# Patient Record
Sex: MALE | Race: WHITE | Employment: UNEMPLOYED | URBAN - METROPOLITAN AREA
[De-identification: names, ages, dates, MRNs, and addresses within clinical notes are randomized per-mention and may not be internally consistent; named-entity substitution may affect disease eponyms.]

---

## 2017-01-18 DIAGNOSIS — F41.1 GENERALIZED ANXIETY DISORDER: ICD-10-CM

## 2017-01-18 DIAGNOSIS — F98.8 ADD (ATTENTION DEFICIT DISORDER): ICD-10-CM

## 2017-01-20 RX ORDER — DEXTROAMPHETAMINE SACCHARATE, AMPHETAMINE ASPARTATE, DEXTROAMPHETAMINE SULFATE AND AMPHETAMINE SULFATE 5; 5; 5; 5 MG/1; MG/1; MG/1; MG/1
20 TABLET ORAL 2 TIMES DAILY
Qty: 60 TABLET | Refills: 0 | Status: SHIPPED | OUTPATIENT
Start: 2017-01-20 | End: 2017-08-09 | Stop reason: ALTCHOICE

## 2017-01-20 RX ORDER — ALPRAZOLAM 1 MG/1
1 TABLET ORAL 2 TIMES DAILY PRN
Qty: 60 TABLET | Refills: 0 | Status: SHIPPED | OUTPATIENT
Start: 2017-01-20 | End: 2017-08-09 | Stop reason: ALTCHOICE

## 2017-01-20 RX ORDER — ALBUTEROL SULFATE 90 UG/1
2 AEROSOL, METERED RESPIRATORY (INHALATION) EVERY 6 HOURS PRN
Qty: 3 INHALER | Refills: 1 | Status: SHIPPED | OUTPATIENT
Start: 2017-01-20 | End: 2017-10-09 | Stop reason: SDUPTHER

## 2017-01-23 ENCOUNTER — OFFICE VISIT (OUTPATIENT)
Dept: PSYCHIATRY | Age: 22
End: 2017-01-23

## 2017-01-23 VITALS
DIASTOLIC BLOOD PRESSURE: 60 MMHG | OXYGEN SATURATION: 95 % | WEIGHT: 129 LBS | SYSTOLIC BLOOD PRESSURE: 100 MMHG | HEART RATE: 117 BPM | BODY MASS INDEX: 19.11 KG/M2 | HEIGHT: 69 IN

## 2017-01-23 DIAGNOSIS — F41.9 ANXIETY: Primary | ICD-10-CM

## 2017-01-23 PROCEDURE — 99204 OFFICE O/P NEW MOD 45 MIN: CPT | Performed by: PSYCHIATRY & NEUROLOGY

## 2017-01-23 ASSESSMENT — ENCOUNTER SYMPTOMS
RESPIRATORY NEGATIVE: 1
GASTROINTESTINAL NEGATIVE: 1
EYES NEGATIVE: 1

## 2017-02-20 DIAGNOSIS — F98.8 ADD (ATTENTION DEFICIT DISORDER): ICD-10-CM

## 2017-02-20 DIAGNOSIS — F41.1 GENERALIZED ANXIETY DISORDER: ICD-10-CM

## 2017-02-20 RX ORDER — DEXTROAMPHETAMINE SACCHARATE, AMPHETAMINE ASPARTATE, DEXTROAMPHETAMINE SULFATE AND AMPHETAMINE SULFATE 5; 5; 5; 5 MG/1; MG/1; MG/1; MG/1
20 TABLET ORAL 2 TIMES DAILY
Qty: 60 TABLET | Refills: 0 | OUTPATIENT
Start: 2017-02-20

## 2017-02-20 RX ORDER — ALPRAZOLAM 1 MG/1
1 TABLET ORAL 2 TIMES DAILY PRN
Qty: 60 TABLET | Refills: 0 | OUTPATIENT
Start: 2017-02-20

## 2017-02-21 ENCOUNTER — OFFICE VISIT (OUTPATIENT)
Dept: FAMILY MEDICINE CLINIC | Age: 22
End: 2017-02-21

## 2017-02-21 VITALS
DIASTOLIC BLOOD PRESSURE: 72 MMHG | OXYGEN SATURATION: 98 % | BODY MASS INDEX: 20.71 KG/M2 | TEMPERATURE: 97.4 F | SYSTOLIC BLOOD PRESSURE: 118 MMHG | WEIGHT: 139.8 LBS | HEART RATE: 119 BPM | HEIGHT: 69 IN

## 2017-02-21 DIAGNOSIS — F41.1 GENERALIZED ANXIETY DISORDER: Primary | ICD-10-CM

## 2017-02-21 DIAGNOSIS — F98.8 ADD (ATTENTION DEFICIT DISORDER): ICD-10-CM

## 2017-02-21 DIAGNOSIS — F13.20 BENZODIAZEPINE DEPENDENCE (HCC): ICD-10-CM

## 2017-02-21 PROCEDURE — 99214 OFFICE O/P EST MOD 30 MIN: CPT | Performed by: FAMILY MEDICINE

## 2017-02-21 RX ORDER — HYDROXYZINE HYDROCHLORIDE 25 MG/1
25 TABLET, FILM COATED ORAL EVERY 4 HOURS PRN
Qty: 60 TABLET | Refills: 1 | Status: SHIPPED | OUTPATIENT
Start: 2017-02-21 | End: 2017-08-09 | Stop reason: ALTCHOICE

## 2017-02-22 ASSESSMENT — ENCOUNTER SYMPTOMS
RESPIRATORY NEGATIVE: 1
EYES NEGATIVE: 1
GASTROINTESTINAL NEGATIVE: 1

## 2017-02-26 LAB
6-ACETYLMORPHINE: NOT DETECTED
7-AMINOCLONAZEPAM: NOT DETECTED
ALPHA-OH-ALPRAZOLAM: NOT DETECTED
ALPRAZOLAM: NOT DETECTED
AMPHETAMINE: NOT DETECTED
BARBITURATES: NOT DETECTED
BENZOYLECGONINE: NOT DETECTED
BUPRENORPHINE: NOT DETECTED
CARISOPRODOL: NOT DETECTED
CLONAZEPAM: NOT DETECTED
CODEINE: NOT DETECTED
CREATININE URINE: 31.6 MG/DL (ref 20–400)
DIAZEPAM: NOT DETECTED
DRUGS EXPECTED: NORMAL
EER PAIN MGT DRUG PANEL, HIGH RES/EMIT U: NORMAL
ETHYL GLUCURONIDE: NOT DETECTED
FENTANYL: NOT DETECTED
HYDROCODONE: NOT DETECTED
HYDROMORPHONE: NOT DETECTED
LORAZEPAM: NOT DETECTED
MARIJUANA METABOLITE: PRESENT
MDA: NOT DETECTED
MDEA: NOT DETECTED
MDMA URINE: NOT DETECTED
MEPERIDINE: NOT DETECTED
METHADONE: NOT DETECTED
METHAMPHETAMINE: NOT DETECTED
METHYLPHENIDATE: NOT DETECTED
MIDAZOLAM: NOT DETECTED
MORPHINE: NOT DETECTED
NORBUPRENORPHINE, FREE: NOT DETECTED
NORDIAZEPAM: NOT DETECTED
NORFENTANYL: NOT DETECTED
NORHYDROCODONE, URINE: NOT DETECTED
NOROXYCODONE: NOT DETECTED
NOROXYMORPHONE, URINE: NOT DETECTED
OXAZEPAM: NOT DETECTED
OXYCODONE: NOT DETECTED
OXYMORPHONE: NOT DETECTED
PAIN MANAGEMENT DRUG PANEL: NORMAL
PAIN MANAGEMENT DRUG PANEL: NORMAL
PCP: NOT DETECTED
PHENTERMINE: NOT DETECTED
PROPOXYPHENE: NOT DETECTED
TAPENTADOL, URINE: NOT DETECTED
TAPENTADOL-O-SULFATE, URINE: NOT DETECTED
TEMAZEPAM: NOT DETECTED
TRAMADOL: NOT DETECTED
ZOLPIDEM: NOT DETECTED

## 2017-03-20 ENCOUNTER — OFFICE VISIT (OUTPATIENT)
Dept: FAMILY MEDICINE CLINIC | Age: 22
End: 2017-03-20

## 2017-03-20 VITALS
WEIGHT: 144 LBS | TEMPERATURE: 98.2 F | BODY MASS INDEX: 21.27 KG/M2 | SYSTOLIC BLOOD PRESSURE: 120 MMHG | DIASTOLIC BLOOD PRESSURE: 84 MMHG

## 2017-03-20 DIAGNOSIS — F13.20 BENZODIAZEPINE DEPENDENCE (HCC): ICD-10-CM

## 2017-03-20 DIAGNOSIS — F41.1 GENERALIZED ANXIETY DISORDER: ICD-10-CM

## 2017-03-20 DIAGNOSIS — F98.8 ADD (ATTENTION DEFICIT DISORDER): Primary | ICD-10-CM

## 2017-03-20 PROCEDURE — 99213 OFFICE O/P EST LOW 20 MIN: CPT | Performed by: FAMILY MEDICINE

## 2017-03-20 RX ORDER — CLONIDINE HYDROCHLORIDE 0.1 MG/1
0.1 TABLET ORAL NIGHTLY
Qty: 30 TABLET | Refills: 0 | Status: SHIPPED | OUTPATIENT
Start: 2017-03-20 | End: 2017-04-15 | Stop reason: SDUPTHER

## 2017-03-26 ASSESSMENT — ENCOUNTER SYMPTOMS
RESPIRATORY NEGATIVE: 1
GASTROINTESTINAL NEGATIVE: 1
EYES NEGATIVE: 1

## 2017-04-15 DIAGNOSIS — F98.8 ADD (ATTENTION DEFICIT DISORDER): ICD-10-CM

## 2017-04-15 DIAGNOSIS — F41.1 GENERALIZED ANXIETY DISORDER: ICD-10-CM

## 2017-04-15 RX ORDER — CLONIDINE HYDROCHLORIDE 0.1 MG/1
0.1 TABLET ORAL NIGHTLY
Qty: 30 TABLET | Refills: 0 | Status: SHIPPED | OUTPATIENT
Start: 2017-04-15 | End: 2017-08-09 | Stop reason: ALTCHOICE

## 2017-06-04 ENCOUNTER — TELEPHONE (OUTPATIENT)
Dept: FAMILY MEDICINE CLINIC | Age: 22
End: 2017-06-04

## 2017-08-09 ENCOUNTER — OFFICE VISIT (OUTPATIENT)
Dept: FAMILY MEDICINE CLINIC | Age: 22
End: 2017-08-09

## 2017-08-09 VITALS
WEIGHT: 140.8 LBS | SYSTOLIC BLOOD PRESSURE: 102 MMHG | HEIGHT: 69 IN | BODY MASS INDEX: 20.86 KG/M2 | OXYGEN SATURATION: 100 % | TEMPERATURE: 98.6 F | HEART RATE: 123 BPM | DIASTOLIC BLOOD PRESSURE: 78 MMHG

## 2017-08-09 DIAGNOSIS — R30.0 DYSURIA: Primary | ICD-10-CM

## 2017-08-09 DIAGNOSIS — R30.0 DYSURIA: ICD-10-CM

## 2017-08-09 LAB
ALBUMIN SERPL-MCNC: 4.9 G/DL (ref 3.4–5)
ANION GAP SERPL CALCULATED.3IONS-SCNC: 16 MMOL/L (ref 3–16)
BASOPHILS ABSOLUTE: 0.1 K/UL (ref 0–0.2)
BASOPHILS RELATIVE PERCENT: 1 %
BILIRUBIN, POC: NORMAL
BLOOD URINE, POC: NORMAL
BUN BLDV-MCNC: 11 MG/DL (ref 7–20)
CALCIUM SERPL-MCNC: 10 MG/DL (ref 8.3–10.6)
CHLORIDE BLD-SCNC: 99 MMOL/L (ref 99–110)
CLARITY, POC: CLEAR
CO2: 26 MMOL/L (ref 21–32)
COLOR, POC: YELLOW
CREAT SERPL-MCNC: 0.8 MG/DL (ref 0.9–1.3)
EOSINOPHILS ABSOLUTE: 0.6 K/UL (ref 0–0.6)
EOSINOPHILS RELATIVE PERCENT: 12 %
GFR AFRICAN AMERICAN: >60
GFR NON-AFRICAN AMERICAN: >60
GLUCOSE BLD-MCNC: 86 MG/DL (ref 70–99)
GLUCOSE URINE, POC: NORMAL
HCT VFR BLD CALC: 45 % (ref 40.5–52.5)
HEMOGLOBIN: 15.7 G/DL (ref 13.5–17.5)
KETONES, POC: NORMAL
LEUKOCYTE EST, POC: NORMAL
LYMPHOCYTES ABSOLUTE: 2.1 K/UL (ref 1–5.1)
LYMPHOCYTES RELATIVE PERCENT: 38.8 %
MCH RBC QN AUTO: 30.6 PG (ref 26–34)
MCHC RBC AUTO-ENTMCNC: 34.9 G/DL (ref 31–36)
MCV RBC AUTO: 87.5 FL (ref 80–100)
MONOCYTES ABSOLUTE: 0.5 K/UL (ref 0–1.3)
MONOCYTES RELATIVE PERCENT: 9.6 %
NEUTROPHILS ABSOLUTE: 2.1 K/UL (ref 1.7–7.7)
NEUTROPHILS RELATIVE PERCENT: 38.6 %
NITRITE, POC: NORMAL
PDW BLD-RTO: 12.8 % (ref 12.4–15.4)
PH, POC: 8.5
PHOSPHORUS: 3.5 MG/DL (ref 2.5–4.9)
PLATELET # BLD: 221 K/UL (ref 135–450)
PMV BLD AUTO: 9 FL (ref 5–10.5)
POTASSIUM SERPL-SCNC: 4.3 MMOL/L (ref 3.5–5.1)
PROTEIN, POC: NORMAL
RBC # BLD: 5.14 M/UL (ref 4.2–5.9)
SODIUM BLD-SCNC: 141 MMOL/L (ref 136–145)
SPECIFIC GRAVITY, POC: 1
UROBILINOGEN, POC: 0.2
WBC # BLD: 5.3 K/UL (ref 4–11)

## 2017-08-09 PROCEDURE — 99213 OFFICE O/P EST LOW 20 MIN: CPT | Performed by: NURSE PRACTITIONER

## 2017-08-09 PROCEDURE — 81002 URINALYSIS NONAUTO W/O SCOPE: CPT | Performed by: NURSE PRACTITIONER

## 2017-08-09 RX ORDER — PHENAZOPYRIDINE HYDROCHLORIDE 100 MG/1
100 TABLET, FILM COATED ORAL 3 TIMES DAILY PRN
Qty: 21 TABLET | Refills: 0 | Status: SHIPPED | OUTPATIENT
Start: 2017-08-09 | End: 2017-08-23 | Stop reason: ALTCHOICE

## 2017-08-09 ASSESSMENT — ENCOUNTER SYMPTOMS
GASTROINTESTINAL NEGATIVE: 1
RESPIRATORY NEGATIVE: 1
ALLERGIC/IMMUNOLOGIC NEGATIVE: 1
NAUSEA: 0
VOMITING: 0
EYES NEGATIVE: 1

## 2017-08-11 ENCOUNTER — OFFICE VISIT (OUTPATIENT)
Dept: FAMILY MEDICINE CLINIC | Age: 22
End: 2017-08-11

## 2017-08-11 VITALS
TEMPERATURE: 98.6 F | HEART RATE: 110 BPM | WEIGHT: 140.6 LBS | SYSTOLIC BLOOD PRESSURE: 100 MMHG | OXYGEN SATURATION: 98 % | DIASTOLIC BLOOD PRESSURE: 62 MMHG | BODY MASS INDEX: 20.83 KG/M2 | HEIGHT: 69 IN

## 2017-08-11 DIAGNOSIS — R30.0 DYSURIA: ICD-10-CM

## 2017-08-11 DIAGNOSIS — J45.20 MILD INTERMITTENT ASTHMA WITHOUT COMPLICATION: Primary | ICD-10-CM

## 2017-08-11 LAB — URINE CULTURE, ROUTINE: NORMAL

## 2017-08-11 PROCEDURE — 99213 OFFICE O/P EST LOW 20 MIN: CPT | Performed by: NURSE PRACTITIONER

## 2017-08-11 ASSESSMENT — ENCOUNTER SYMPTOMS
DIFFICULTY BREATHING: 0
EYES NEGATIVE: 1
WHEEZING: 0
FREQUENT THROAT CLEARING: 0
TROUBLE SWALLOWING: 0
HOARSE VOICE: 0
HEARTBURN: 0
RHINORRHEA: 0
COUGH: 1
ALLERGIC/IMMUNOLOGIC NEGATIVE: 1
HEMOPTYSIS: 0
SPUTUM PRODUCTION: 0
NAUSEA: 0
SORE THROAT: 0
VOMITING: 0
GASTROINTESTINAL NEGATIVE: 1
SHORTNESS OF BREATH: 0
CHEST TIGHTNESS: 0

## 2017-08-23 ENCOUNTER — OFFICE VISIT (OUTPATIENT)
Dept: FAMILY MEDICINE CLINIC | Age: 22
End: 2017-08-23

## 2017-08-23 VITALS
WEIGHT: 138.4 LBS | SYSTOLIC BLOOD PRESSURE: 104 MMHG | TEMPERATURE: 98.6 F | DIASTOLIC BLOOD PRESSURE: 68 MMHG | BODY MASS INDEX: 20.44 KG/M2

## 2017-08-23 DIAGNOSIS — N34.2 URETHRITIS: Primary | ICD-10-CM

## 2017-08-23 LAB
BILIRUBIN, POC: NORMAL
BLOOD URINE, POC: NORMAL
CLARITY, POC: CLEAR
COLOR, POC: YELLOW
GLUCOSE URINE, POC: NORMAL
KETONES, POC: NORMAL
LEUKOCYTE EST, POC: NORMAL
NITRITE, POC: NORMAL
PH, POC: 7
PROTEIN, POC: NORMAL
SPECIFIC GRAVITY, POC: 1.01
UROBILINOGEN, POC: 0.2

## 2017-08-23 PROCEDURE — 99213 OFFICE O/P EST LOW 20 MIN: CPT | Performed by: FAMILY MEDICINE

## 2017-08-23 PROCEDURE — 81002 URINALYSIS NONAUTO W/O SCOPE: CPT | Performed by: FAMILY MEDICINE

## 2017-08-23 ASSESSMENT — ENCOUNTER SYMPTOMS
GASTROINTESTINAL NEGATIVE: 1
RESPIRATORY NEGATIVE: 1
EYES NEGATIVE: 1

## 2017-08-24 LAB
C. TRACHOMATIS DNA ,URINE: NEGATIVE
N. GONORRHOEAE DNA, URINE: NEGATIVE

## 2017-10-06 ENCOUNTER — TELEPHONE (OUTPATIENT)
Dept: FAMILY MEDICINE CLINIC | Age: 22
End: 2017-10-06

## 2017-10-06 NOTE — TELEPHONE ENCOUNTER
Hi   I'm messaging you for my son Satya Rose. He was in the ER 9/15 after being on an antibiotic for 3 weeks given to him by a urologist. His   Stomach was in severe distress. He takes Nexium and tums everyday. He was given sucralfate 1 gram to take 4 times a day. It helps a little but he is not well. Can you refill this? Also I called the [facility]. He has an   Appointment in October 17. Do you know anyone we. Oils get into see sooner? He's losing weight and his stomach burns all the time. Our appointment on the 17th is with Magnus Garcia. Do you know him? Although his bloodwork comes out ok there is something distressing his stomach. He knows I'm emailing. You can email him back or me. Whatever is fine. Thank you!!  RADHA

## 2017-10-09 ENCOUNTER — OFFICE VISIT (OUTPATIENT)
Dept: FAMILY MEDICINE CLINIC | Age: 22
End: 2017-10-09

## 2017-10-09 VITALS
TEMPERATURE: 97.5 F | DIASTOLIC BLOOD PRESSURE: 82 MMHG | SYSTOLIC BLOOD PRESSURE: 116 MMHG | WEIGHT: 127.6 LBS | HEIGHT: 68 IN | BODY MASS INDEX: 19.34 KG/M2

## 2017-10-09 DIAGNOSIS — G89.29 CHRONIC GENERALIZED ABDOMINAL PAIN: Primary | ICD-10-CM

## 2017-10-09 DIAGNOSIS — K21.9 GASTROESOPHAGEAL REFLUX DISEASE, ESOPHAGITIS PRESENCE NOT SPECIFIED: ICD-10-CM

## 2017-10-09 DIAGNOSIS — J45.30 REACTIVE AIRWAY DISEASE, MILD PERSISTENT, UNCOMPLICATED: ICD-10-CM

## 2017-10-09 DIAGNOSIS — R10.84 CHRONIC GENERALIZED ABDOMINAL PAIN: Primary | ICD-10-CM

## 2017-10-09 LAB
BILIRUBIN, POC: ABNORMAL
BLOOD URINE, POC: ABNORMAL
CLARITY, POC: ABNORMAL
COLOR, POC: YELLOW
GLUCOSE URINE, POC: ABNORMAL
KETONES, POC: 80
LEUKOCYTE EST, POC: ABNORMAL
NITRITE, POC: ABNORMAL
PH, POC: 6.5
PROTEIN, POC: 100
SPECIFIC GRAVITY, POC: 1.01
UROBILINOGEN, POC: 0.2

## 2017-10-09 PROCEDURE — 99213 OFFICE O/P EST LOW 20 MIN: CPT | Performed by: FAMILY MEDICINE

## 2017-10-09 PROCEDURE — 81002 URINALYSIS NONAUTO W/O SCOPE: CPT | Performed by: FAMILY MEDICINE

## 2017-10-09 RX ORDER — ALBUTEROL SULFATE 90 UG/1
2 AEROSOL, METERED RESPIRATORY (INHALATION) EVERY 6 HOURS PRN
Qty: 3 INHALER | Refills: 1 | Status: SHIPPED | OUTPATIENT
Start: 2017-10-09 | End: 2018-03-16 | Stop reason: SDUPTHER

## 2017-10-09 RX ORDER — DEXLANSOPRAZOLE 60 MG/1
60 CAPSULE, DELAYED RELEASE ORAL DAILY
Qty: 30 CAPSULE | Refills: 3 | Status: SHIPPED | OUTPATIENT
Start: 2017-10-09 | End: 2021-11-30 | Stop reason: ALTCHOICE

## 2017-10-11 ENCOUNTER — PATIENT MESSAGE (OUTPATIENT)
Dept: FAMILY MEDICINE CLINIC | Age: 22
End: 2017-10-11

## 2017-10-11 LAB — TISSUE TRANSGLUTAMINASE IGA: 1 U/ML (ref 0–3)

## 2017-10-11 ASSESSMENT — ENCOUNTER SYMPTOMS
EYES NEGATIVE: 1
RESPIRATORY NEGATIVE: 1
GASTROINTESTINAL NEGATIVE: 1

## 2017-10-11 NOTE — PROGRESS NOTES
10/11/17    Philip Early  1995      Chief Complaint   Patient presents with    Abdominal Pain     burning, abd pain, constant, nausea, started a month ago   Abdominal Pain: Patient complains of abdominal pain. The pain is described as aching, burning and cramping, and is several/10 in intensity. Pain is located in the epigastric, periumbilical bilateral without radiation. Onset was several weeks ago. Symptoms have been unchanged since. Aggravating factors: eating. Alleviating factors: acetaminophen, activity, bowel movements and proton pump inhibitors. Associated symptoms: anorexia, nausea and sweats. The patient denies constipation and diarrhea. Needs a refill on the albuterol inhlaer.  He is using it nightly    Vitals:    10/09/17 1520   BP: 116/82   Temp: 97.5 °F (36.4 °C)         Immunization History   Administered Date(s) Administered    Meningococcal MCV4P (Menactra) 05/03/2010    Tdap (Boostrix, Adacel) 08/03/2010    Varicella (Varivax) 03/11/2003, 05/03/2010       Allergies   Allergen Reactions    Amoxicillin Hives    Ceclor [Cefaclor]      Joints swelled     Gluten Meal Nausea And Vomiting     fatigue     Outpatient Prescriptions Marked as Taking for the 10/9/17 encounter (Office Visit) with Marek Cosby MD   Medication Sig Dispense Refill    dexlansoprazole (DEXILANT) 60 MG CPDR delayed release capsule Take 1 capsule by mouth daily 30 capsule 3    albuterol sulfate HFA (PROAIR HFA) 108 (90 Base) MCG/ACT inhaler Inhale 2 puffs into the lungs every 6 hours as needed for Wheezing or Shortness of Breath 3 Inhaler 1    sucralfate (CARAFATE) 1 GM tablet Take 1 tablet by mouth 4 times daily 120 tablet 3    Chlorphen-Phenyleph-APAP (TYLENOL ALLERGY MULTI-SYMPTOM) 2-5-325 MG TABS Take 1 tablet by mouth 4 times daily as needed (congestion) 80 tablet 1    esomeprazole Magnesium (NEXIUM) 20 MG PACK Take 20 mg by mouth daily         Past Medical History:   Diagnosis Date    ADD

## 2017-11-13 NOTE — TELEPHONE ENCOUNTER
Pt is currently being treated on dexilant and needs a PA request.   Sent over PA by [facility] for pt.   Bao Koroma ([facility]) [address]

## 2017-11-15 ENCOUNTER — HOSPITAL ENCOUNTER (OUTPATIENT)
Dept: NUCLEAR MEDICINE | Age: 22
Discharge: HOME | End: 2017-11-15
Attending: INTERNAL MEDICINE | Admitting: INTERNAL MEDICINE

## 2017-11-15 VITALS — WEIGHT: 130 LBS | BODY MASS INDEX: 19.7 KG/M2 | HEIGHT: 68 IN

## 2017-11-15 DIAGNOSIS — R10.9 ABDOMINAL PAIN, UNSPECIFIED ABDOMINAL LOCATION: ICD-10-CM

## 2017-11-15 DIAGNOSIS — R10.9 ABDOMINAL PAIN: ICD-10-CM

## 2017-11-15 RX ADMIN — Medication 6 MILLICURIE: at 08:11

## 2017-11-28 ENCOUNTER — TELEPHONE (OUTPATIENT)
Dept: FAMILY MEDICINE CLINIC | Age: 22
End: 2017-11-28

## 2017-11-28 NOTE — TELEPHONE ENCOUNTER
Please call him and ask him how often he is using the rescue inhaler. If it is more than four times a week he needs to be seen ot address this as that is too much.

## 2017-12-01 ENCOUNTER — OFFICE VISIT (OUTPATIENT)
Dept: SURGERY | Age: 22
End: 2017-12-01

## 2017-12-01 VITALS
SYSTOLIC BLOOD PRESSURE: 112 MMHG | OXYGEN SATURATION: 96 % | TEMPERATURE: 97.1 F | HEART RATE: 79 BPM | DIASTOLIC BLOOD PRESSURE: 71 MMHG | BODY MASS INDEX: 20.46 KG/M2 | WEIGHT: 135 LBS | HEIGHT: 68 IN

## 2017-12-01 DIAGNOSIS — R10.10 INTERMITTENT UPPER ABDOMINAL PAIN: ICD-10-CM

## 2017-12-01 DIAGNOSIS — K82.8 BILIARY DYSKINESIA: Primary | ICD-10-CM

## 2017-12-01 PROCEDURE — G8484 FLU IMMUNIZE NO ADMIN: HCPCS | Performed by: SURGERY

## 2017-12-01 PROCEDURE — G8420 CALC BMI NORM PARAMETERS: HCPCS | Performed by: SURGERY

## 2017-12-01 PROCEDURE — G8427 DOCREV CUR MEDS BY ELIG CLIN: HCPCS | Performed by: SURGERY

## 2017-12-01 PROCEDURE — 99244 OFF/OP CNSLTJ NEW/EST MOD 40: CPT | Performed by: SURGERY

## 2017-12-01 NOTE — LETTER
[facility]) Surgical Oncology and General Surgery   [address]    p [phone] f [phone] MD Yolanda    SURGERY ORDER --   2017    3:30pm     Facility:   [address] [place] [address]. # _________________                                  Scheduled by: ____________    Surgery Date & Time:    2017    11:00am                                                             Pt arrival:   9:00am     Patient Name:  Linda Hidalgo           :  1995          PCP:  Diego Quigley MD       Home Ph:    [phone] (home) [phone] (work)                                                    PROCEDURE: Laparoscopic cholecystectomy with intraoperative cholangiogram    DIAGNOSIS:      ICD-10-CM ICD-9-CM    1. Biliary dyskinesia K82.8 575.8    2.  Intermittent upper abdominal pain R10.10 789.09      Anesthesia: _GA_ Time Needed: _90 mins_Pt Position: _supine_         Outpatient _Y_ Admit __  Assist._____  Pre-Op H & P to be done by: _PCP__      Labs Needed: CBC__PT/PTT__INR__CMP__EKG__CXR__Urine Hcg ___    Additional / Special Orders:   Ciprofloxacin 400 mg IV constantin Cervantes MD  12/3/2017 9:14 PM

## 2017-12-04 NOTE — PROGRESS NOTES
[facility]) Surgical Oncology / General Surgery    [facility] 1995    HPI: Dear Dr. Aurea Guevara, Thank you for referring . Lindsay Forde for management of gallbladder problems. Patient is having intermittent upper abdominal pain for a few years. Patient tried gluten free diet initially. He had endoscopy done which showed some erythema of stomach. Upon further investigation HIDA scan showed decreased ejection fraction of gallbladder. Patient is unable to say consistent aggravating or relieving factors. He does states that episodes of pain is getting more frequent. Pain does comes sometimes after eating heavy meals. New medication started by Dr. Kemar Hills does seems to help some. No history of jaundice, pancreatitis or any other liver issues. Past Medical History:   Diagnosis Date    ADD (attention deficit disorder) 11/17/2014    Allergic rhinitis 9/4/2013    Allergy     Asthma 9/4/2013    Gluten-induced enteropathy 12/17/2015       History reviewed. No pertinent surgical history.     Allergies: Amoxicillin; Ceclor [cefaclor]; and Gluten meal     Social History:   Social History   Substance Use Topics    Smoking status: Current Every Day Smoker     Packs/day: 0.25     Years: 6.00     Types: E-Cigarettes    Smokeless tobacco: Current User      Comment: patient smokes e-cigarettes started 3 weeks ago 7/28/2017    Alcohol use 1.2 oz/week     2 Shots of liquor per week        Current Outpatient Prescriptions   Medication Sig Dispense Refill    dexlansoprazole (DEXILANT) 60 MG CPDR delayed release capsule Take 1 capsule by mouth daily 30 capsule 3    albuterol sulfate HFA (PROAIR HFA) 108 (90 Base) MCG/ACT inhaler Inhale 2 puffs into the lungs every 6 hours as needed for Wheezing or Shortness of Breath 3 Inhaler 1    sucralfate (CARAFATE) 1 GM tablet Take 1 tablet by mouth 4 times daily 120 tablet 3    Chlorphen-Phenyleph-APAP (TYLENOL ALLERGY MULTI-SYMPTOM) 2-5-325 MG TABS Take 1 tablet by mouth 4 times daily as needed (congestion) 80 tablet 1    esomeprazole Magnesium (NEXIUM) 20 MG PACK Take 20 mg by mouth daily       No current facility-administered medications for this visit. Review of Systems:  Review of all other systems was negative except as noted in HPI. /71   Pulse 79   Temp 97.1 °F (36.2 °C) (Oral)   Ht 5' 8\" (1.727 m)   Wt 135 lb (61.2 kg)   SpO2 96%   BMI 20.53 kg/m²         Physical Exam:   Constitutional: He is oriented to person, place, and time. He appears well-developed and well-nourished. No distress. HENT: Moist mucus membranes  Head: Normocephalic and atraumatic. Eyes: EOM are normal. Pupils are equal, round, and no icterus. Neck: Normal range of motion. Neck supple. No thyromegaly present. Cardiovascular: Normal rate and regular rhythm by peripheral pulses. Pulmonary/Chest: No respiratory distress. Abdominal: Soft. He exhibits no distension and no mass. There is no hepatosplenomegaly. No tenderness. Has no CVA tenderness. Extremities: He exhibits no edema. Lymphadenopathy: He has no right or left cervical adenopathy. Neurological: Grossly intact. Skin: Skin is warm and dry. Psychiatric:Normal mood and affect. Her behavior is normal. Judgment and thought content normal.        Assessment:     1. Biliary dyskinesia     2. Intermittent upper abdominal pain       Plan:      Discussed extensively with patient and family about differential diagnosis for patient's symptoms. Patient do not have a very typical gallbladder symptoms but also does not appears to have any other clear etiology after extensive workup. HIDA scan showing decreased ejection fraction of the gallbladder indicating possible gallbladder pathology as etiology of his symptoms. Explained about possibility that some of patient's symptoms may not resolve completely after surgery. I discussed the nature of gallbladder disease with the patient.  I discussed the operation of

## 2017-12-05 RX ORDER — TRAMADOL HYDROCHLORIDE 50 MG/1
TABLET ORAL
Refills: 0 | COMMUNITY
Start: 2017-11-21 | End: 2021-11-30 | Stop reason: ALTCHOICE

## 2017-12-19 ENCOUNTER — OFFICE VISIT (OUTPATIENT)
Dept: FAMILY MEDICINE CLINIC | Age: 22
End: 2017-12-19

## 2017-12-19 VITALS
BODY MASS INDEX: 20.19 KG/M2 | TEMPERATURE: 97.5 F | HEIGHT: 68 IN | OXYGEN SATURATION: 96 % | HEART RATE: 106 BPM | WEIGHT: 133.2 LBS | DIASTOLIC BLOOD PRESSURE: 82 MMHG | SYSTOLIC BLOOD PRESSURE: 108 MMHG

## 2017-12-19 DIAGNOSIS — Z01.818 PREOP GENERAL PHYSICAL EXAM: Primary | ICD-10-CM

## 2017-12-19 DIAGNOSIS — J45.20 MILD INTERMITTENT ASTHMA WITHOUT COMPLICATION: ICD-10-CM

## 2017-12-19 DIAGNOSIS — K82.8 BILIARY DYSKINESIA: ICD-10-CM

## 2017-12-19 DIAGNOSIS — J30.89 CHRONIC NONSEASONAL ALLERGIC RHINITIS DUE TO POLLEN: ICD-10-CM

## 2017-12-19 DIAGNOSIS — N31.9 BLADDER DYSFUNCTION: ICD-10-CM

## 2017-12-19 PROCEDURE — 99215 OFFICE O/P EST HI 40 MIN: CPT | Performed by: FAMILY MEDICINE

## 2017-12-19 PROCEDURE — G8420 CALC BMI NORM PARAMETERS: HCPCS | Performed by: FAMILY MEDICINE

## 2017-12-19 PROCEDURE — G8427 DOCREV CUR MEDS BY ELIG CLIN: HCPCS | Performed by: FAMILY MEDICINE

## 2017-12-19 PROCEDURE — G8484 FLU IMMUNIZE NO ADMIN: HCPCS | Performed by: FAMILY MEDICINE

## 2017-12-19 RX ORDER — DICYCLOMINE HCL 20 MG
TABLET ORAL
Refills: 0 | COMMUNITY
Start: 2017-12-08 | End: 2021-11-30 | Stop reason: ALTCHOICE

## 2017-12-19 RX ORDER — METHENAMINE, SODIUM PHOSPHATE, MONOBASIC, MONOHYDRATE, PHENYL SALICYLATE, METHYLENE BLUE, AND HYOSCYAMINE SULFATE 120; 40.8; 36; 10; .12 MG/1; MG/1; MG/1; MG/1; MG/1
CAPSULE ORAL
Refills: 2 | COMMUNITY
Start: 2017-12-13 | End: 2017-12-21 | Stop reason: SINTOL

## 2017-12-19 RX ORDER — RANITIDINE 150 MG/1
CAPSULE ORAL
Refills: 5 | COMMUNITY
Start: 2017-12-02 | End: 2021-11-30 | Stop reason: ALTCHOICE

## 2017-12-19 NOTE — PROGRESS NOTES
status: Single     Spouse name: N/A    Number of children: N/A    Years of education: N/A     Social History Main Topics    Smoking status: Current Every Day Smoker     Packs/day: 0.25     Years: 6.00     Types: E-Cigarettes    Smokeless tobacco: Current User      Comment: patient smokes e-cigarettes started 3 weeks ago 7/28/2017    Alcohol use 1.2 oz/week     2 Shots of liquor per week    Drug use: No    Sexual activity: Not Asked     Other Topics Concern    None     Social History Narrative    None     Current Outpatient Prescriptions   Medication Sig Dispense Refill    dicyclomine (BENTYL) 20 MG tablet 1 (ONE) TABLET EVERY SIX HOURS AS NEEDED  0    Meth-Hyo-M Bl-Na Phos-Ph Sal (URIBEL) 118 MG CAPS 1 CAPSULE EVERY 6 HOURS AS NEEDED  2    ranitidine (ZANTAC) 150 MG capsule TAKE ONE CAPSULE BY MOUTH AT BEDTIME  5    traMADol (ULTRAM) 50 MG tablet TAKE 1 TABLET BY MOUTH EVERY 6 HOURS AS NEEDED FOR PAIN  0    dexlansoprazole (DEXILANT) 60 MG CPDR delayed release capsule Take 1 capsule by mouth daily 30 capsule 3    albuterol sulfate HFA (PROAIR HFA) 108 (90 Base) MCG/ACT inhaler Inhale 2 puffs into the lungs every 6 hours as needed for Wheezing or Shortness of Breath 3 Inhaler 1    sucralfate (CARAFATE) 1 GM tablet Take 1 tablet by mouth 4 times daily 120 tablet 3    Chlorphen-Phenyleph-APAP (TYLENOL ALLERGY MULTI-SYMPTOM) 2-5-325 MG TABS Take 1 tablet by mouth 4 times daily as needed (congestion) 80 tablet 1    esomeprazole Magnesium (NEXIUM) 20 MG PACK Take 20 mg by mouth daily       No current facility-administered medications for this visit. Allergies   Allergen Reactions    Amoxicillin Hives    Ceclor [Cefaclor]      Joints swelled     Gluten Meal Nausea And Vomiting     fatigue     Review of Systems  A comprehensive review of systems was negative.        Objective:      Physical Exam  /82   Pulse 106   Temp 97.5 °F (36.4 °C) (Temporal)   Ht 5' 8\" (1.727 m)   Wt 133 lb 3.2 oz anesthesiologist      1. Preop general physical exam  Okay for surgery     2. Biliary dyskinesia   The condition has deteriorated. It has failed conservative management and needs definitive surgical repair. I recommended that the patient wash entire body with Hibiclens soap daily starting three days before surgery. 3. Chronic nonseasonal allergic rhinitis due to pollen Condition stable continue the medications, treatments, will check labs as appropriate     4. Mild intermittent asthma without complication Condition stable continue the medications, treatments, will check labs as appropriate     5.  Bladder dysfunction Condition stable continue the medications, treatments, will check labs as appropriate  Will be okay to have cystoscopy

## 2017-12-20 ENCOUNTER — TELEPHONE (OUTPATIENT)
Dept: SURGERY | Age: 22
End: 2017-12-20

## 2017-12-20 NOTE — TELEPHONE ENCOUNTER
Patient is scheduled to have a scope done tomorrow 10 am w/ Dr Claudio Garg. Patient is asking if this will have any bearing on the procedure he has scheduled with Dr Jaime Gutierrez on Friday 12/22.     Padmini Shipman [phone]

## 2017-12-21 RX ORDER — SODIUM CHLORIDE, SODIUM LACTATE, POTASSIUM CHLORIDE, CALCIUM CHLORIDE 600; 310; 30; 20 MG/100ML; MG/100ML; MG/100ML; MG/100ML
INJECTION, SOLUTION INTRAVENOUS CONTINUOUS
Status: DISCONTINUED | OUTPATIENT
Start: 2017-12-21 | End: 2017-12-23 | Stop reason: HOSPADM

## 2017-12-21 RX ORDER — CHLORHEXIDINE GLUCONATE 0.12 MG/ML
15 RINSE ORAL 2 TIMES DAILY
Status: CANCELLED | OUTPATIENT
Start: 2017-12-21 | End: 2017-12-22

## 2017-12-21 RX ORDER — PHENAZOPYRIDINE HYDROCHLORIDE 200 MG/1
200 TABLET, FILM COATED ORAL 3 TIMES DAILY PRN
COMMUNITY
End: 2017-12-29 | Stop reason: ALTCHOICE

## 2017-12-21 RX ORDER — CHLORHEXIDINE GLUCONATE 0.12 MG/ML
15 RINSE ORAL 2 TIMES DAILY
Status: CANCELLED | OUTPATIENT
Start: 2017-12-21

## 2017-12-21 ASSESSMENT — PAIN - FUNCTIONAL ASSESSMENT: PAIN_FUNCTIONAL_ASSESSMENT: 0-10

## 2017-12-21 ASSESSMENT — PAIN DESCRIPTION - FREQUENCY: FREQUENCY: CONTINUOUS

## 2017-12-21 ASSESSMENT — PAIN DESCRIPTION - PAIN TYPE: TYPE: OTHER (COMMENT)

## 2017-12-21 ASSESSMENT — PAIN SCALES - GENERAL: PAINLEVEL_OUTOF10: 3

## 2017-12-21 ASSESSMENT — PAIN DESCRIPTION - LOCATION: LOCATION: ABDOMEN

## 2017-12-21 ASSESSMENT — PAIN DESCRIPTION - DESCRIPTORS: DESCRIPTORS: ACHING;CONSTANT

## 2017-12-21 NOTE — PRE-PROCEDURE INSTRUCTIONS
and after caring for your wound. Do not let your family touch your surgery site without cleaning their hands. 4. Mild nausea, headache, muscle aches, sore throat, or fatigue may occur after anesthesia. Should any of these symptoms become severe, or should you notice any signs of infection, you should call your surgeon. 5. Narcotic pain medications can cause significant constipation. You may want to add a stool softener to your postoperative medication schedule or speak to your surgeon on how best to manage this SIDE EFFECT. SPECIAL INSTRUCTIONS     Thank you for allowing us to care for you. We strive to exceed your expectations in the delivery of care and service provided to you and your family. If you need to contact us for any reason, please call us at [phone]    Instructions reviewed with patient during preadmission testing phone interview. Berry Jeffries. 12/21/2017 .1:50 PM      ADDITIONAL EDUCATIONAL INFORMATION REVIEWED PER PHONE WITH YOU AND/OR YOUR FAMILY:  No Bring a urine sample on day of surgery  Yes Pain Goal-Taking Control of Your Pain  Yes FAQs about Surgical Site Infections  Yes Hibiclens® Bathing Instructions / or Other Antibacterial Soap  Yes Incentive Spirometer Education  No Other

## 2017-12-22 ENCOUNTER — HOSPITAL ENCOUNTER (OUTPATIENT)
Dept: PREADMISSION TESTING | Age: 22
Discharge: HOME | End: 2017-12-22
Admitting: SURGERY

## 2017-12-22 VITALS
BODY MASS INDEX: 19.7 KG/M2 | WEIGHT: 130 LBS | SYSTOLIC BLOOD PRESSURE: 110 MMHG | TEMPERATURE: 96.9 F | OXYGEN SATURATION: 97 % | HEART RATE: 94 BPM | RESPIRATION RATE: 10 BRPM | HEIGHT: 68 IN | DIASTOLIC BLOOD PRESSURE: 63 MMHG

## 2017-12-22 DIAGNOSIS — R52 ACUTE PAIN: ICD-10-CM

## 2017-12-22 PROCEDURE — 47563 LAPARO CHOLECYSTECTOMY/GRAPH: CPT | Performed by: SURGERY

## 2017-12-22 RX ORDER — PROMETHAZINE HYDROCHLORIDE 25 MG/ML
6.25 INJECTION, SOLUTION INTRAMUSCULAR; INTRAVENOUS
Status: DISCONTINUED | OUTPATIENT
Start: 2017-12-22 | End: 2017-12-23 | Stop reason: HOSPADM

## 2017-12-22 RX ORDER — OXYCODONE HYDROCHLORIDE AND ACETAMINOPHEN 5; 325 MG/1; MG/1
1 TABLET ORAL EVERY 6 HOURS PRN
Qty: 15 TABLET | Refills: 0 | Status: SHIPPED | OUTPATIENT
Start: 2017-12-22 | End: 2017-12-29 | Stop reason: ALTCHOICE

## 2017-12-22 RX ORDER — OXYCODONE HYDROCHLORIDE AND ACETAMINOPHEN 5; 325 MG/1; MG/1
1 TABLET ORAL EVERY 4 HOURS PRN
Status: DISCONTINUED | OUTPATIENT
Start: 2017-12-22 | End: 2017-12-23 | Stop reason: HOSPADM

## 2017-12-22 RX ORDER — ONDANSETRON 2 MG/ML
4 INJECTION INTRAMUSCULAR; INTRAVENOUS EVERY 6 HOURS PRN
Status: DISCONTINUED | OUTPATIENT
Start: 2017-12-22 | End: 2017-12-23 | Stop reason: HOSPADM

## 2017-12-22 RX ORDER — GLYCOPYRROLATE 0.2 MG/ML
0.1 INJECTION INTRAMUSCULAR; INTRAVENOUS ONCE
Status: COMPLETED | OUTPATIENT
Start: 2017-12-22 | End: 2017-12-22

## 2017-12-22 RX ORDER — ONDANSETRON 2 MG/ML
INJECTION INTRAMUSCULAR; INTRAVENOUS
Status: DISCONTINUED
Start: 2017-12-22 | End: 2017-12-23 | Stop reason: HOSPADM

## 2017-12-22 RX ORDER — SCOLOPAMINE TRANSDERMAL SYSTEM 1 MG/1
1 PATCH, EXTENDED RELEASE TRANSDERMAL
Status: DISCONTINUED | OUTPATIENT
Start: 2017-12-22 | End: 2017-12-23 | Stop reason: HOSPADM

## 2017-12-22 RX ORDER — PROMETHAZINE HYDROCHLORIDE 25 MG/ML
INJECTION, SOLUTION INTRAMUSCULAR; INTRAVENOUS
Status: DISCONTINUED
Start: 2017-12-22 | End: 2017-12-23 | Stop reason: HOSPADM

## 2017-12-22 RX ORDER — CIPROFLOXACIN 2 MG/ML
400 INJECTION, SOLUTION INTRAVENOUS ONCE
Status: DISCONTINUED | OUTPATIENT
Start: 2017-12-22 | End: 2017-12-23 | Stop reason: HOSPADM

## 2017-12-22 RX ORDER — OXYCODONE HYDROCHLORIDE AND ACETAMINOPHEN 5; 325 MG/1; MG/1
TABLET ORAL
Status: DISCONTINUED
Start: 2017-12-22 | End: 2017-12-23 | Stop reason: HOSPADM

## 2017-12-22 RX ORDER — MEPERIDINE HYDROCHLORIDE 25 MG/ML
12.5 INJECTION INTRAMUSCULAR; INTRAVENOUS; SUBCUTANEOUS
Status: DISCONTINUED | OUTPATIENT
Start: 2017-12-22 | End: 2017-12-23 | Stop reason: HOSPADM

## 2017-12-22 RX ORDER — MEPERIDINE HYDROCHLORIDE 25 MG/ML
INJECTION INTRAMUSCULAR; INTRAVENOUS; SUBCUTANEOUS
Status: DISCONTINUED
Start: 2017-12-22 | End: 2017-12-23 | Stop reason: HOSPADM

## 2017-12-22 RX ADMIN — PROMETHAZINE HYDROCHLORIDE 6.25 MG: 25 INJECTION, SOLUTION INTRAMUSCULAR; INTRAVENOUS at 18:41

## 2017-12-22 RX ADMIN — OXYCODONE HYDROCHLORIDE AND ACETAMINOPHEN 1 TABLET: 5; 325 TABLET ORAL at 17:56

## 2017-12-22 RX ADMIN — MEPERIDINE HYDROCHLORIDE 12.5 MG: 25 INJECTION INTRAMUSCULAR; INTRAVENOUS; SUBCUTANEOUS at 17:28

## 2017-12-22 RX ADMIN — ONDANSETRON 4 MG: 2 INJECTION INTRAMUSCULAR; INTRAVENOUS at 17:56

## 2017-12-22 RX ADMIN — GLYCOPYRROLATE 0.1 MG: 0.2 INJECTION INTRAMUSCULAR; INTRAVENOUS at 12:32

## 2017-12-22 ASSESSMENT — PAIN DESCRIPTION - PAIN TYPE: TYPE: SURGICAL PAIN

## 2017-12-22 ASSESSMENT — PAIN SCALES - GENERAL
PAINLEVEL_OUTOF10: 6
PAINLEVEL_OUTOF10: 0
PAINLEVEL_OUTOF10: 4
PAINLEVEL_OUTOF10: 3

## 2017-12-22 ASSESSMENT — PAIN - FUNCTIONAL ASSESSMENT: PAIN_FUNCTIONAL_ASSESSMENT: 0-10

## 2017-12-22 ASSESSMENT — PAIN DESCRIPTION - LOCATION: LOCATION: ABDOMEN

## 2017-12-22 NOTE — ANESTHESIA PRE-OP
NEURO/PSYCH: no CVA   no Seizure Disorder +h/o drug abuse   MUSCULOSKELETAL: no DJD   HEME/ONC: no DVT  no PE   no Anemia      OTHER:   EKG:   Echocardiogram:   COMMENTS:        PSH:  has a past surgical history that includes [place] tooth extraction and Upper gastrointestinal endoscopy. Patient Active Problem List   Diagnosis    Annual physical exam    Allergic rhinitis    Asthma    ADD (attention deficit disorder)    Drug abuse, opioid type    Oppositional defiant disorder    Amphetamine abuse    Nondependent opioid abuse    Cannabis abuse    Benzodiazepine dependence ([facility] [address]    Anxiety disorder    Insomnia    PND (paroxysmal nocturnal dyspnea)    Gluten-induced enteropathy    Vitamin D deficiency    Chronic nonseasonal allergic rhinitis due to pollen    Mild intermittent asthma without complication     PMH:  Past Medical History:   Diagnosis Date    ADD (attention deficit disorder) 11/17/2014    Allergic rhinitis 9/4/2013    Allergy     Asthma 9/4/2013    Biliary dyskinesia     Gluten-induced enteropathy 12/17/2015      PERSONAL/FAMILY ANESTHESIA PROBLEMS: no     AIRWAY ASSESSMENT:  MALLAMPATI: II DENTITION: no chipped or loose teeth ROM: full     ANESTHETIC PLAN: GA with standard ASA monitors    If zahida-operative block planned, describe:    CONSENT: Risks/benefits/options/questions discussed.  Patient agrees:  yes

## 2017-12-22 NOTE — H&P
ranitidine (ZANTAC) 150 MG capsule TAKE ONE CAPSULE BY MOUTH IN MORNING 12/2/17  Yes Historical Provider, MD   traMADol (ULTRAM) 50 MG tablet TAKE 1 TABLET BY MOUTH EVERY 6 HOURS AS NEEDED FOR PAIN 11/21/17  Yes Historical Provider, MD   dexlansoprazole (DEXILANT) 60 MG CPDR delayed release capsule Take 1 capsule by mouth daily  Patient taking differently: Take 60 mg by mouth nightly  10/9/17  Yes Marek Cosby MD   albuterol sulfate HFA (PROAIR HFA) 108 (90 Base) MCG/ACT inhaler Inhale 2 puffs into the lungs every 6 hours as needed for Wheezing or Shortness of Breath 10/9/17  Yes Marek Cosby MD   sucralfate (CARAFATE) 1 GM tablet Take 1 tablet by mouth 4 times daily  Patient taking differently: Take 1 g by mouth 4 times daily as needed  9/15/17  Yes Juan Diego Schofield MD         Allergies:  Amoxicillin; Ceclor [cefaclor]; and Gluten meal    PHYSICAL EXAM:      /87   Pulse 79   Temp 97.6 °F (36.4 °C) (Oral)   Resp 16   Ht 5' 8\" (1.727 m)   Wt 130 lb (59 kg)   SpO2 95%   BMI 19.77 kg/m²      Heart:  regular rate and rhythm,no murmur     Lungs:  No increased work of breathing, good air exchange, clear to auscultation bilaterally, no crackles or wheezing    Abdomen:  soft, non-distended, tender mid abdomen with palpation, no rebound tenderness or guarding, normal active bowel sounds and no masses palpated    ASSESSMENT AND PLAN:    1. Patient seen and focused exam done today- no new changes since last physical exam on 12/19/17    2. Access to ancillary services are available per request of the provider.     Carlos Blackwell CNP     12/22/2017

## 2017-12-23 NOTE — OP NOTE
Operative Dictation    ATTENDING SURGEON: Charli Shin MD   RESIDENT SURGEON: Carlos Martinez DO PGY 4    ANESTHESIA: General and Local  ESTIMATED BLOOD LOSS: 5 ml  PRE-OPERATIVE DIAGNOSIS: Biliary dyskinesia  POST-OPERATIVE DIAGNOSIS: Same    PROCEDURE: Laparoscopic cholecystectomy with intra-operative cholangiogram, intraop interpretation of fluoroscopy  COMPLICATIONS: None  SPECIMEN: gallbladder  FINDINGS: normal cholangiogram    INDICATIONS: This is a 26 yo male who presented with several years of intermittent upper abdominal pain. Diet modifications failed to improve the symptoms and EGD only showed gastric erythema. Since the episodes have increased in frequency and associated with eating heavy meals the patient a HIDA scan was performed which showed a reduced EF consistent with biliary dyskinesia. The decision was made to remove the gallbladder laparoscopically in the operating room. Risks (persistence of symptoms, injury to surrounding structures such as CBD, infection, need for additional procedures), benefits and alternatives were explained to the patient and the patient consented freely. DETAILS OF PROCEDURE:  Prior to the induction of general anesthesia, antibiotic prophylaxis was administered. Time out was called. General endotracheal anesthesia was then administered and tolerated well. After induction, the abdomen was prepped in the usual sterile fashion. Local anesthetic agent was injected into the infraumbilical skin and an incision made horizontally. Blunt dissection with S retractors and hemostat was performed to reach the fascia. The fascia was grasped with towel clips, pulled into the field and incised with an #11 scalpel. The peritoneum was then entered in similar fashion. Next a 10 mm port was introduced with a Noe cannula under direct vision.  The abdomen was insufflated with carbon dioxide to 12-15 mm Hg and tolerated well without any adverse changes in the patient's vital signs. Three additional 5 mm trocars were introduced in the RUQ under direct vision after local anesthetic was injected and small incisions made with the scalpel. The gallbladder was identified, the fundus grasped and retracted cephalad. Filmy adhesions were lysed bluntly and with the electrocautery, taking care not to injure any adjacent organs or viscus. The infundibulum was grasped and retracted laterally, exposing the peritoneum overlying the triangle of Calot. This was divided and exposed in a blunt fashion. The cystic duct was clearly identified and bluntly dissected circumferentially. The junctions of the gallbladder, cystic duct and common bile duct were identified prior to the division of any structure. The cystic duct was ligated with a single surgical clip near the neck of gallbladder. A nick was made with scissors in the cystic duct just below the clip. Next the cholangiogram catheter was threaded into the duct and secured using a Blanco clamp. The study showed good visualization of the distal and proximal biliary tree. The common bile duct was without any obvious filling defects and contrast was free flowing into he duodenum. The catheter was then removed. The cystic duct and cystic artery were then doubly clipped and divided. Additional clips were placed on small arterial branches. The gallbladder was dissected from the liver bed with the electrocautery and removed with an endoscopic retrieval bag. Bile was spilled during dissection. The liver bed was irrigated and inspected. Hemostasis was achieved with the electrocautery. Copious irrigation was utilized and was repeatedly suctioned until all particulate matter and bile was cleared. The cystic duct and cystic artery did not show evidence of leakage. Pneumoperitoneum was completely reduced after removal of the trocars under direct vision. No bleeding was noted at the trochar sites.  The fascia of the 10 mm port was then closed with a figure of eight 0 vicryl suture; the skin of all port sites was then closed with subcuticular Monocryl sutures and dermal adhesive. The patient tolerated the procedure well, awakened from anesthesia and taken to PACU in stable condition. Instrument, sponge, and needle counts were correct at closure and at the conclusion of the case. Dr. Sundeep Gonzalez was scrubbed and present for the entire case. DO LUCIO Barksdale was present for the entire procedure. Agree with the above note and changes made accordingly.      Jacinta Herrera MD  Surgery Attending

## 2017-12-23 NOTE — PROGRESS NOTES
Ambulatory Surgery/Procedure Discharge Note    Vitals:    12/22/17 2000   BP: 110/63   Pulse: 94   Resp: 10   Temp: 96.9 °F (36.1 °C)   SpO2: 97%       In: 2450 [P.O.:250; I.V.:2200]  Out: -     Pain assessment:   Pain Level: 3    Patient discharged to home/self care.  Patient discharged via wheel chair by transporter to waiting family/S.O.       12/22/2017 8:34 PM
Pt feeling better with pain level a 4, denies nausea at this time
Pt unable to void, bladder scanned for 230 ml, pt states that he has issues with urinary frequency, assisted back to restroom
[facility] / [facility] ([facility]) [address] [facility], [address]    Acknowledgment of Informed Consent for Surgical or Medical Procedure and Sedation  I agree to allow doctor(s) JUNIOR EM and his/her associates or assistants, including residents and/or other qualified medical practitioner to perform the following medical treatment or procedure and to administer or direct the administration of sedation as necessary:  Procedure(s): LAPAROSCOPIC CHOLECYSTECTOMY WITH INTRAOPERATIVE CHOLANGIOGRAM  My doctor has explained the following regarding the proposed procedure:   the explanation of the procedure   the benefits of the procedure   the potential problems that might occur during recuperation   the risks and side effects of the procedure which could include but are not limited to severe blood loss, infection, stroke or death   the benefits, risks and side effect of alternative procedures including the consequences of declining this procedure or any alternative procedures   the likelihood of achieving satisfactory results. I acknowledge no guarantee or assurance has been made to me regarding the results. I understand that during the course of this treatment/procedure, unforeseen conditions can occur which require an additional or different procedure. I agree to allow my physician or assistants to perform such extension of the original procedure as they may find necessary. I understand that sedation will often result in temporary impairment of memory and fine motor skills and that sedation can occasionally progress to a state of deep sedation or general anesthesia. I understand the risks of anesthesia for surgery include, but are not limited to, sore throat, hoarseness, injury to face, mouth, or teeth; nausea; headache; injury to blood vessels or nerves; death, brain damage, or paralysis.     I understand that if I have a Limitation of Treatment order in effect during my
assistance prior to getting out of bed during hospitalization      Infection Precautions                                                                                            [x] Patient understands implementation of Surgical Site Infection precautions (see [facility] Presurgical Instructions)     Patient Safety  [x] Patient identification verified  [x] Site verified    Instructions - Discharge Planning for Outpatients  [x] Patient / significant other voices understanding of home care and follow up procedures  [x] Encourage patient / significant other to review discharge instructions the day after procedure due to sedation on day of surgery    Anticipated Special Needs upon discharge:        [] Cooling device        [] Crutches       [] Donzella Sophia        [] Wound Support device        [] Drain        [] Other       Instructions - Discharge Planning for Admitted patients  [] Patient / significant other understands plan for admission after surgery  [] Patient / significant other understands plan for anticipated discharge dispostion        12/21/2017 1:54 PM Sharmin Juan

## 2017-12-23 NOTE — ANESTHESIA POST-OP
ANESTHESIA POST-OPERATIVE NOTE    Patient Name: Rachael Santillan YOB: 1995   Sex: Male Age: 25 yrs     Medical Record Number: [id number] Acct Number: [de-identified]   Room Number: Room/bed info not found Hospital Day: Hospital Day: 1       Date of Procedure: 12/22/2017     Pre-operative Diagnosis: Bilary dyskinesia     Post-operative Diagnosis: Same     Procedure: Laparoscopic cholecystectomy with ioc, intraop fluoroscopic interpretation       Surgeon: Deion Robertson MD     VITAL SIGNS   Vitals:    12/22/17 1745 12/22/17 1800 12/22/17 1830 12/22/17 1833   BP: 129/80 126/76 110/69    Pulse: 88 83 89 86   Resp: 17 16 9 (!) 5   Temp:       TempSrc:       SpO2: 100%  100% 100%   Weight:       Height:          Height Height: 5' 8\" (172.7 cm)   Weight Weight: 130 lb (59 kg)   BMI Body mass index is 19.77 kg/m². Post-op vital signs: stable. Please refer to nursing notes for full set of vitals while in PACU. ANESTHESIA   Anesthesia Type general Patient Location PACU     ASSESSMENT   Level of Consciousness awake, alert and oriented   The patient had no apparent anesthetic complications, tolerated the procedure well, & had no evidence of recall. Cardiovascular System stable   Ventilator or ETT ? no Airway patent? yes   The patient is on the ventilator and/or sedated therefore unable participate in the post-operative evaluation: no     Post-Operative Pain adequate analgesia   Nausea and vomiting? no   Hydration Status euvolemic   Nerve Block no     Neda Phase I & II Please refer to nursing notes for this information.      Complications none   Comments none       Electronically signed by Eitan Poe MD on 12/22/17      KYRIE

## 2017-12-27 ENCOUNTER — TELEPHONE (OUTPATIENT)
Dept: SURGERY | Age: 22
End: 2017-12-27

## 2017-12-29 ENCOUNTER — OFFICE VISIT (OUTPATIENT)
Dept: FAMILY MEDICINE CLINIC | Age: 22
End: 2017-12-29

## 2017-12-29 VITALS
OXYGEN SATURATION: 100 % | HEART RATE: 85 BPM | WEIGHT: 134 LBS | TEMPERATURE: 98.5 F | SYSTOLIC BLOOD PRESSURE: 116 MMHG | DIASTOLIC BLOOD PRESSURE: 78 MMHG | BODY MASS INDEX: 20.37 KG/M2

## 2017-12-29 DIAGNOSIS — H66.90 ACUTE OTITIS MEDIA, UNSPECIFIED OTITIS MEDIA TYPE: Primary | ICD-10-CM

## 2018-01-10 ASSESSMENT — ENCOUNTER SYMPTOMS
ALLERGIC/IMMUNOLOGIC NEGATIVE: 1
RHINORRHEA: 0
DIARRHEA: 0
RESPIRATORY NEGATIVE: 1
ABDOMINAL PAIN: 0
VOMITING: 0
SORE THROAT: 0
EYES NEGATIVE: 1
COUGH: 0
GASTROINTESTINAL NEGATIVE: 1

## 2018-01-10 NOTE — PROGRESS NOTES
1/10/18    Alisson Ivory  1995      Chief Complaint   Patient presents with    Otalgia     R ear, painful, can't hear, ringing in ear       Vitals:    12/29/17 1641   BP: 116/78   Pulse: 85   Temp: 98.5 °F (36.9 °C)   SpO2: 100%         Immunization History   Administered Date(s) Administered    Meningococcal MCV4P (Menactra) 05/03/2010    Tdap (Boostrix, Adacel) 08/03/2010    Varicella (Varivax) 03/11/2003, 05/03/2010       Allergies   Allergen Reactions    Amoxicillin Hives    Ceclor [Cefaclor]      Joints swelled     Gluten Meal Nausea And Vomiting     fatigue     Outpatient Prescriptions Marked as Taking for the 12/29/17 encounter (Office Visit) with Bailey Fierro NP   Medication Sig Dispense Refill    dicyclomine (BENTYL) 20 MG tablet 1 (ONE) TABLET EVERY SIX HOURS AS NEEDED  0    ranitidine (ZANTAC) 150 MG capsule TAKE ONE CAPSULE BY MOUTH IN MORNING  5    traMADol (ULTRAM) 50 MG tablet TAKE 1 TABLET BY MOUTH EVERY 6 HOURS AS NEEDED FOR PAIN  0    dexlansoprazole (DEXILANT) 60 MG CPDR delayed release capsule Take 1 capsule by mouth daily (Patient taking differently: Take 60 mg by mouth nightly ) 30 capsule 3    albuterol sulfate HFA (PROAIR HFA) 108 (90 Base) MCG/ACT inhaler Inhale 2 puffs into the lungs every 6 hours as needed for Wheezing or Shortness of Breath 3 Inhaler 1    sucralfate (CARAFATE) 1 GM tablet Take 1 tablet by mouth 4 times daily (Patient taking differently: Take 1 g by mouth 4 times daily as needed ) 120 tablet 3       Past Medical History:   Diagnosis Date    ADD (attention deficit disorder) 11/17/2014    Allergic rhinitis 9/4/2013    Allergy     Asthma 9/4/2013    Biliary dyskinesia     Gluten-induced enteropathy 12/17/2015     Past Surgical History:   Procedure Laterality Date    CHOLECYSTECTOMY, LAPAROSCOPIC  12/22/2017    lap-cristine    UPPER GASTROINTESTINAL ENDOSCOPY      WISDOM TOOTH EXTRACTION       Family History   Problem Relation Age of Onset    Negative. Physical Exam   Constitutional: He is oriented to person, place, and time. He appears well-developed and well-nourished. HENT:   Head: Normocephalic. Right Ear: External ear normal. Tympanic membrane is erythematous. Left Ear: External ear normal.   Mouth/Throat: Oropharynx is clear and moist. No oropharyngeal exudate. Eyes: Conjunctivae and EOM are normal. Pupils are equal, round, and reactive to light. Neck: Normal range of motion. Neck supple. Cardiovascular: Normal rate, regular rhythm, normal heart sounds and intact distal pulses. Pulmonary/Chest: Effort normal and breath sounds normal. No respiratory distress. He has no wheezes. He has no rales. Lymphadenopathy:     He has no cervical adenopathy. Neurological: He is alert and oriented to person, place, and time. Skin: Skin is warm and dry. Psychiatric: He has a normal mood and affect.  His behavior is normal. Judgment and thought content normal.         1. Acute otitis media, unspecified otitis media type  States that he does not want to take abx, will monitor              Kisha Smith NP

## 2018-03-16 DIAGNOSIS — J45.30 REACTIVE AIRWAY DISEASE, MILD PERSISTENT, UNCOMPLICATED: ICD-10-CM

## 2018-03-16 RX ORDER — ALBUTEROL SULFATE 90 UG/1
2 AEROSOL, METERED RESPIRATORY (INHALATION) EVERY 6 HOURS PRN
Qty: 3 INHALER | Refills: 1 | Status: SHIPPED | OUTPATIENT
Start: 2018-03-16 | End: 2021-12-17 | Stop reason: SDUPTHER

## 2020-12-29 ENCOUNTER — HOSPITAL ENCOUNTER (EMERGENCY)
Age: 25
Discharge: HOME | End: 2020-12-29
Attending: EMERGENCY MEDICINE
Payer: COMMERCIAL

## 2020-12-29 VITALS
DIASTOLIC BLOOD PRESSURE: 80 MMHG | OXYGEN SATURATION: 98 % | SYSTOLIC BLOOD PRESSURE: 131 MMHG | RESPIRATION RATE: 23 BRPM | TEMPERATURE: 98.1 F | HEART RATE: 76 BPM

## 2020-12-29 LAB
ALBUMIN SERPL-MCNC: 5 G/DL (ref 3.4–5)
ALP BLD-CCNC: 62 U/L (ref 40–129)
ALT SERPL-CCNC: 17 U/L (ref 10–40)
AMPHETAMINE SCREEN, URINE: ABNORMAL
ANION GAP SERPL CALCULATED.3IONS-SCNC: 19 MMOL/L (ref 3–16)
AST SERPL-CCNC: 27 U/L (ref 15–37)
BARBITURATE SCREEN URINE: ABNORMAL
BASOPHILS ABSOLUTE: 0 K/UL (ref 0–0.2)
BASOPHILS RELATIVE PERCENT: 0.3 %
BENZODIAZEPINE SCREEN, URINE: POSITIVE
BILIRUB SERPL-MCNC: 0.5 MG/DL (ref 0–1)
BILIRUBIN DIRECT: <0.2 MG/DL (ref 0–0.3)
BILIRUBIN URINE: NEGATIVE
BILIRUBIN, INDIRECT: NORMAL MG/DL (ref 0–1)
BLOOD, URINE: NEGATIVE
BUN BLDV-MCNC: 4 MG/DL (ref 7–20)
CALCIUM SERPL-MCNC: 10.9 MG/DL (ref 8.3–10.6)
CANNABINOID SCREEN URINE: POSITIVE
CHLORIDE BLD-SCNC: 99 MMOL/L (ref 99–110)
CLARITY: CLEAR
CO2: 22 MMOL/L (ref 21–32)
COCAINE METABOLITE SCREEN URINE: POSITIVE
COLOR: YELLOW
CREAT SERPL-MCNC: 0.9 MG/DL (ref 0.9–1.3)
EKG ATRIAL RATE: 96 BPM
EKG DIAGNOSIS: NORMAL
EKG P AXIS: 81 DEGREES
EKG P-R INTERVAL: 148 MS
EKG Q-T INTERVAL: 366 MS
EKG QRS DURATION: 104 MS
EKG QTC CALCULATION (BAZETT): 462 MS
EKG R AXIS: 87 DEGREES
EKG T AXIS: 72 DEGREES
EKG VENTRICULAR RATE: 96 BPM
EOSINOPHILS ABSOLUTE: 0.1 K/UL (ref 0–0.6)
EOSINOPHILS RELATIVE PERCENT: 0.8 %
GFR AFRICAN AMERICAN: >60
GFR NON-AFRICAN AMERICAN: >60
GLUCOSE BLD-MCNC: 114 MG/DL (ref 70–99)
GLUCOSE URINE: NEGATIVE MG/DL
HCT VFR BLD CALC: 50.4 % (ref 40.5–52.5)
HEMOGLOBIN: 17.4 G/DL (ref 13.5–17.5)
KETONES, URINE: 40 MG/DL
LEUKOCYTE ESTERASE, URINE: NEGATIVE
LIPASE: 40 U/L (ref 13–60)
LYMPHOCYTES ABSOLUTE: 1 K/UL (ref 1–5.1)
LYMPHOCYTES RELATIVE PERCENT: 10.7 %
Lab: ABNORMAL
MCH RBC QN AUTO: 30.8 PG (ref 26–34)
MCHC RBC AUTO-ENTMCNC: 34.6 G/DL (ref 31–36)
MCV RBC AUTO: 89.1 FL (ref 80–100)
METHADONE SCREEN, URINE: ABNORMAL
MICROSCOPIC EXAMINATION: ABNORMAL
MONOCYTES ABSOLUTE: 0.7 K/UL (ref 0–1.3)
MONOCYTES RELATIVE PERCENT: 7.4 %
NEUTROPHILS ABSOLUTE: 7.8 K/UL (ref 1.7–7.7)
NEUTROPHILS RELATIVE PERCENT: 80.8 %
NITRITE, URINE: NEGATIVE
OPIATE SCREEN URINE: ABNORMAL
OXYCODONE URINE: ABNORMAL
PDW BLD-RTO: 12.7 % (ref 12.4–15.4)
PH UA: 8.5
PH UA: 8.5 (ref 5–8)
PHENCYCLIDINE SCREEN URINE: ABNORMAL
PLATELET # BLD: 392 K/UL (ref 135–450)
PMV BLD AUTO: 7.6 FL (ref 5–10.5)
POTASSIUM REFLEX MAGNESIUM: 4.1 MMOL/L (ref 3.5–5.1)
PROPOXYPHENE SCREEN: ABNORMAL
PROTEIN UA: NEGATIVE MG/DL
RBC # BLD: 5.65 M/UL (ref 4.2–5.9)
SODIUM BLD-SCNC: 140 MMOL/L (ref 136–145)
SPECIFIC GRAVITY UA: 1.01 (ref 1–1.03)
TOTAL PROTEIN: 7.7 G/DL (ref 6.4–8.2)
URINE TYPE: ABNORMAL
UROBILINOGEN, URINE: 0.2 E.U./DL
WBC # BLD: 9.7 K/UL (ref 4–11)

## 2020-12-29 PROCEDURE — 96374 THER/PROPH/DIAG INJ IV PUSH: CPT

## 2020-12-29 PROCEDURE — 93005 ELECTROCARDIOGRAM TRACING: CPT | Performed by: EMERGENCY MEDICINE

## 2020-12-29 PROCEDURE — 2580000003 HC RX 258: Performed by: EMERGENCY MEDICINE

## 2020-12-29 PROCEDURE — 80076 HEPATIC FUNCTION PANEL: CPT

## 2020-12-29 PROCEDURE — 83690 ASSAY OF LIPASE: CPT

## 2020-12-29 PROCEDURE — 96372 THER/PROPH/DIAG INJ SC/IM: CPT

## 2020-12-29 PROCEDURE — 99283 EMERGENCY DEPT VISIT LOW MDM: CPT

## 2020-12-29 PROCEDURE — C9113 INJ PANTOPRAZOLE SODIUM, VIA: HCPCS | Performed by: EMERGENCY MEDICINE

## 2020-12-29 PROCEDURE — 80048 BASIC METABOLIC PNL TOTAL CA: CPT

## 2020-12-29 PROCEDURE — 96375 TX/PRO/DX INJ NEW DRUG ADDON: CPT

## 2020-12-29 PROCEDURE — 80307 DRUG TEST PRSMV CHEM ANLYZR: CPT

## 2020-12-29 PROCEDURE — 6360000002 HC RX W HCPCS: Performed by: EMERGENCY MEDICINE

## 2020-12-29 PROCEDURE — 85025 COMPLETE CBC W/AUTO DIFF WBC: CPT

## 2020-12-29 PROCEDURE — 81003 URINALYSIS AUTO W/O SCOPE: CPT

## 2020-12-29 RX ORDER — SODIUM CHLORIDE, SODIUM LACTATE, POTASSIUM CHLORIDE, AND CALCIUM CHLORIDE .6; .31; .03; .02 G/100ML; G/100ML; G/100ML; G/100ML
1000 INJECTION, SOLUTION INTRAVENOUS ONCE
Status: COMPLETED | OUTPATIENT
Start: 2020-12-29 | End: 2020-12-29

## 2020-12-29 RX ORDER — DICYCLOMINE HYDROCHLORIDE 10 MG/ML
20 INJECTION INTRAMUSCULAR ONCE
Status: COMPLETED | OUTPATIENT
Start: 2020-12-29 | End: 2020-12-29

## 2020-12-29 RX ORDER — DROPERIDOL 2.5 MG/ML
1.25 INJECTION, SOLUTION INTRAMUSCULAR; INTRAVENOUS ONCE
Status: COMPLETED | OUTPATIENT
Start: 2020-12-29 | End: 2020-12-29

## 2020-12-29 RX ORDER — ONDANSETRON 2 MG/ML
4 INJECTION INTRAMUSCULAR; INTRAVENOUS ONCE
Status: COMPLETED | OUTPATIENT
Start: 2020-12-29 | End: 2020-12-29

## 2020-12-29 RX ORDER — DEXTROSE AND SODIUM CHLORIDE 5; .9 G/100ML; G/100ML
INJECTION, SOLUTION INTRAVENOUS ONCE
Status: COMPLETED | OUTPATIENT
Start: 2020-12-29 | End: 2020-12-29

## 2020-12-29 RX ORDER — PANTOPRAZOLE SODIUM 40 MG/10ML
40 INJECTION, POWDER, LYOPHILIZED, FOR SOLUTION INTRAVENOUS ONCE
Status: COMPLETED | OUTPATIENT
Start: 2020-12-29 | End: 2020-12-29

## 2020-12-29 RX ADMIN — DROPERIDOL 1.25 MG: 2.5 INJECTION, SOLUTION INTRAMUSCULAR; INTRAVENOUS at 17:07

## 2020-12-29 RX ADMIN — DEXTROSE AND SODIUM CHLORIDE: 5; 900 INJECTION, SOLUTION INTRAVENOUS at 21:04

## 2020-12-29 RX ADMIN — DICYCLOMINE HYDROCHLORIDE 20 MG: 20 INJECTION, SOLUTION INTRAMUSCULAR at 21:04

## 2020-12-29 RX ADMIN — PANTOPRAZOLE SODIUM 40 MG: 40 INJECTION, POWDER, FOR SOLUTION INTRAVENOUS at 21:05

## 2020-12-29 RX ADMIN — SODIUM CHLORIDE, POTASSIUM CHLORIDE, SODIUM LACTATE AND CALCIUM CHLORIDE 1000 ML: 600; 310; 30; 20 INJECTION, SOLUTION INTRAVENOUS at 17:07

## 2020-12-29 RX ADMIN — ONDANSETRON 4 MG: 2 INJECTION INTRAMUSCULAR; INTRAVENOUS at 21:05

## 2020-12-29 NOTE — ED PROVIDER NOTES
[address] [facility]          ATTENDING PHYSICIAN NOTE       Date of evaluation: 12/29/2020    Chief Complaint     Abdominal Pain      History of Present Illness     Pam Meadows is a 22 y.o. male with a PMH as described below who presents to the ED today with a chief complaint of: Abdominal pain and vomiting. The patient presents with 1 day of abdominal pain. He states that his \"tummy hurts \"and notes some associated nausea and vomiting. He states that his pain is in his upper abdomen. He does have a history of cholecystectomy in the past.  Patient does have an extensive medical history including polysubstance abuse, chronic abdominal pain, chronic vomiting. History is somewhat limited as the patient does seem to have some cognitive delay. He denies any fever chills but is tremulous at bedside. He denies any testicular pain, diarrhea or change in urination. The patientstates that there were no other associated signs and symptoms or modifying factors. Patient rates pain as 10/10. Review of Systems     As described above in the HPI otherwise all the systems reviewed and negative as reported by the patient. Past Medical, Surgical, Family, and Social History     He has a past medical history of ADD (attention deficit disorder), Allergic rhinitis, Allergy, Asthma, Biliary dyskinesia, and Gluten-induced enteropathy. He has a past surgical history that includes [place] tooth extraction; Upper gastrointestinal endoscopy; and Cholecystectomy, laparoscopic (12/22/2017). His family history includes Cancer in his maternal grandfather; Diabetes in his paternal grandmother; Heart Disease in his maternal grandfather; High Blood Pressure in his maternal grandfather; High Cholesterol in his maternal grandfather. He reports that he has been smoking e-cigarettes. He has a 1.50 pack-year smoking history.  He uses smokeless tobacco. He reports that he does not drink alcohol or use drugs.    Medications     Discharge Medication List as of 12/29/2020 10:39 PM      CONTINUE these medications which have NOT CHANGED    Details   albuterol sulfate HFA (PROAIR HFA) 108 (90 Base) MCG/ACT inhaler Inhale 2 puffs into the lungs every 6 hours as needed for Wheezing or Shortness of Breath, Disp-3 Inhaler, R-1Normal      dicyclomine (BENTYL) 20 MG tablet 1 (ONE) TABLET EVERY SIX HOURS AS NEEDED, R-0Historical Med      ranitidine (ZANTAC) 150 MG capsule TAKE ONE CAPSULE BY MOUTH IN MORNING, R-5Historical Med      traMADol (ULTRAM) 50 MG tablet TAKE 1 TABLET BY MOUTH EVERY 6 HOURS AS NEEDED FOR PAIN, R-0Historical Med      dexlansoprazole (DEXILANT) 60 MG CPDR delayed release capsule Take 1 capsule by mouth daily, Disp-30 capsule, R-3Print      sucralfate (CARAFATE) 1 GM tablet Take 1 tablet by mouth 4 times daily, Disp-120 tablet, R-3Print             Allergies     He is allergic to amoxicillin; ceclor [cefaclor]; and gluten meal.    Physical Exam     INITIAL VITALS: BP: (!) 151/90, Temp: 98.1 °F (36.7 °C), Pulse: 72, Resp: 22, SpO2: 100 %   Physical Exam  Vitals signs and nursing note reviewed. Constitutional:       Appearance: He is normal weight. HENT:      Head: Normocephalic and atraumatic. Cardiovascular:      Rate and Rhythm: Normal rate and regular rhythm. Pulmonary:      Effort: Pulmonary effort is normal.      Breath sounds: Normal breath sounds. Abdominal:      Comments: Upper epigastric and right upper quadrant pain with light touch. No rebound tenderness or involuntary guarding. With distraction patient does not seem to complain of pain. Genitourinary:     Comments: No testicular pain. No scrotal swelling. Skin:     General: Skin is warm. Capillary Refill: Capillary refill takes less than 2 seconds. Neurological:      General: No focal deficit present. Mental Status: He is alert. Comments:  At baseline         DiagnosticResults     EKG   Indication: Abdominal pain: Ventricular rate 96, RI interval 148, , QTc 462, axis 87 degrees. Normal sinus rhythm. Incomplete right bundle branch block. No acute ST-T wave elevations or depressions concerning for acute ischemia infarct. No prior EKG for comparison.     RADIOLOGY:  No orders to display       LABS:   Results for orders placed or performed during the hospital encounter of 12/29/20   CBC auto differential   Result Value Ref Range    WBC 9.7 4.0 - 11.0 K/uL    RBC 5.65 4.20 - 5.90 M/uL    Hemoglobin 17.4 13.5 - 17.5 g/dL    Hematocrit 50.4 40.5 - 52.5 %    MCV 89.1 80.0 - 100.0 fL    MCH 30.8 26.0 - 34.0 pg    MCHC 34.6 31.0 - 36.0 g/dL    RDW 12.7 12.4 - 15.4 %    Platelets 791 333 - 139 K/uL    MPV 7.6 5.0 - 10.5 fL    Neutrophils % 80.8 %    Lymphocytes % 10.7 %    Monocytes % 7.4 %    Eosinophils % 0.8 %    Basophils % 0.3 %    Neutrophils Absolute 7.8 (H) 1.7 - 7.7 K/uL    Lymphocytes Absolute 1.0 1.0 - 5.1 K/uL    Monocytes Absolute 0.7 0.0 - 1.3 K/uL    Eosinophils Absolute 0.1 0.0 - 0.6 K/uL    Basophils Absolute 0.0 0.0 - 0.2 K/uL   Basic Metabolic Panel w/ Reflex to MG   Result Value Ref Range    Sodium 140 136 - 145 mmol/L    Potassium reflex Magnesium 4.1 3.5 - 5.1 mmol/L    Chloride 99 99 - 110 mmol/L    CO2 22 21 - 32 mmol/L    Anion Gap 19 (H) 3 - 16    Glucose 114 (H) 70 - 99 mg/dL    BUN 4 (L) 7 - 20 mg/dL    CREATININE 0.9 0.9 - 1.3 mg/dL    GFR Non-African American >60 >60    GFR African American >60 >60    Calcium 10.9 (H) 8.3 - 10.6 mg/dL   Hepatic function panel (LFTs)   Result Value Ref Range    Total Protein 7.7 6.4 - 8.2 g/dL    Alb 5.0 3.4 - 5.0 g/dL    Alkaline Phosphatase 62 40 - 129 U/L    ALT 17 10 - 40 U/L    AST 27 15 - 37 U/L    Total Bilirubin 0.5 0.0 - 1.0 mg/dL    Bilirubin, Direct <0.2 0.0 - 0.3 mg/dL    Bilirubin, Indirect see below 0.0 - 1.0 mg/dL   Lipase   Result Value Ref Range    Lipase 40.0 13.0 - 60.0 U/L   Urinalysis, reflex to microscopic (Lab)   Result Value Ref Range Color, UA Yellow Straw/Yellow    Clarity, UA Clear Clear    Glucose, Ur Negative Negative mg/dL    Bilirubin Urine Negative Negative    Ketones, Urine 40 (A) Negative mg/dL    Specific Gravity, UA 1.015 1.005 - 1.030    Blood, Urine Negative Negative    pH, UA 8.5 (A) 5.0 - 8.0    Protein, UA Negative Negative mg/dL    Urobilinogen, Urine 0.2 <2.0 E.U./dL    Nitrite, Urine Negative Negative    Leukocyte Esterase, Urine Negative Negative    Microscopic Examination Not Indicated     Urine Type Voided    Urine Drug Screen   Result Value Ref Range    Amphetamine Screen, Urine Neg Negative <1000ng/mL    Barbiturate Screen, Ur Neg Negative <200 ng/mL    Benzodiazepine Screen, Urine POSITIVE (A) Negative <200 ng/mL    Cannabinoid Scrn, Ur POSITIVE (A) Negative <50 ng/mL    Cocaine Metabolite Screen, Urine POSITIVE (A) Negative <300 ng/mL    Opiate Scrn, Ur Neg Negative <300 ng/mL    PCP Screen, Urine Neg Negative <25 ng/mL    Methadone Screen, Urine Neg Negative <300 ng/mL    Propoxyphene Scrn, Ur Neg Negative <300 ng/mL    Oxycodone Urine Neg Negative <100 ng/ml    pH, UA 8.5     Drug Screen Comment: see below    EKG 12 Lead   Result Value Ref Range    Ventricular Rate 96 BPM    Atrial Rate 96 BPM    P-R Interval 148 ms    QRS Duration 104 ms    Q-T Interval 366 ms    QTc Calculation (Bazett) 462 ms    P Axis 81 degrees    R Axis 87 degrees    T Axis 72 degrees    Diagnosis       EKG performed in ER and to be interpreted by ER physician. Confirmed by MD, ER ([id number]),  Sai Adorno ([phone]) on 12/29/2020 5:45:07 PM       ED BEDSIDE ULTRASOUND:      RECENT VITALS:  BP: 131/80, Temp: 98.1 °F (36.7 °C),Pulse: 76, Resp: 23, SpO2: 98 %     Procedures         ED Course     Nursing Notes, Past Medical Hx, Past Surgical Hx, Social Hx, Allergies, and Family Hx werereviewed.     The patient was given thefollowing medications:  Orders Placed This Encounter   Medications    lactated ringers bolus    droperidol (INAPSINE) Audrey Horton MD  12/31/20 2969

## 2021-11-30 ENCOUNTER — OFFICE VISIT (OUTPATIENT)
Dept: INTERNAL MEDICINE CLINIC | Age: 26
End: 2021-11-30
Payer: MEDICARE

## 2021-11-30 VITALS
BODY MASS INDEX: 23.98 KG/M2 | WEIGHT: 158.2 LBS | HEIGHT: 68 IN | OXYGEN SATURATION: 98 % | DIASTOLIC BLOOD PRESSURE: 70 MMHG | HEART RATE: 88 BPM | TEMPERATURE: 97.7 F | SYSTOLIC BLOOD PRESSURE: 130 MMHG

## 2021-11-30 DIAGNOSIS — F32.1 CURRENT MODERATE EPISODE OF MAJOR DEPRESSIVE DISORDER WITHOUT PRIOR EPISODE (HCC): ICD-10-CM

## 2021-11-30 DIAGNOSIS — M79.604 PAIN IN BOTH LOWER EXTREMITIES: ICD-10-CM

## 2021-11-30 DIAGNOSIS — M79.605 PAIN IN BOTH LOWER EXTREMITIES: ICD-10-CM

## 2021-11-30 DIAGNOSIS — E55.9 VITAMIN D DEFICIENCY: ICD-10-CM

## 2021-11-30 DIAGNOSIS — F12.10 CANNABIS ABUSE: ICD-10-CM

## 2021-11-30 DIAGNOSIS — K90.41: ICD-10-CM

## 2021-11-30 DIAGNOSIS — R33.9 INCOMPLETE BLADDER EMPTYING: ICD-10-CM

## 2021-11-30 DIAGNOSIS — G62.9 NEUROPATHY: ICD-10-CM

## 2021-11-30 DIAGNOSIS — F41.1 GENERALIZED ANXIETY DISORDER: ICD-10-CM

## 2021-11-30 DIAGNOSIS — F11.10: ICD-10-CM

## 2021-11-30 DIAGNOSIS — R10.84 GENERALIZED ABDOMINAL PAIN: Primary | ICD-10-CM

## 2021-11-30 PROBLEM — F32.9 CURRENT EPISODE OF MAJOR DEPRESSIVE DISORDER WITHOUT PRIOR EPISODE: Status: ACTIVE | Noted: 2021-11-30

## 2021-11-30 LAB
A/G RATIO: 2.6 (ref 1.1–2.2)
ALBUMIN SERPL-MCNC: 4.7 G/DL (ref 3.4–5)
ALP BLD-CCNC: 43 U/L (ref 40–129)
ALT SERPL-CCNC: 12 U/L (ref 10–40)
ANION GAP SERPL CALCULATED.3IONS-SCNC: 13 MMOL/L (ref 3–16)
AST SERPL-CCNC: 11 U/L (ref 15–37)
BASOPHILS ABSOLUTE: 0.1 K/UL (ref 0–0.2)
BASOPHILS RELATIVE PERCENT: 0.9 %
BILIRUB SERPL-MCNC: <0.2 MG/DL (ref 0–1)
BILIRUBIN URINE: NEGATIVE
BLOOD, URINE: NEGATIVE
BUN BLDV-MCNC: 18 MG/DL (ref 7–20)
CALCIUM SERPL-MCNC: 9.6 MG/DL (ref 8.3–10.6)
CHLORIDE BLD-SCNC: 106 MMOL/L (ref 99–110)
CLARITY: CLEAR
CO2: 23 MMOL/L (ref 21–32)
COLOR: ABNORMAL
CREAT SERPL-MCNC: 0.8 MG/DL (ref 0.9–1.3)
EOSINOPHILS ABSOLUTE: 0.5 K/UL (ref 0–0.6)
EOSINOPHILS RELATIVE PERCENT: 8.1 %
FOLATE: 5.35 NG/ML (ref 4.78–24.2)
GFR AFRICAN AMERICAN: >60
GFR NON-AFRICAN AMERICAN: >60
GLUCOSE BLD-MCNC: 84 MG/DL (ref 70–99)
GLUCOSE URINE: NEGATIVE MG/DL
HCT VFR BLD CALC: 46.1 % (ref 40.5–52.5)
HEMOGLOBIN: 15.6 G/DL (ref 13.5–17.5)
KETONES, URINE: NEGATIVE MG/DL
LEUKOCYTE ESTERASE, URINE: NEGATIVE
LYMPHOCYTES ABSOLUTE: 1.6 K/UL (ref 1–5.1)
LYMPHOCYTES RELATIVE PERCENT: 23.9 %
MAGNESIUM: 2 MG/DL (ref 1.8–2.4)
MCH RBC QN AUTO: 30.7 PG (ref 26–34)
MCHC RBC AUTO-ENTMCNC: 33.7 G/DL (ref 31–36)
MCV RBC AUTO: 91.2 FL (ref 80–100)
MICROSCOPIC EXAMINATION: ABNORMAL
MONOCYTES ABSOLUTE: 0.7 K/UL (ref 0–1.3)
MONOCYTES RELATIVE PERCENT: 10.1 %
NEUTROPHILS ABSOLUTE: 3.9 K/UL (ref 1.7–7.7)
NEUTROPHILS RELATIVE PERCENT: 57 %
NITRITE, URINE: NEGATIVE
PDW BLD-RTO: 12.8 % (ref 12.4–15.4)
PH UA: 6.5 (ref 5–8)
PLATELET # BLD: 271 K/UL (ref 135–450)
PMV BLD AUTO: 7.9 FL (ref 5–10.5)
POTASSIUM SERPL-SCNC: 5.1 MMOL/L (ref 3.5–5.1)
PROTEIN UA: NEGATIVE MG/DL
RBC # BLD: 5.06 M/UL (ref 4.2–5.9)
REASON FOR REJECTION: NORMAL
REJECTED TEST: NORMAL
SEDIMENTATION RATE, ERYTHROCYTE: 2 MM/HR (ref 0–15)
SODIUM BLD-SCNC: 142 MMOL/L (ref 136–145)
SPECIFIC GRAVITY UA: 1.03 (ref 1–1.03)
T4 FREE: 1 NG/DL (ref 0.9–1.8)
TOTAL PROTEIN: 6.5 G/DL (ref 6.4–8.2)
TSH REFLEX: 0.74 UIU/ML (ref 0.27–4.2)
URINE REFLEX TO CULTURE: ABNORMAL
URINE TYPE: ABNORMAL
UROBILINOGEN, URINE: 0.2 E.U./DL
VITAMIN D 25-HYDROXY: 28.4 NG/ML
WBC # BLD: 6.8 K/UL (ref 4–11)

## 2021-11-30 PROCEDURE — G8420 CALC BMI NORM PARAMETERS: HCPCS | Performed by: INTERNAL MEDICINE

## 2021-11-30 PROCEDURE — 4004F PT TOBACCO SCREEN RCVD TLK: CPT | Performed by: INTERNAL MEDICINE

## 2021-11-30 PROCEDURE — G8427 DOCREV CUR MEDS BY ELIG CLIN: HCPCS | Performed by: INTERNAL MEDICINE

## 2021-11-30 PROCEDURE — G8484 FLU IMMUNIZE NO ADMIN: HCPCS | Performed by: INTERNAL MEDICINE

## 2021-11-30 PROCEDURE — 99204 OFFICE O/P NEW MOD 45 MIN: CPT | Performed by: INTERNAL MEDICINE

## 2021-11-30 RX ORDER — TAMSULOSIN HYDROCHLORIDE 0.4 MG/1
0.4 CAPSULE ORAL DAILY
COMMUNITY
End: 2022-07-20 | Stop reason: ALTCHOICE

## 2021-11-30 RX ORDER — CALCIUM CARBONATE 200(500)MG
1 TABLET,CHEWABLE ORAL DAILY PRN
Status: ON HOLD | COMMUNITY
End: 2022-09-16

## 2021-11-30 RX ORDER — IBUPROFEN 800 MG/1
TABLET ORAL 3 TIMES DAILY
COMMUNITY
Start: 2021-11-19 | End: 2022-05-24

## 2021-11-30 SDOH — ECONOMIC STABILITY: FOOD INSECURITY: WITHIN THE PAST 12 MONTHS, THE FOOD YOU BOUGHT JUST DIDN'T LAST AND YOU DIDN'T HAVE MONEY TO GET MORE.: NEVER TRUE

## 2021-11-30 SDOH — ECONOMIC STABILITY: FOOD INSECURITY: WITHIN THE PAST 12 MONTHS, YOU WORRIED THAT YOUR FOOD WOULD RUN OUT BEFORE YOU GOT MONEY TO BUY MORE.: NEVER TRUE

## 2021-11-30 ASSESSMENT — PATIENT HEALTH QUESTIONNAIRE - PHQ9
2. FEELING DOWN, DEPRESSED OR HOPELESS: 3
10. IF YOU CHECKED OFF ANY PROBLEMS, HOW DIFFICULT HAVE THESE PROBLEMS MADE IT FOR YOU TO DO YOUR WORK, TAKE CARE OF THINGS AT HOME, OR GET ALONG WITH OTHER PEOPLE: 3
8. MOVING OR SPEAKING SO SLOWLY THAT OTHER PEOPLE COULD HAVE NOTICED. OR THE OPPOSITE, BEING SO FIGETY OR RESTLESS THAT YOU HAVE BEEN MOVING AROUND A LOT MORE THAN USUAL: 3
1. LITTLE INTEREST OR PLEASURE IN DOING THINGS: 1
9. THOUGHTS THAT YOU WOULD BE BETTER OFF DEAD, OR OF HURTING YOURSELF: 0
5. POOR APPETITE OR OVEREATING: 3
6. FEELING BAD ABOUT YOURSELF - OR THAT YOU ARE A FAILURE OR HAVE LET YOURSELF OR YOUR FAMILY DOWN: 2
SUM OF ALL RESPONSES TO PHQ QUESTIONS 1-9: 21
4. FEELING TIRED OR HAVING LITTLE ENERGY: 3
SUM OF ALL RESPONSES TO PHQ QUESTIONS 1-9: 21
7. TROUBLE CONCENTRATING ON THINGS, SUCH AS READING THE NEWSPAPER OR WATCHING TELEVISION: 3
SUM OF ALL RESPONSES TO PHQ QUESTIONS 1-9: 21
SUM OF ALL RESPONSES TO PHQ9 QUESTIONS 1 & 2: 4
3. TROUBLE FALLING OR STAYING ASLEEP: 3

## 2021-11-30 ASSESSMENT — ENCOUNTER SYMPTOMS
WHEEZING: 0
TROUBLE SWALLOWING: 0
SORE THROAT: 0
SINUS PRESSURE: 0
SHORTNESS OF BREATH: 0
SINUS PAIN: 0
ABDOMINAL PAIN: 1
CHEST TIGHTNESS: 0
VOMITING: 0
RHINORRHEA: 0
COUGH: 0
EYE PAIN: 0
NAUSEA: 0
BLOOD IN STOOL: 0
EYE DISCHARGE: 0

## 2021-11-30 ASSESSMENT — SOCIAL DETERMINANTS OF HEALTH (SDOH): HOW HARD IS IT FOR YOU TO PAY FOR THE VERY BASICS LIKE FOOD, HOUSING, MEDICAL CARE, AND HEATING?: NOT HARD AT ALL

## 2021-11-30 ASSESSMENT — COLUMBIA-SUICIDE SEVERITY RATING SCALE - C-SSRS
1. WITHIN THE PAST MONTH, HAVE YOU WISHED YOU WERE DEAD OR WISHED YOU COULD GO TO SLEEP AND NOT WAKE UP?: NO
6. HAVE YOU EVER DONE ANYTHING, STARTED TO DO ANYTHING, OR PREPARED TO DO ANYTHING TO END YOUR LIFE?: NO
2. HAVE YOU ACTUALLY HAD ANY THOUGHTS OF KILLING YOURSELF?: NO

## 2021-11-30 NOTE — PROGRESS NOTES
2021    Ruth Ann Faye (: 1995) is a 32 y.o. male, here for evaluation of the following medical concerns:    Chief Complaint   Patient presents with   [address]     c/o of pains in legs, tingling in legs x few months, Unable to complete program due to muscle pain/drug program       Drug rehab-Narcanon--drug rehab in [facility]-addiction and he has been off and not having-pain legs ---neuropathy-not ruled out and has not seen neurologist to-here returned 2 wk ago to [place] and explained to him that day he needs to stay one place with the same global doctor to take care of it otherwise the care will be broken up and it will not be good. His dad with him right now to help him set up the care. Social anxiety--saw Dr Rubia Pappas -School--8 y ago--taking xanax and zoloft from Dr Jeanne Pruitt and Zoloft made him drowsy even 50 mg and right now n [facility]  and his father did not want to start any medication. As he has appointment with [facility] [place] [facility] and he is going to see psychiatrist and I did give referral for other psychiatrist too. Whosoever he can be seen to treat his depression anxiety part as well as he is not getting any drug rehab here. Explained to him does not do him good because as per him he did smoke marijuana and with the drug rehab he is not supposed to smoke any drugs or use any drugs and explained to him need to stay quit with that. He does need to start depression anxiety medication as soon as possible.     [facility]--appointment--time    Yesterday BF-  Lunch-snack chips / coffee    Dinner-mac cheese/water    Penis pain--urimart-tamsulosine--saw urgent care 2 wk ago-and no urinary infection and they were going to send for culture but they didn't find anything else.-urologist center 4 years ago--could not find anything --he has not seen urologist here he had seen them few years back and explained for him to see the urologist again and see what they recommend. Abdominal pain with the constipation--EGD--neg-in 2017 and he did not see any gastroenterologist in [place] over last 4 years and he has not done--colonoscopy. On questioning his diet is very poor and he used to have gluten enteropathy and he was supposed to be having gluten-free diet and he does not follow-up with that either and he does drink a lot of coffee and explained to him that does not go either with upper abdominal pain and the GERD and that he needs to change his eating habits and drinking habits and he does need to drink a lot of water or propel or Gatorade and no tea coffee pop or chocolate explained to him at length and avoid any face drinks or any acidic foods or orange etc.    He has leg pains and he has not been diagnosed with neuropathy and he has not seen a neurologist in [place] either and he has been in [place] for last 4 years. He wants to get treated for the leg pains and he does get some back pain at times but more than the leg pain and explained to him that need to find out what is causing the leg pain and then he does need to see the neurologist for the nerve testing in addition to the blood test to find out what the cause of this leg pain and the neuropathy and he has been taking ibuprofen three times a day are not helping and explained to him that we'll try to find out what the cause of the neuropathy and sometimes we do not find a cause of neuropathy and we just treat symptomatically and we can only help neuropathy to certain extent if we do not find any cause otherwise it may be drug abuse related. On the legs he does not have any skin changes. Smoking    The [address] for Disease Control and Prevention states:    Tobacco use harms every organ of the body which leads to disease and disability. Tobacco use causes cancer, heart disease, stroke, and lung diseases (including emphysema, bronchitis, and chronic airway obstruction).      strongly encouraged  to quit using tobacco.   You can decrease your cigarette use by half every 1-2 weeks to quit smoking in 4-8 weeks or so. You can use off and on nicotine gum or lozenges to help quit smoking. Review of Systems   Constitutional: Negative for appetite change, chills, fever and unexpected weight change. HENT: Negative for congestion, ear discharge, ear pain, nosebleeds, rhinorrhea, sinus pressure, sinus pain, sore throat and trouble swallowing. Eyes: Negative for pain and discharge. Respiratory: Negative for cough, chest tightness, shortness of breath and wheezing. Cardiovascular: Negative for chest pain, palpitations and leg swelling. Gastrointestinal: Positive for abdominal pain. Negative for blood in stool, nausea and vomiting. Endocrine: Negative for polydipsia and polyphagia. Genitourinary: Positive for dysuria. Negative for difficulty urinating, enuresis, flank pain and hematuria. Penis pain/ dysuria/ incomplete bladder emptying   Musculoskeletal: Negative for myalgias. Leg pain bilaterally   Skin: Negative for rash. Neurological: Negative for facial asymmetry, weakness, light-headedness, numbness and headaches. Psychiatric/Behavioral: Positive for dysphoric mood. Negative for confusion. The patient is nervous/anxious. Current Outpatient Medications on File Prior to Visit   Medication Sig Dispense Refill    ibuprofen (ADVIL;MOTRIN) 800 MG tablet 3 times daily      Meth-Hyo-M Bl-Na Phos-Ph Sal (URIMAR T PO) Take by mouth 2 times daily      tamsulosin (FLOMAX) 0.4 MG capsule Take 0.4 mg by mouth daily      calcium carbonate (TUMS) 500 MG chewable tablet Take 1 tablet by mouth daily as needed for Heartburn      albuterol sulfate HFA (PROAIR HFA) 108 (90 Base) MCG/ACT inhaler Inhale 2 puffs into the lungs every 6 hours as needed for Wheezing or Shortness of Breath 3 Inhaler 1     No current facility-administered medications on file prior to visit.       Allergies Allergen Reactions    Amoxicillin Hives    Ceclor [Cefaclor]      Joints swelled     Gluten Meal Nausea And Vomiting     fatigue     Past Medical History:   Diagnosis Date    ADD (attention deficit disorder) 11/17/2014    Allergic rhinitis 9/4/2013    Allergy     Asthma 9/4/2013    Biliary dyskinesia     Gluten-induced enteropathy 12/17/2015     Past Surgical History:   Procedure Laterality Date    CHOLECYSTECTOMY, LAPAROSCOPIC  12/22/2017    lap-cristine    UPPER GASTROINTESTINAL ENDOSCOPY      WISDOM TOOTH EXTRACTION        Social History     Tobacco Use    Smoking status: Current Every Day Smoker     Packs/day: 1.00     Years: 6.00     Pack years: 6.00     Types: Cigarettes    Smokeless tobacco: Former User    Tobacco comment: smokes 1-2 packs per day   Substance Use Topics    Alcohol use: No      Family History   Problem Relation Age of Onset    Diabetes Paternal Grandmother     Cancer Maternal Grandfather     High Blood Pressure Maternal Grandfather     High Cholesterol Maternal Grandfather     Heart Disease Maternal Grandfather         Vitals:    11/30/21 0938   BP: 130/70   Site: Left Upper Arm   Position: Sitting   Cuff Size: Medium Adult   Pulse: 88   Temp: 97.7 °F (36.5 °C)   TempSrc: Temporal   SpO2: 98%   Weight: 158 lb 3.2 oz (71.8 kg)   Height: 5' 8\" (1.727 m)     Estimated body mass index is 24.05 kg/m² as calculated from the following:    Height as of this encounter: 5' 8\" (1.727 m). Weight as of this encounter: 158 lb 3.2 oz (71.8 kg). Physical Exam  Vitals and nursing note reviewed. Constitutional:       General: He is not in acute distress. HENT:      Head: Normocephalic and atraumatic.       Right Ear: Tympanic membrane, ear canal and external ear normal.      Left Ear: Tympanic membrane, ear canal and external ear normal.      Nose: Nose normal.      Mouth/Throat:      Mouth: Mucous membranes are moist.   Eyes:      General: Lids are normal.      Conjunctiva/sclera: Conjunctivae normal.      Pupils: Pupils are equal, round, and reactive to light. Neck:      Thyroid: No thyromegaly. Vascular: No JVD. Trachea: No tracheal deviation. Cardiovascular:      Rate and Rhythm: Normal rate and regular rhythm. Heart sounds: Normal heart sounds. No gallop. Pulmonary:      Effort: Pulmonary effort is normal. No respiratory distress. Breath sounds: Normal breath sounds. No wheezing or rales. Abdominal:      General: Bowel sounds are normal.      Palpations: Abdomen is soft. There is no mass. Tenderness: There is no abdominal tenderness. There is no guarding or rebound. Comments:   Vague abdominal discomfort generalized   Musculoskeletal:         General: No tenderness. Cervical back: Neck supple. Comments: No leg edema or calf tenderness   Lymphadenopathy:      Cervical: No cervical adenopathy. Skin:     General: Skin is warm and dry. Findings: No rash. Neurological:      Mental Status: He is alert and oriented to person, place, and time. Cranial Nerves: No cranial nerve deficit. Sensory: No sensory deficit. Comments: Appears to be neuropathy bilateral leg   Psychiatric:         Attention and Perception: Attention and perception normal.         Mood and Affect: Mood is anxious and depressed. Speech: Speech normal.         Behavior: Behavior normal.         Thought Content: Thought content normal.         Cognition and Memory: Cognition and memory normal.         ASSESSMENT/PLAN:  1. Generalized abdominal pain    - AFL - Riki Henson MD, Gastroenterology, [facility]    2. Neuropathy    - CBC Auto Differential; Future  - Comprehensive Metabolic Panel; Future  - T4, Free; Future  - TSH with Reflex; Future  - Sedimentation Rate; Future  - AFL - Bentley Davis MD, Neurology, [facility]  - Folate; Future  - Methylmalonic Acid, Serum; Future    3.  Vitamin D deficiency    - Vitamin D 25 Hydroxy; Future    4. Gluten-induced enteropathy  Gluten-free diet    5. Cannabis abuse  Try to stay away from cannabis and try to wean off nicotine also. 6. Pain in both lower extremities    - Magnesium; Future    7. Incomplete bladder emptying    - AFL - Estrada Tobar MD, Urology, [place]  - Urinalysis Reflex to Culture; Future    8. Current moderate episode of major depressive disorder without prior episode [facility])    - External Referral to Psychiatry    9. Generalized anxiety disorder    - External Referral to Psychiatry    10. Nondependent opioid abuse ([address]  Avoid drugs and join drug rehab center here in the meantime-explained to patient and his father that needs to do the [facility] or [facility] drug rehab center. If he is going to be needing pain medication or chronic pain management then he cannot do the drug rehab center so he needs to decide what he wants to do. Return in about 1 week (around 12/7/2021) for lab f/u. Patient Instructions   See Drug rehab center here. [facility]    Blood test right now. Increase fruit and vegetable and 30 g of fiber a day at least with oatmeal or fiber 1 cereal    Drinking 64 ounce water from 8 AM to 6 PM or whenever he is awake in the daytime. See the gastroenterologist.    Gluten free diet. See the psychiatrist as soon as possible    See the urologist    Limit coffee to 1 to 2 cups a day only after meal.    He can drink propel or Gatorade or vitamin water           Electronically signed by Marily Espinoza MD on 11/30/2021 at 1:05 PM     This dictation was generated by voice recognition computer software. Although all attempts are made to edit the dictation for accuracy, there may be errors in the transcription that are not intended.

## 2021-12-01 ENCOUNTER — TELEPHONE (OUTPATIENT)
Dept: INTERNAL MEDICINE CLINIC | Age: 26
End: 2021-12-01

## 2021-12-01 DIAGNOSIS — R73.9 HYPERGLYCEMIA: Primary | ICD-10-CM

## 2021-12-01 NOTE — TELEPHONE ENCOUNTER
Lona from [facility] lab called in to inform Dr. Josué Dasilva that the Methylmalonic Acid, Serum was rejected due to specimen handling. Pls advise.

## 2021-12-01 NOTE — TELEPHONE ENCOUNTER
Please call patient to redraw for this methylmalonic acid and also do hemoglobin A1c to rule out diabetes for neuropathy

## 2021-12-02 ENCOUNTER — PATIENT MESSAGE (OUTPATIENT)
Dept: INTERNAL MEDICINE CLINIC | Age: 26
End: 2021-12-02

## 2021-12-02 ENCOUNTER — TELEPHONE (OUTPATIENT)
Dept: INTERNAL MEDICINE CLINIC | Age: 26
End: 2021-12-02

## 2021-12-02 NOTE — TELEPHONE ENCOUNTER
Elgin Phillips MD 35 minutes ago (9:25 AM)           Hello, I went to the Neurologists, Dr. Demetrius Soulier,  your team suggested.    He suggested taking Cymbalta, a low dose.     What are your thoughts about this drug?     Could it be an issue with my addiction issues?     The pain in my legs and penis is still present.     Tino

## 2021-12-02 NOTE — TELEPHONE ENCOUNTER
From: Arabella López  To: Dr. Petra Still: 12/2/2021 9:25 AM EST  Subject: THoughts on Cymbalta for me? Hello, I went to the Neurologists, Dr. Ghazal Clark, your team suggested. He suggested taking Cymbalta, a low dose. What are your thoughts about this drug? Could it be an issue with my addiction issues? The pain in my legs and penis is still present.     Annabella Hinds

## 2021-12-09 ENCOUNTER — PATIENT MESSAGE (OUTPATIENT)
Dept: INTERNAL MEDICINE CLINIC | Age: 26
End: 2021-12-09

## 2021-12-09 NOTE — TELEPHONE ENCOUNTER
From: Thaddeus Cohn  To: Dr. Kameron Coppola: 12/9/2021 8:14 AM EST  Subject: 1:20 appointment    Hello, I cannot make the appointment today. .. I will need to reschedule.     Jose Garcia

## 2021-12-17 ENCOUNTER — OFFICE VISIT (OUTPATIENT)
Dept: INTERNAL MEDICINE CLINIC | Age: 26
End: 2021-12-17
Payer: MEDICARE

## 2021-12-17 VITALS
TEMPERATURE: 98 F | OXYGEN SATURATION: 97 % | HEART RATE: 95 BPM | DIASTOLIC BLOOD PRESSURE: 70 MMHG | HEIGHT: 68 IN | SYSTOLIC BLOOD PRESSURE: 110 MMHG | BODY MASS INDEX: 23.76 KG/M2 | WEIGHT: 156.8 LBS

## 2021-12-17 DIAGNOSIS — E55.9 VITAMIN D DEFICIENCY: ICD-10-CM

## 2021-12-17 DIAGNOSIS — J45.30 REACTIVE AIRWAY DISEASE, MILD PERSISTENT, UNCOMPLICATED: ICD-10-CM

## 2021-12-17 DIAGNOSIS — G62.9 NEUROPATHY: Primary | ICD-10-CM

## 2021-12-17 DIAGNOSIS — J45.20 MILD INTERMITTENT ASTHMA WITHOUT COMPLICATION: ICD-10-CM

## 2021-12-17 DIAGNOSIS — F17.219 CIGARETTE NICOTINE DEPENDENCE WITH NICOTINE-INDUCED DISORDER: ICD-10-CM

## 2021-12-17 PROCEDURE — G8420 CALC BMI NORM PARAMETERS: HCPCS | Performed by: INTERNAL MEDICINE

## 2021-12-17 PROCEDURE — 99214 OFFICE O/P EST MOD 30 MIN: CPT | Performed by: INTERNAL MEDICINE

## 2021-12-17 PROCEDURE — 4004F PT TOBACCO SCREEN RCVD TLK: CPT | Performed by: INTERNAL MEDICINE

## 2021-12-17 PROCEDURE — G8484 FLU IMMUNIZE NO ADMIN: HCPCS | Performed by: INTERNAL MEDICINE

## 2021-12-17 PROCEDURE — G8427 DOCREV CUR MEDS BY ELIG CLIN: HCPCS | Performed by: INTERNAL MEDICINE

## 2021-12-17 RX ORDER — CHOLECALCIFEROL (VITAMIN D3) 125 MCG
1 CAPSULE ORAL DAILY
Qty: 30 TABLET | Refills: 5 | Status: SHIPPED | OUTPATIENT
Start: 2021-12-17 | End: 2022-05-24

## 2021-12-17 RX ORDER — ALBUTEROL SULFATE 90 UG/1
2 AEROSOL, METERED RESPIRATORY (INHALATION) EVERY 6 HOURS PRN
Qty: 1 EACH | Refills: 2 | Status: ON HOLD | OUTPATIENT
Start: 2021-12-17 | End: 2022-09-12

## 2021-12-17 RX ORDER — DULOXETIN HYDROCHLORIDE 20 MG/1
CAPSULE, DELAYED RELEASE ORAL EVERY MORNING
COMMUNITY
Start: 2021-12-02 | End: 2022-05-24

## 2021-12-17 RX ORDER — METHENAMINE, SODIUM PHOSPHATE, MONOBASIC, MONOHYDRATE, PHENYL SALICYLATE, METHYLENE BLUE, AND HYOSCYAMINE SULFATE 120; 40.8; 36.2; 10.8; .12 MG/1; MG/1; MG/1; MG/1; MG/1
TABLET ORAL 2 TIMES DAILY
COMMUNITY
Start: 2021-12-07 | End: 2022-03-21

## 2021-12-17 ASSESSMENT — ENCOUNTER SYMPTOMS
SORE THROAT: 0
BLOOD IN STOOL: 0
NAUSEA: 0
RHINORRHEA: 0
CHEST TIGHTNESS: 0
TROUBLE SWALLOWING: 0
SHORTNESS OF BREATH: 0
COUGH: 0
WHEEZING: 1
SINUS PAIN: 0
EYE DISCHARGE: 0
VOMITING: 0
EYE PAIN: 0
ABDOMINAL PAIN: 0
SINUS PRESSURE: 0

## 2021-12-17 NOTE — PROGRESS NOTES
2021     Tamara Vargas (: 1995) is a 32 y.o. male, here for evaluation of the following medical concerns:    Chief Complaint   Patient presents with   [facility]-not one appt--called others--no appt--    He has not started an exercise program and explained to him unless he starts exercise program mental status not going to improve. He does not do any meditation and explained to him that use the [employer] stef to do meditation and see how that helps. Explained to him) the diet that he needs to change his friends network because friends are the one bring the drugs and get him hooked on again and does not do good for him. Saw a neurologist--cymbalta and MRI and EMG to be scheduled --- approved    Orders Only on 2021--all labs explained at length in presence her dad and he was supposed to do hemoglobin A1c and vitamin B12 but he has not done that test and he does need to do those. Different urine test with ketones and sometimes anion gap in the blood test and explained to father concerned that that happens with the depending on starvation or different kind of fluids you consume and that is what causes it different anion gap and he has been drinking less coffee but not much water explained to him does not going to help him. He does need to consume 64 ounce water from 10 AM to 8 PM.    Folate                                        Date: 2021  Value: 5.35        Ref range: 4.78 - 24.20 ng/*  Status: Final                Comment: Effective 11-15-16 10:00am EST  Please note reference ranges have  changed for Folate.     Color, UA                                     Date: 2021  Value: GREEN*      Ref range: Straw/Yellow       Status: Final  Clarity, UA                                   Date: 2021  Value: Clear       Ref range: Clear              Status: Final  Glucose, Ur                                   Date: 2021  Value: Negative    Ref range: Negative mg/dL     Status: Final  Bilirubin Urine                               Date: 11/30/2021  Value: Negative    Ref range: Negative           Status: Final  Ketones, Urine                                Date: 11/30/2021  Value: Negative    Ref range: Negative mg/dL     Status: Final  Specific [place], UA                          Date: 11/30/2021  Value: 1.027       Ref range: 1.005 - 1.030      Status: Final  Blood, Urine                                  Date: 11/30/2021  Value: Negative    Ref range: Negative           Status: Final  pH, UA                                        Date: 11/30/2021  Value: 6.5         Ref range: 5.0 - 8.0          Status: Final  Protein, UA                                   Date: 11/30/2021  Value: Negative    Ref range: Negative mg/dL     Status: Final  Urobilinogen, Urine                           Date: 11/30/2021  Value: 0.2         Ref range: <2.0 E.U./dL       Status: Final  Nitrite, Urine                                Date: 11/30/2021  Value: Negative    Ref range: Negative           Status: Final  Leukocyte Esterase, Urine                     Date: 11/30/2021  Value: Negative    Ref range: Negative           Status: Final  Microscopic Examination                       Date: 11/30/2021  Value: Not Indicated                       Status: Final  Urine Type                                    Date: 11/30/2021  Value: Cleancatch    Status: Final                Comment: Urine received in a tube containing preservative. This preservative will not effect the physical  characteristics of the urine.     Urine Reflex to Culture                       Date: 11/30/2021  Value: Not Indicated                       Status: Final  Magnesium                                     Date: 11/30/2021  Value: 2.00        Ref range: 1.80 - 2.40 mg/dL  Status: Final  Vit D, 25-Hydroxy                             Date: 11/30/2021  Value: 28.4*       Ref range: >=30 ng/mL         Status: Final Comment: <=20 ng/mL. ........... Ellen Fanning Deficient  21-29 ng/mL. ......... Ellen Fanning Insufficient  >=30 ng/mL. ........ Ellen Fanning Sufficient    Sed Rate                                      Date: 11/30/2021  Value: 2           Ref range: 0 - 15 mm/Hr       Status: Final  TSH                                           Date: 11/30/2021  Value: 0.74        Ref range: 0.27 - 4.20 uIU/*  Status: Final  T4 Free                                       Date: 11/30/2021  Value: 1.0         Ref range: 0.9 - 1.8 ng/dL    Status: Final  Sodium                                        Date: 11/30/2021  Value: 142         Ref range: 136 - 145 mmol/L   Status: Final  Potassium                                     Date: 11/30/2021  Value: 5.1         Ref range: 3.5 - 5.1 mmol/L   Status: Final  Chloride                                      Date: 11/30/2021  Value: 106         Ref range: 99 - 110 mmol/L    Status: Final  CO2                                           Date: 11/30/2021  Value: 23          Ref range: 21 - 32 mmol/L     Status: Final  Anion Gap                                     Date: 11/30/2021  Value: 13          Ref range: 3 - 16             Status: Final  Glucose                                       Date: 11/30/2021  Value: 84          Ref range: 70 - 99 mg/dL      Status: Final  BUN                                           Date: 11/30/2021  Value: 18          Ref range: 7 - 20 mg/dL       Status: Final  CREATININE                                    Date: 11/30/2021  Value: 0.8*        Ref range: 0.9 - 1.3 mg/dL    Status: Final  GFR Non-                      Date: 11/30/2021  Value: >60         Ref range: >60                Status: Final                Comment: >60 mL/min/1.73m2 EGFR, calc. for ages 25 and older using the  MDRD formula (not corrected for weight), is valid for stable  renal function.     GFR                           Date: 11/30/2021  Value: >60         Ref range: >60                Status: Final                Comment: Chronic Kidney Disease: less than 60 ml/min/1.73 sq.m. Kidney Failure: less than 15 ml/min/1.73 sq.m. Results valid for patients 18 years and older.     Calcium                                       Date: 11/30/2021  Value: 9.6         Ref range: 8.3 - 10.6 mg/dL   Status: Final  Total Protein                                 Date: 11/30/2021  Value: 6.5         Ref range: 6.4 - 8.2 g/dL     Status: Final  Albumin                                       Date: 11/30/2021  Value: 4.7         Ref range: 3.4 - 5.0 g/dL     Status: Final  Albumin/Globulin Ratio                        Date: 11/30/2021  Value: 2.6*        Ref range: 1.1 - 2.2          Status: Final  Total Bilirubin                               Date: 11/30/2021  Value: <0.2        Ref range: 0.0 - 1.0 mg/dL    Status: Final  Alkaline Phosphatase                          Date: 11/30/2021  Value: 43          Ref range: 40 - 129 U/L       Status: Final  ALT                                           Date: 11/30/2021  Value: 12          Ref range: 10 - 40 U/L        Status: Final  AST                                           Date: 11/30/2021  Value: 11*         Ref range: 15 - 37 U/L        Status: Final  WBC                                           Date: 11/30/2021  Value: 6.8         Ref range: 4.0 - 11.0 K/uL    Status: Final  RBC                                           Date: 11/30/2021  Value: 5.06        Ref range: 4.20 - 5.90 M/uL   Status: Final  Hemoglobin                                    Date: 11/30/2021  Value: 15.6        Ref range: 13.5 - 17.5 g/dL   Status: Final  Hematocrit                                    Date: 11/30/2021  Value: 46.1        Ref range: 40.5 - 52.5 %      Status: Final  MCV                                           Date: 11/30/2021  Value: 91.2        Ref range: 80.0 - 100.0 fL    Status: Final  MCH                                           Date: 11/30/2021  Value: 30.7        Ref range: 26.0 - 34.0 pg     Status: Final  MCHC                                          Date: 11/30/2021  Value: 33.7        Ref range: 31.0 - 36.0 g/dL   Status: Final  RDW                                           Date: 11/30/2021  Value: 12.8        Ref range: 12.4 - 15.4 %      Status: Final  Platelets                                     Date: 11/30/2021  Value: 271         Ref range: 135 - 450 K/uL     Status: Final  MPV                                           Date: 11/30/2021  Value: 7.9         Ref range: 5.0 - 10.5 fL      Status: Final  Neutrophils %                                 Date: 11/30/2021  Value: 57.0        Ref range: %                  Status: Final  Lymphocytes %                                 Date: 11/30/2021  Value: 23.9        Ref range: %                  Status: Final  Monocytes %                                   Date: 11/30/2021  Value: 10.1        Ref range: %                  Status: Final  Eosinophils %                                 Date: 11/30/2021  Value: 8.1         Ref range: %                  Status: Final  Basophils %                                   Date: 11/30/2021  Value: 0.9         Ref range: %                  Status: Final  Neutrophils Absolute                          Date: 11/30/2021  Value: 3.9         Ref range: 1.7 - 7.7 K/uL     Status: Final  Lymphocytes Absolute                          Date: 11/30/2021  Value: 1.6         Ref range: 1.0 - 5.1 K/uL     Status: Final  Monocytes Absolute                            Date: 11/30/2021  Value: 0.7         Ref range: 0.0 - 1.3 K/uL     Status: Final  Eosinophils Absolute                          Date: 11/30/2021  Value: 0.5         Ref range: 0.0 - 0.6 K/uL     Status: Final  Basophils Absolute                            Date: 11/30/2021  Value: 0.1         Ref range: 0.0 - 0.2 K/uL     Status: Final  Rejected Test                                 Date: 11/30/2021  Value: 99,431        Status: Final  Reason for Rejection Date: 11/30/2021  Value: see below     Status: Final                Comment: Unable to perform testing; specimen special handling  requirements not followed. To perform testing the  specimen will need to be recollected. Sp handle    ------------  Smoking    The [address] for Disease Control and Prevention states: He is getting some wheezing with asthma and he needs more albuterol as he is still smoking pack a day and explained to him that he does need to quit to avoid permanent damage and we cannot reverse any damaged. Tobacco use harms every organ of the body which leads to disease and disability. Tobacco use causes cancer, heart disease, stroke, and lung diseases (including emphysema, bronchitis, and chronic airway obstruction). strongly encouraged  to quit using tobacco.   You can decrease your cigarette use by half every 1-2 weeks to quit smoking in 4-8 weeks or so. You can use off and on nicotine gum or lozenges to help quit smoking. Urology cysto-Normal-June 2021 also and I reviewed cystoscopy from January 2018 with family and that was completely normal to with prostate normal size are small and explained to her him and his dad may be his 3rd nerves causing him to tighten up the urethra and not able to pee so relaxation might help and maybe the psychiatrist medication can help. Review of Systems   Constitutional: Negative for appetite change, chills, fever and unexpected weight change. HENT: Negative for congestion, ear discharge, ear pain, nosebleeds, rhinorrhea, sinus pressure, sinus pain, sore throat and trouble swallowing. Eyes: Negative for pain and discharge. Respiratory: Positive for wheezing (  Occasional). Negative for cough, chest tightness and shortness of breath. Cardiovascular: Negative for chest pain, palpitations and leg swelling. Gastrointestinal: Negative for abdominal pain, blood in stool, nausea and vomiting.    Endocrine: Negative for polydipsia and polyphagia. Genitourinary: Negative for difficulty urinating, enuresis, flank pain and hematuria. Musculoskeletal: Negative for myalgias. Skin: Negative for rash. Neurological: Positive for numbness (  Neuropathic pain legs). Negative for facial asymmetry, weakness, light-headedness and headaches. Psychiatric/Behavioral: Negative for confusion. The patient is nervous/anxious. Current Outpatient Medications on File Prior to Visit   Medication Sig Dispense Refill    DULoxetine (CYMBALTA) 20 MG extended release capsule every morning      Meth-Hyo-M Bl-Na Phos-Ph Sal (URIMAR-T) 120 MG TABS 2 times daily      Cyclobenzaprine HCl (FLEXERIL PO) Take by mouth daily      ibuprofen (ADVIL;MOTRIN) 800 MG tablet 3 times daily      Meth-Hyo-M Bl-Na Phos-Ph Sal (URIMAR T PO) Take by mouth 2 times daily      tamsulosin (FLOMAX) 0.4 MG capsule Take 0.4 mg by mouth daily      calcium carbonate (TUMS) 500 MG chewable tablet Take 1 tablet by mouth daily as needed for Heartburn       No current facility-administered medications on file prior to visit.       Past Medical History:   Diagnosis Date    ADD (attention deficit disorder) 11/17/2014    Allergic rhinitis 9/4/2013    Allergy     Asthma 9/4/2013    Biliary dyskinesia     Gluten-induced enteropathy 12/17/2015      Social History     Tobacco Use    Smoking status: Current Every Day Smoker     Packs/day: 1.00     Years: 6.00     Pack years: 6.00     Types: Cigarettes    Smokeless tobacco: Former User    Tobacco comment: smokes 1-2 packs per day   Substance Use Topics    Alcohol use: No      Family History   Problem Relation Age of Onset    Diabetes Paternal Grandmother     Cancer Maternal Grandfather     High Blood Pressure Maternal Grandfather     High Cholesterol Maternal Grandfather     Heart Disease Maternal Grandfather         Vitals:    12/17/21 1645   BP: 110/70   Pulse: 95   Temp: 98 °F (36.7 °C)   TempSrc: Temporal SpO2: 97%   Weight: 156 lb 12.8 oz (71.1 kg)   Height: 5' 8\" (1.727 m)     Estimated body mass index is 23.84 kg/m² as calculated from the following:    Height as of this encounter: 5' 8\" (1.727 m). Weight as of this encounter: 156 lb 12.8 oz (71.1 kg). Physical Exam  Vitals and nursing note reviewed. Constitutional:       General: He is not in acute distress. HENT:      Head: Normocephalic and atraumatic. Right Ear: Tympanic membrane, ear canal and external ear normal.      Left Ear: Tympanic membrane, ear canal and external ear normal.      Nose: Nose normal.   Eyes:      General: Lids are normal.      Conjunctiva/sclera: Conjunctivae normal.      Pupils: Pupils are equal, round, and reactive to light. Neck:      Thyroid: No thyromegaly. Vascular: No JVD. Trachea: No tracheal deviation. Cardiovascular:      Rate and Rhythm: Normal rate and regular rhythm. Heart sounds: Normal heart sounds. No gallop. Pulmonary:      Effort: Pulmonary effort is normal. No respiratory distress. Breath sounds: Normal breath sounds. No wheezing or rales. Abdominal:      General: Bowel sounds are normal.      Palpations: Abdomen is soft. There is no mass. Tenderness: There is no abdominal tenderness. Musculoskeletal:         General: No tenderness. Cervical back: Neck supple. Comments: No leg edema or calf tenderness   Lymphadenopathy:      Cervical: No cervical adenopathy. Skin:     General: Skin is warm and dry. Findings: No rash. Neurological:      Mental Status: He is alert and oriented to person, place, and time. Cranial Nerves: No cranial nerve deficit. Sensory: No sensory deficit. Comments: Neuropathic pain legs   Psychiatric:         Mood and Affect: Mood is anxious. Behavior: Behavior normal.         Thought Content: Thought content normal.         ASSESSMENT/PLAN:  1. Neuropathy  Keep neurologist follow-up    2.  Vitamin D deficiency    - Cholecalciferol (VITAMIN D3) 50 MCG (2000 UT) TABS; Take 1 tablet by mouth daily  Dispense: 30 tablet; Refill: 5    3. Mild intermittent asthma without complication  See the pulmonologist to see and assess pulmonary damage  - Farzana Albarran MD, Pulmonary, [place]    4. Cigarette nicotine dependence with nicotine-induced disorder  Wean off cigarettes    5. Reactive airway disease, mild persistent, uncomplicated    - albuterol sulfate HFA (PROAIR HFA) 108 (90 Base) MCG/ACT inhaler; Inhale 2 puffs into the lungs every 6 hours as needed for Wheezing or Shortness of Breath  Dispense: 1 each; Refill: 2      Return if symptoms worsen or fail to improve. Patient Instructions   Vit b12 and hemoglobin A1c  Tomorrow,    Need urologist records [place]. Keep neurologist f/u. Wean off caffeine. Regular exercise program--one hour a day.--4.00pm    Headspace stef Meditation    See psychiatrist.    2 good meals a day. 64 oz water 10 am to 8 pm.    [place] or [facility]. Strongly encouraged to quit using tobacco.   You can decrease your cigarette use by half every 1-2 weeks to quit smoking in 4-8 weeks or so. You can use off and on nicotine gum or lozenges to help quit smoking. The [address] for Disease Control and Prevention states:    Tobacco use harms every organ of the body which leads to disease and disability.  Tobacco use causes cancer, heart disease, stroke, and lung diseases (including emphysema, bronchitis, and chronic airway obstruction). We have many other ways to help you quit using tobacco.     We suggest this website:  www.smokefree. gov   You might also like this cell phone text message program.  Text message charges apply on your cell phone plan. Text the word QUIT to IQUIT to get started.  This program offers free telephone counseling.   Dial 1-800-QUIT-Now                   Electronically signed by Dayna Sharpe MD on 12/17/2021 at 6:30 PM     This dictation was generated by voice recognition computer software. Although all attempts are made to edit the dictation for accuracy, there may be errors in the transcription that are not intended.

## 2021-12-17 NOTE — PATIENT INSTRUCTIONS
Vit b12 and hemoglobin A1c  Tomorrow,    Need urologist records [place]. Keep neurologist f/u. Wean off caffeine. Regular exercise program--one hour a day.--4.00pm    Headspace stef Meditation    See psychiatrist.    2 good meals a day. 64 oz water 10 am to 8 pm.    [place] or [facility]. Strongly encouraged to quit using tobacco.   You can decrease your cigarette use by half every 1-2 weeks to quit smoking in 4-8 weeks or so. You can use off and on nicotine gum or lozenges to help quit smoking. The [address] for Disease Control and Prevention states:    Tobacco use harms every organ of the body which leads to disease and disability.  Tobacco use causes cancer, heart disease, stroke, and lung diseases (including emphysema, bronchitis, and chronic airway obstruction). We have many other ways to help you quit using tobacco.     We suggest this website:  www.smokefree. gov   You might also like this cell phone text message program.  Text message charges apply on your cell phone plan. Text the word QUIT to DOMENICOUIT to get started.  This program offers free telephone counseling.   Dial 0-265-QUIT-Now

## 2022-01-31 ENCOUNTER — HOSPITAL ENCOUNTER (EMERGENCY)
Age: 27
Discharge: LEFT BEFORE DISCHARGE | End: 2022-01-31
Attending: EMERGENCY MEDICINE
Payer: MEDICARE

## 2022-01-31 VITALS
SYSTOLIC BLOOD PRESSURE: 137 MMHG | WEIGHT: 150 LBS | BODY MASS INDEX: 22.73 KG/M2 | DIASTOLIC BLOOD PRESSURE: 88 MMHG | OXYGEN SATURATION: 97 % | RESPIRATION RATE: 16 BRPM | HEART RATE: 83 BPM | HEIGHT: 68 IN | TEMPERATURE: 97.5 F

## 2022-01-31 DIAGNOSIS — G89.29 OTHER CHRONIC PAIN: ICD-10-CM

## 2022-01-31 DIAGNOSIS — G62.9 NEUROPATHY: Primary | ICD-10-CM

## 2022-01-31 PROCEDURE — 99254 IP/OBS CNSLTJ NEW/EST MOD 60: CPT | Performed by: NURSE PRACTITIONER

## 2022-01-31 PROCEDURE — 99283 EMERGENCY DEPT VISIT LOW MDM: CPT

## 2022-01-31 ASSESSMENT — PAIN SCALES - GENERAL: PAINLEVEL_OUTOF10: 9

## 2022-01-31 ASSESSMENT — PAIN DESCRIPTION - FREQUENCY: FREQUENCY: CONTINUOUS

## 2022-01-31 ASSESSMENT — PAIN DESCRIPTION - DESCRIPTORS: DESCRIPTORS: SHOOTING;SHARP;TINGLING

## 2022-01-31 ASSESSMENT — PAIN DESCRIPTION - PAIN TYPE: TYPE: ACUTE PAIN

## 2022-01-31 NOTE — ED PROVIDER NOTES
[address] [facility]          ATTENDING PHYSICIAN NOTE       Date of evaluation: 1/31/2022    Chief Complaint     Arm Pain (\"im having a lot of pain in my arms legs and penis. \" states he has been ahaving this pain for 4 years, is seeing a neurologist for it but pain was too bad. ), Leg Pain, and Penis Pain      History of Present Illness     Ching Zaldivar is a 32 y.o. male with history of chronic pain in his extremities x4 as well as chronic penis pain presenting for exacerbation of these pains. Patient states that he has had approximately 4 years of these pains and has recently seen a neurologist who started him on Cymbalta. He has been taking this medication for about 1 month without significant improvement. Today the pains were severe upon awakening and thus he presented to the emergency department for further evaluation. He does have a history of polysubstance abuse, denies any ongoing drug use at this time. Has an outpatient MRI and EMG scheduled which has not yet occurred. He has been taking Tylenol in addition to the Cymbalta without relief. Denies fevers, weakness, and difficulty with gait, coordination, or balance. No vision changes. No headache. Has been evaluated by a urologist for his penile pain multiple times without any cause identified. Denies any new sexual partners or concerns for STIs. Review of Systems     Pertinent positive and negative findings as documented in the HPI. Otherwise all other systems were reviewed and were negative. Physical Exam     INITIAL VITALS: BP: 137/88, Temp: 97.5 °F (36.4 °C), Pulse: 83, Resp: 16, SpO2: 97 %     Nursing note and vitals reviewed. General:  Adult male, alert and appropriately interactive. In no distress. HENT: Normocephalic and atraumatic. External ears normal. Nose appears normal externally. Eyes: Conjunctivae normal. No scleral icterus. PERRL  Neck: Neck supple. No tracheal deviation present. CV: Normal rate. Regular rhythm. S1/S2 auscultated. No murmurs, gallops or rubs. Pulm: Effort normal. Breath sounds clear to auscultation bilaterally. No wheezes. No rales or rhonchi. GI: Soft. No distension. Mild tenderness. No rebound or guarding. No masses. No peritoneal signs. Musculoskeletal: No edema. No gross deformities. Neurological: Alert and appropriately interactive. Face symmetric, speech without dysarthria or obvious aphasia. Moving all extremities spontaneously. Sensation grossly intact in all extremities. Variable hyperesthesia throughout the distal extremities x4. No dermatomal radial, ulnar, median nerves distribution in the hand. No dermatomal distribution in the lower extremities. Skin: Warm, dry. No rash. No diaphoresis or erythema. Procedures   Procedures    MEDICAL DECISION MAKING     MDM: Lalit Harrington is a 32 y.o. male with history as above presenting to the emergency department today for acute on chronic pain in all of his extremities as well as his penis. On arrival, patient is in no distress and vital signs are reassuring. On my examination he has no focal neurologic findings but is hyperesthetic throughout multiple parts of his extremities. The exact etiology of his symptoms are unclear, but does not appear to represent an emergent threat to life or limb given the chronicity. He is currently followed by neurology and given his history of polysubstance abuse, do not feel that simply adding narcotics or even neuropathic pain meds are necessarily in his best interest due to their addictive qualities. I did discuss that he may need additional time for the Cymbalta to take full effect, and that we are unlikely to be able to provide any medications that would rapidly change the course of his pain. He is repeatedly asking for pain medications that can fix his pain today.   I did offer to speak with his neurologist for additional recommendations about Cymbalta dosing or alternative regimens. While awaiting a return call, the patient eloped from the emergency department. He did not have an IV in place. He did not receive after visit summary paperwork. Clinical Impression     1. Neuropathy    2. Other chronic pain        Disposition     DISPOSITION Eloped - Left Before Treatment Complete 01/31/2022 04:14:20 PM        Vani Venegas MD  4:14 PM                     Past Medical, Surgical, Family, and Social History     He has a past medical history of ADD (attention deficit disorder), Allergic rhinitis, Allergy, Asthma, Biliary dyskinesia, and Gluten-induced enteropathy. He has a past surgical history that includes [place] tooth extraction; Upper gastrointestinal endoscopy; and Cholecystectomy, laparoscopic (12/22/2017). His family history includes Cancer in his maternal grandfather; Diabetes in his paternal grandmother; Heart Disease in his maternal grandfather; High Blood Pressure in his maternal grandfather; High Cholesterol in his maternal grandfather. He reports that he has been smoking cigarettes. He has a 6.00 pack-year smoking history. He has quit using smokeless tobacco. He reports current drug use. Drug[facility] He reports that he does not drink alcohol.     Medications     Previous Medications    ALBUTEROL SULFATE HFA (PROAIR HFA) 108 (90 BASE) MCG/ACT INHALER    Inhale 2 puffs into the lungs every 6 hours as needed for Wheezing or Shortness of Breath    CALCIUM CARBONATE (TUMS) 500 MG CHEWABLE TABLET    Take 1 tablet by mouth daily as needed for Heartburn    CHOLECALCIFEROL (VITAMIN D3) 50 MCG (2000 UT) TABS    Take 1 tablet by mouth daily    CYCLOBENZAPRINE HCL (FLEXERIL PO)    Take by mouth daily    DULOXETINE (CYMBALTA) 20 MG EXTENDED RELEASE CAPSULE    every morning    IBUPROFEN (ADVIL;MOTRIN) 800 MG TABLET    3 times daily    METH-HYO-M BL-NA PHOS-PH SAL (URIMAR T PO)    Take by mouth 2 times daily    METH-HYO-M BL-NA PHOS-PH SAL (URIMAR-T) 120 MG TABS    2 times daily    TAMSULOSIN (FLOMAX) 0.4 MG CAPSULE    Take 0.4 mg by mouth daily       Allergies     He is allergic to amoxicillin, ceclor [cefaclor], and gluten meal.    ED Course     Nursing Notes, Past Medical Hx, Past Surgical Hx, Social Hx,Allergies, and Family Hx were reviewed. Patient was given the following medications:  No orders of the defined types were placed in this encounter. Diagnostic Results       RECENT VITALS:  BP: 137/88,Temp: 97.5 °F (36.4 °C), Pulse: 83, Resp: 16, SpO2: 97 %     RADIOLOGY:  No orders to display       LABS:   No results found for this visit on 01/31/22.     CONSULTS:  IP CONSULT TO NEUROLOGY    PATIENT REFERRED TO:  Concha Valerio, [address] [place] [address]  [phone]    Schedule an appointment as soon as possible for a visit in 1 week  For reexamination    Brenda Cameron, [address] [place] [id number] [phone]    Call today  To schedule an appointment for reexamination and to discuss changes to medications      DISCHARGE MEDICATIONS:  New Prescriptions    No medications on file          Aman Del Real MD  01/31/22 3439

## 2022-01-31 NOTE — ED NOTES
Left after seeing neurology, left prior to discharge order and prior to dispo from Dr Marlon Rome, RN  01/31/22 8853

## 2022-01-31 NOTE — CONSULTS
Neurology / Jes Cummings Note    Daniel Sawant MD is requesting this consult. Reason for Consult: neuropathic pain  Admission Chief Complaint: \"I'm having a lot of pain in my legs and arms\"    History of Present Illness     Ching Zaldivar is a 32 y.o. y/o male who presents with pain in his arms and legs. He tells me this started four years ago. He sees a neurologist for this. He tells me it is progressively worsening. He tells me he \"just couldn't take it anymore. \" He doesn't take any medicine for this. He isn't sure what he has had done in regard to a neurologic work up. He denies any change in the quality of his pain, just that the severity is worse. He denies any new associated symptoms - no weakness, no diplopia, no dysphagia or aphasia. He was reluctant to take any pain medication but it is finally to the point he really wants medication for it. He then tells me home medications include Cymbalta and flexeril for nerve pain. He doesn't know why he has this pain. He tells me the pain starts in the penis, goes down the legs, into his hands, and up to his head. He reports it is a stinging, sharp pain. He reports his pain is constant. He reports nothing makes it better and really nothing makes it worse. He denies illicit drug use. He is constantly belching throughout our interaction but reports this is baseline for him. REVIEW OF SYSTEMS:   Constitutional- No weight loss or fevers   Eyes- No diplopia. + photophobia. Ears/nose/throat- No dysphagia. No Dysarthria   Cardiovascular- No palpitations. No chest pain   Respiratory- No dyspnea. No Cough   Gastrointestinal- + Abdominal pain. + Vomiting. Genitourinary- + incontinence. + urinary retention   Musculoskeletal- + myalgia. + arthralgia   Skin- No rash. No easy bruising. Psychiatric- + depression. + anxiety   Endocrine- No diabetes. No thyroid issues. Hematologic- No bleeding difficulty.  No fatigue   Neurologic- no weakness; no headache    Past Medical, Surgical, Family, and Social History   PAST MEDICAL HISTORY:  Past Medical History:   Diagnosis Date    ADD (attention deficit disorder) 11/17/2014    Allergic rhinitis 9/4/2013    Allergy     Asthma 9/4/2013    Biliary dyskinesia     Gluten-induced enteropathy 12/17/2015     SURGICAL HISTORY:  Past Surgical History:   Procedure Laterality Date    CHOLECYSTECTOMY, LAPAROSCOPIC  12/22/2017    lap-cristine    UPPER GASTROINTESTINAL ENDOSCOPY      WISDOM TOOTH EXTRACTION       FAMILY HISTORY & SOCIAL HISTORY:  Family history non-contributory  Family History   Problem Relation Age of Onset    Diabetes Paternal Grandmother     Cancer Maternal Grandfather     High Blood Pressure Maternal Grandfather     High Cholesterol Maternal Grandfather     Heart Disease Maternal Grandfather      Social History     Tobacco History     Smoking Status  Current Every Day Smoker Smoking Frequency  1 pack/day for 6 years (6 pk yrs) Smoking Tobacco Type  Cigarettes    Smokeless Tobacco Use  Former User    Tobacco Comment  smokes 1-2 packs per day          Alcohol History     Alcohol Use Status  No          Drug Use     Drug Use Status  Yes Types  Marijuana (Alejandra Madhav) Comment  unable to keep going to rehab due to muscle pain, unable to complete program          Sexual Activity     Sexually Active  Not Asked              Allergies & Outpatient Medications   ALLERGIES:  Allergies   Allergen Reactions    Amoxicillin Hives    Ceclor [Cefaclor]      Joints swelled     Gluten Meal Nausea And Vomiting     fatigue     HOME MEDICATIONS:  Patient's Medications   New Prescriptions    No medications on file   Previous Medications    ALBUTEROL SULFATE HFA (PROAIR HFA) 108 (90 BASE) MCG/ACT INHALER    Inhale 2 puffs into the lungs every 6 hours as needed for Wheezing or Shortness of Breath    CALCIUM CARBONATE (TUMS) 500 MG CHEWABLE TABLET    Take 1 tablet by mouth daily as needed for Heartburn CHOLECALCIFEROL (VITAMIN D3) 50 MCG (2000 UT) TABS    Take 1 tablet by mouth daily    CYCLOBENZAPRINE HCL (FLEXERIL PO)    Take by mouth daily    DULOXETINE (CYMBALTA) 20 MG EXTENDED RELEASE CAPSULE    every morning    IBUPROFEN (ADVIL;MOTRIN) 800 MG TABLET    3 times daily    METH-HYO-M BL-NA PHOS-PH SAL (URIMAR T PO)    Take by mouth 2 times daily    METH-HYO-M BL-NA PHOS-PH SAL (URIMAR-T) 120 MG TABS    2 times daily    TAMSULOSIN (FLOMAX) 0.4 MG CAPSULE    Take 0.4 mg by mouth daily   Modified Medications    No medications on file   Discontinued Medications    No medications on file         Physical Exam   PHYSICAL EXAM:  Vitals:    01/31/22 1238   BP: 137/88   Pulse: 83   Resp: 16   Temp: 97.5 °F (36.4 °C)   TempSrc: Oral   SpO2: 97%   Weight: 150 lb (68 kg)   Height: 5' 8\" (1.727 m)         General: Alert, no distress, well-nourished  Neurologic  Mental status: eyes open spontaneously; nervously pacing   orientation to person, place, time, situation   Attention somewhat impaired but redirectable   Language fluent in conversation   Comprehension intact; follows simple commands; follows 2 step commands crossing midline    Cranial nerves:   CN2: Visual fields full w/o extinction on confrontational testing   Unable to visualize fundi  CN 3,4,6: Pupils equal and reactive to light, extraocular muscles intact  CN5: Facial sensation symmetric   CN7: Face symmetric  CN8: Hearing symmetric to spoken voice  CN9: Palate elevated symmetrically  CN11: Traps full strength on shoulder shrug  CN12: Tongue midline with protrusion    Motor Exam:   R  L    Deltoid 5  5   Biceps 5 5   Triceps 5 5   Wrist extension  5 5   Interossei 5 5      R  L    Hip flexion  5  5   Hip extension  5 5   Knee flexion  5 5   Knee extension  5 5   Ankle dorsiflexion  5 5   Ankle plantar flexion  5 5     Sensory: light touch intact and symmetric in all 4 extremities.   No sensory extinction on bilateral simultaneous stimulation  Cerebellar/coordination: finger nose finger normal without ataxia  Tone: normal in all 4 extremities  Gait: normal    OTHER SYSTEMS:  Cardiovascular: Warm, appears well perfused   Respiratory: Easy, non-labored respiratory pattern   Abdominal: Abdomen is without distention  Extremities: Upper and lower extremities are atraumatic in appearance without deformity. No swelling or erythema. Diagnostic Testing Results   IMAGES:  No images to review    LABS:  All results below personally reviewed. Pertinent positives & negatives are addressed in Impression & Recommendations below. LABS   Metabolic Panel No results for input(s): NA, K, CL, CO2, BUN, CREATININE, GLUCOSE, CALCIUM, LABALBU, PHOS, MG, ALKPHOS, ALT, AST, AMMONIA in the last 72 hours. CBC / Coags No results for input(s): WBC, RBC, HGB, HCT, PLT, INR in the last 72 hours. Other No results for input(s): LABA1C, LDLCALC, TRIG, TSH, VIPNMEKC12, FOLATE, LABSALI, COVID19 in the last 72 hours. No results for input(s): PHENYTOIN, KEPPRA, LACOSA, LAMO, VALPROATE, LACTSEPSIS, LACTA in the last 72 hours. IMPRESSION & RECOMMENDATIONS     IMPRESSION:  Mr. Scott Santillan is a 32year old gentleman with chronic pain in his arms and legs (on Cymbalta, Flexeril) of uncertain etiology who presents with acute on chronic worsening of his pain. RECOMMENDATIONS:  Discussed history and exam findings at length with attending neurologist.  Patient okay to d/c today and follow up with Dr. Anuradha Nowak as an outpatient    GISEL Valladares - CNP   Neurology & Neurocritical Care   Neurology Line: [phone]  [facility]: [facility] NPs  1/31/2022 3:51 PM    I spent 30 minutes in the care of this patient. Over 50% of that time was in face-to-face counseling regarding disease process, diagnostic testing, preventative measures, and answering patient and family questions.

## 2022-03-21 ENCOUNTER — HOSPITAL ENCOUNTER (EMERGENCY)
Age: 27
Discharge: HOME | End: 2022-03-21
Attending: EMERGENCY MEDICINE
Payer: MEDICARE

## 2022-03-21 VITALS
OXYGEN SATURATION: 96 % | HEART RATE: 76 BPM | DIASTOLIC BLOOD PRESSURE: 75 MMHG | HEIGHT: 68 IN | BODY MASS INDEX: 22.81 KG/M2 | TEMPERATURE: 97.6 F | RESPIRATION RATE: 15 BRPM | SYSTOLIC BLOOD PRESSURE: 117 MMHG

## 2022-03-21 DIAGNOSIS — F11.93 OPIOID WITHDRAWAL (HCC): Primary | ICD-10-CM

## 2022-03-21 LAB
A/G RATIO: 2 (ref 1.1–2.2)
ALBUMIN SERPL-MCNC: 4.7 G/DL (ref 3.4–5)
ALP BLD-CCNC: 69 U/L (ref 40–129)
ALT SERPL-CCNC: 14 U/L (ref 10–40)
AMPHETAMINE SCREEN, URINE: POSITIVE
ANION GAP SERPL CALCULATED.3IONS-SCNC: 15 MMOL/L (ref 3–16)
AST SERPL-CCNC: 20 U/L (ref 15–37)
BARBITURATE SCREEN URINE: ABNORMAL
BASOPHILS ABSOLUTE: 0.1 K/UL (ref 0–0.2)
BASOPHILS RELATIVE PERCENT: 1.4 %
BENZODIAZEPINE SCREEN, URINE: ABNORMAL
BILIRUB SERPL-MCNC: 0.4 MG/DL (ref 0–1)
BILIRUBIN URINE: NEGATIVE
BLOOD, URINE: NEGATIVE
BUN BLDV-MCNC: 16 MG/DL (ref 7–20)
CALCIUM SERPL-MCNC: 10.9 MG/DL (ref 8.3–10.6)
CANNABINOID SCREEN URINE: POSITIVE
CHLORIDE BLD-SCNC: 96 MMOL/L (ref 99–110)
CLARITY: CLEAR
CO2: 24 MMOL/L (ref 21–32)
COCAINE METABOLITE SCREEN URINE: ABNORMAL
COLOR: ABNORMAL
CREAT SERPL-MCNC: 1 MG/DL (ref 0.9–1.3)
EOSINOPHILS ABSOLUTE: 0.5 K/UL (ref 0–0.6)
EOSINOPHILS RELATIVE PERCENT: 6.3 %
ETHANOL: NORMAL MG/DL (ref 0–0.08)
GFR AFRICAN AMERICAN: >60
GFR NON-AFRICAN AMERICAN: >60
GLUCOSE BLD-MCNC: 98 MG/DL (ref 70–99)
GLUCOSE URINE: 100 MG/DL
HCT VFR BLD CALC: 46.4 % (ref 40.5–52.5)
HEMOGLOBIN: 16.5 G/DL (ref 13.5–17.5)
KETONES, URINE: ABNORMAL MG/DL
LEUKOCYTE ESTERASE, URINE: NEGATIVE
LYMPHOCYTES ABSOLUTE: 2.5 K/UL (ref 1–5.1)
LYMPHOCYTES RELATIVE PERCENT: 34.2 %
Lab: ABNORMAL
MCH RBC QN AUTO: 30 PG (ref 26–34)
MCHC RBC AUTO-ENTMCNC: 35.5 G/DL (ref 31–36)
MCV RBC AUTO: 84.7 FL (ref 80–100)
METHADONE SCREEN, URINE: ABNORMAL
MICROSCOPIC EXAMINATION: YES
MONOCYTES ABSOLUTE: 1.3 K/UL (ref 0–1.3)
MONOCYTES RELATIVE PERCENT: 17.5 %
NEUTROPHILS ABSOLUTE: 3 K/UL (ref 1.7–7.7)
NEUTROPHILS RELATIVE PERCENT: 40.6 %
NITRITE, URINE: POSITIVE
OPIATE SCREEN URINE: ABNORMAL
OXYCODONE URINE: ABNORMAL
PDW BLD-RTO: 12.6 % (ref 12.4–15.4)
PH UA: 6
PH UA: 6 (ref 5–8)
PHENCYCLIDINE SCREEN URINE: ABNORMAL
PLATELET # BLD: 314 K/UL (ref 135–450)
PMV BLD AUTO: 7.7 FL (ref 5–10.5)
POTASSIUM SERPL-SCNC: 3.6 MMOL/L (ref 3.5–5.1)
PROPOXYPHENE SCREEN: ABNORMAL
PROTEIN UA: 30 MG/DL
RBC # BLD: 5.48 M/UL (ref 4.2–5.9)
RBC UA: NORMAL /HPF (ref 0–4)
SODIUM BLD-SCNC: 135 MMOL/L (ref 136–145)
SPECIFIC GRAVITY UA: 1.02 (ref 1–1.03)
TOTAL PROTEIN: 7.1 G/DL (ref 6.4–8.2)
URINE TYPE: ABNORMAL
UROBILINOGEN, URINE: 2 E.U./DL
WBC # BLD: 7.4 K/UL (ref 4–11)
WBC UA: NORMAL /HPF (ref 0–5)

## 2022-03-21 PROCEDURE — 80307 DRUG TEST PRSMV CHEM ANLYZR: CPT

## 2022-03-21 PROCEDURE — 81001 URINALYSIS AUTO W/SCOPE: CPT

## 2022-03-21 PROCEDURE — 85025 COMPLETE CBC W/AUTO DIFF WBC: CPT

## 2022-03-21 PROCEDURE — 82077 ASSAY SPEC XCP UR&BREATH IA: CPT

## 2022-03-21 PROCEDURE — 6370000000 HC RX 637 (ALT 250 FOR IP): Performed by: EMERGENCY MEDICINE

## 2022-03-21 PROCEDURE — 99283 EMERGENCY DEPT VISIT LOW MDM: CPT

## 2022-03-21 PROCEDURE — 80053 COMPREHEN METABOLIC PANEL: CPT

## 2022-03-21 RX ORDER — NALOXONE HYDROCHLORIDE 4 MG/.1ML
1 SPRAY NASAL PRN
Status: DISCONTINUED | OUTPATIENT
Start: 2022-03-21 | End: 2022-03-21 | Stop reason: HOSPADM

## 2022-03-21 RX ORDER — ONDANSETRON 4 MG/1
4 TABLET, ORALLY DISINTEGRATING ORAL
Status: COMPLETED | OUTPATIENT
Start: 2022-03-21 | End: 2022-03-21

## 2022-03-21 RX ORDER — NALOXONE HYDROCHLORIDE 4 MG/.1ML
1 SPRAY NASAL PRN
Status: DISCONTINUED | OUTPATIENT
Start: 2022-03-21 | End: 2022-03-21

## 2022-03-21 RX ORDER — BUPRENORPHINE 2 MG/1
8 TABLET SUBLINGUAL DAILY
Status: DISCONTINUED | OUTPATIENT
Start: 2022-03-21 | End: 2022-03-21

## 2022-03-21 RX ORDER — BUPRENORPHINE 2 MG/1
8 TABLET SUBLINGUAL ONCE
Status: COMPLETED | OUTPATIENT
Start: 2022-03-21 | End: 2022-03-21

## 2022-03-21 RX ADMIN — ONDANSETRON 4 MG: 4 TABLET, ORALLY DISINTEGRATING ORAL at 05:56

## 2022-03-21 RX ADMIN — BUPRENORPHINE 8 MG: 2 TABLET SUBLINGUAL at 06:13

## 2022-03-21 RX ADMIN — NALOXONE HYDROCHLORIDE 1 SPRAY: 4 SPRAY NASAL at 10:05

## 2022-03-21 ASSESSMENT — PAIN DESCRIPTION - FREQUENCY: FREQUENCY: CONTINUOUS

## 2022-03-21 ASSESSMENT — PAIN DESCRIPTION - LOCATION: LOCATION: ARM;LEG

## 2022-03-21 ASSESSMENT — ENCOUNTER SYMPTOMS
DIARRHEA: 1
ABDOMINAL PAIN: 1
COUGH: 0
VOMITING: 1
NAUSEA: 1

## 2022-03-21 ASSESSMENT — PAIN SCALES - GENERAL
PAINLEVEL_OUTOF10: 10
PAINLEVEL_OUTOF10: 10

## 2022-03-21 ASSESSMENT — PAIN DESCRIPTION - PAIN TYPE: TYPE: ACUTE PAIN

## 2022-03-21 ASSESSMENT — PAIN DESCRIPTION - ONSET: ONSET: SUDDEN

## 2022-03-21 ASSESSMENT — PAIN - FUNCTIONAL ASSESSMENT: PAIN_FUNCTIONAL_ASSESSMENT: 0-10

## 2022-03-21 ASSESSMENT — PAIN DESCRIPTION - ORIENTATION: ORIENTATION: RIGHT;LEFT

## 2022-03-21 NOTE — ED NOTES
Pt sleeping wakes easily parent at bedside. Pt and parent are both agreeable to in patient placement.   is aware and will notify staff of any updates     [facility] RatliffCon[facility]  03/21/22 3143

## 2022-03-21 NOTE — ED PROVIDER NOTES
[address] [facility]          ATTENDING PHYSICIAN NOTE       Date of evaluation: 3/21/2022    Chief Complaint     Addiction Problem (states last used percocets yesterday at noon, came in for \"withdrawl symptoms of stinging and tighting of skin, muscle cramping of arms and legs, nausea\")      History of Present Illness     Ryanne Noguera is a 32 y.o. male with a history of ADD, asthma, and opioid use disorder who presents with complaints of pain all over his body, nausea, vomiting, diarrhea, and overall feeling poorly after discontinuing use of opioids approximately noon yesterday and it is now 5:30 in the morning. The patient had formerly been using street purchased Percocet and fentanyl. He has been through withdrawal before. He would like to come clean at this point and is considering [facility] as a place to do that. He also reporting stinging and tightening of the skin, muscle cramps and leg cramps. Review of Systems     Review of Systems   Constitutional: Negative for fever. Respiratory: Negative for cough. Cardiovascular: Positive for chest pain. Gastrointestinal: Positive for abdominal pain, diarrhea, nausea and vomiting. Musculoskeletal: Positive for arthralgias and myalgias. Psychiatric/Behavioral: The patient is nervous/anxious and is hyperactive. All other systems reviewed and are negative. Past Medical, Surgical, Family, and Social History     He has a past medical history of ADD (attention deficit disorder), Allergic rhinitis, Allergy, Asthma, Biliary dyskinesia, and Gluten-induced enteropathy. He has a past surgical history that includes [place] tooth extraction; Upper gastrointestinal endoscopy; and Cholecystectomy, laparoscopic (12/22/2017).   His family history includes Cancer in his maternal grandfather; Diabetes in his paternal grandmother; Heart Disease in his maternal grandfather; High Blood Pressure in his maternal grandfather; High Cholesterol in his maternal grandfather. He reports that he has been smoking cigarettes. He has a 6.00 pack-year smoking history. He has quit using smokeless tobacco. He reports current drug use. Drug: Marijuana Yamile Summers). He reports that he does not drink alcohol. Medications     Previous Medications    ALBUTEROL SULFATE HFA (PROAIR HFA) 108 (90 BASE) MCG/ACT INHALER    Inhale 2 puffs into the lungs every 6 hours as needed for Wheezing or Shortness of Breath    CALCIUM CARBONATE (TUMS) 500 MG CHEWABLE TABLET    Take 1 tablet by mouth daily as needed for Heartburn    CHOLECALCIFEROL (VITAMIN D3) 50 MCG (2000 UT) TABS    Take 1 tablet by mouth daily    CYCLOBENZAPRINE HCL (FLEXERIL PO)    Take by mouth daily    DULOXETINE (CYMBALTA) 20 MG EXTENDED RELEASE CAPSULE    every morning    IBUPROFEN (ADVIL;MOTRIN) 800 MG TABLET    3 times daily    PHENAZOPYRIDINE HCL (AZO TABS PO)    Take by mouth    TAMSULOSIN (FLOMAX) 0.4 MG CAPSULE    Take 0.4 mg by mouth daily       Allergies     He is allergic to amoxicillin, ceclor [cefaclor], and gluten meal.    Physical Exam     INITIAL VITALS: BP: (!) 140/126, Temp: 97.6 °F (36.4 °C), Pulse: 107, Resp: 20, SpO2: 96 %   Physical Exam  Vitals and nursing note reviewed. Constitutional:       General: He is in acute distress. Appearance: He is well-developed. He is not diaphoretic. HENT:      Head: Normocephalic. Eyes:      Extraocular Movements: Extraocular movements intact. Pupils: Pupils are equal, round, and reactive to light. Cardiovascular:      Rate and Rhythm: Regular rhythm. Tachycardia present. Pulmonary:      Effort: Pulmonary effort is normal.      Breath sounds: Normal breath sounds. Abdominal:      General: There is no distension. Palpations: Abdomen is soft. Tenderness: There is no abdominal tenderness. Musculoskeletal:         General: Normal range of motion. Skin:     General: Skin is warm and dry.    Neurological:      Mental Status: He is alert and oriented to person, place, and time. Psychiatric:         Behavior: Behavior is agitated and hyperactive.          Diagnostic Results     RADIOLOGY:  No orders to display     LABS:   Results for orders placed or performed during the hospital encounter of 03/21/22   Urine Drug Screen   Result Value Ref Range    pH, UA 6.0     Drug Screen Comment: see below    Ethanol   Result Value Ref Range    Ethanol Lvl None Detected mg/dL   Urinalysis   Result Value Ref Range    Color, UA ORANGE (A) Straw/Yellow    Clarity, UA Clear Clear    Glucose, Ur 100 (A) Negative mg/dL    Bilirubin Urine Negative Negative    Ketones, Urine TRACE (A) Negative mg/dL    Specific Gravity, UA 1.020 1.005 - 1.030    Blood, Urine Negative Negative    pH, UA 6.0 5.0 - 8.0    Protein, UA 30 (A) Negative mg/dL    Urobilinogen, Urine 2.0 (A) <2.0 E.U./dL    Nitrite, Urine POSITIVE (A) Negative    Leukocyte Esterase, Urine Negative Negative    Microscopic Examination YES     Urine Type Voided    CBC with Auto Differential   Result Value Ref Range    WBC 7.4 4.0 - 11.0 K/uL    RBC 5.48 4.20 - 5.90 M/uL    Hemoglobin 16.5 13.5 - 17.5 g/dL    Hematocrit 46.4 40.5 - 52.5 %    MCV 84.7 80.0 - 100.0 fL    MCH 30.0 26.0 - 34.0 pg    MCHC 35.5 31.0 - 36.0 g/dL    RDW 12.6 12.4 - 15.4 %    Platelets 363 059 - 967 K/uL    MPV 7.7 5.0 - 10.5 fL    Neutrophils % 40.6 %    Lymphocytes % 34.2 %    Monocytes % 17.5 %    Eosinophils % 6.3 %    Basophils % 1.4 %    Neutrophils Absolute 3.0 1.7 - 7.7 K/uL    Lymphocytes Absolute 2.5 1.0 - 5.1 K/uL    Monocytes Absolute 1.3 0.0 - 1.3 K/uL    Eosinophils Absolute 0.5 0.0 - 0.6 K/uL    Basophils Absolute 0.1 0.0 - 0.2 K/uL   Comprehensive Metabolic Panel   Result Value Ref Range    Sodium 135 (L) 136 - 145 mmol/L    Potassium 3.6 3.5 - 5.1 mmol/L    Chloride 96 (L) 99 - 110 mmol/L    CO2 24 21 - 32 mmol/L    Anion Gap 15 3 - 16    Glucose 98 70 - 99 mg/dL    BUN 16 7 - 20 mg/dL    CREATININE 1.0 0.9 - 1.3 mg/dL    GFR Non-African American >60 >60    GFR African American >60 >60    Calcium 10.9 (H) 8.3 - 10.6 mg/dL    Total Protein 7.1 6.4 - 8.2 g/dL    Albumin 4.7 3.4 - 5.0 g/dL    Albumin/Globulin Ratio 2.0 1.1 - 2.2    Total Bilirubin 0.4 0.0 - 1.0 mg/dL    Alkaline Phosphatase 69 40 - 129 U/L    ALT 14 10 - 40 U/L    AST 20 15 - 37 U/L   Microscopic Urinalysis   Result Value Ref Range    WBC, UA 0-2 0 - 5 /HPF    RBC, UA 0-2 0 - 4 /HPF       RECENT VITALS:  BP: (!) 140/126,Temp: 97.6 °F (36.4 °C), Pulse: 107, Resp: 20, SpO2: 96 %       ED Course     Nursing Notes, Past Medical Hx, Past Surgical Hx, Social Hx,Allergies, and Family Hx were reviewed. patient was given the following medications:  Orders Placed This Encounter   Medications    DISCONTD: buprenorphine (SUBUTEX) SL tablet 8 mg    buprenorphine (SUBUTEX) SL tablet 8 mg    ondansetron (ZOFRAN-ODT) disintegrating tablet 4 mg       CONSULTS:  None    MEDICAL DECISIONMAKING / ASSESSMENT / Adonica Gerald is a 32 y.o. male with a history of opioid use disorder who quit using it yesterday around noon and wishes to get some help to end his addiction. He appears to be in obvious opioid withdrawal at this time with a cow score initially of 19. He was started on buprenorphine here in the emergency department while awaiting laboratory evaluation and will need to be reassessed to see if this medication is helping him. He is interested in going to [facility] and they open at 9 AM and they will be contacted once open to see if they are able to accept this patient into their program.  Care will be turned over to the oncoming provider to contact [facility] for acceptance of this patient. He will also need reassessed after administration of buprenorphine as he may need additional dosing prior to disposition. Clinical Impression     1.  Opioid withdrawal ([facility] [address]        Disposition     PATIENT REFERRED TO:  No follow-up provider specified.     DISCHARGE MEDICATIONS:  New Prescriptions    No medications on file       DISPOSITION         Epi Olsen MD  03/21/22 5000

## 2022-03-21 NOTE — CARE COORDINATION
Social Work Consult     ADDENDUM:   SW met w/Pt's dad at bedside, Pt was in the bathroom. SW discussed treatment options in depth w/Pt's dad and answered all of his questions regarding IPU options. SW provided the details of [facility] and [address] [place] [address] that could accept Pt. SW provided a List of community resources and flyers w/contact info for facilities to Pt's dad. SW also added additional resources to DC instructions on AVS.  Electronically signed by IVAN Daigle LSW on 3/21/2022 at 10:06 AM       SW received a call from RN, Pt and Pt's family are wanting inpatient rehab. Pt has [employer] and his options for IPU will be limited. SW called [facility] [id number] in [place] and they do not accept [employer]. SW spoke to [facility], in [place], they do accept Pt's insurance but don't have a bed available. SW spoke to the [facility], their placement would take several days. SW spoke to Brooke [facility]athel[facility] in [facility], would be able to accept Pt. SW spoke to [facility], they would be able to accept Pt if Pt is able to find transport to them. They can accept him around 12:00pm/12:30pm, he may not admit him if he requires a higher level of care. Intake emailed HE[facility]DI several papers, SW will discuss Pt's options w/him and his dad.   Electronically signed by IVAN Daigle LSW on 3/21/2022 at 9:39 AM      IVAN Daigle LSW  Social Work/Case Management   ICU/PCU - [facility]   Ph: [phone]

## 2022-03-21 NOTE — ED PROVIDER NOTES
[address] [facility] [address] ENCOUNTER          ATTENDING PHYSICIAN NOTE       Date of evaluation: 3/21/2022    ADDENDUM:      Care of this patient was assumed from Dr Ren Khan. The patient was seen for Addiction Problem (states last used percocets yesterday at noon, came in for \"withdrawl symptoms of stinging and tighting of skin, muscle cramping of arms and legs, nausea\")  . The patient's initial evaluation and plan have been discussed with the prior provider who initially evaluated the patient. Nursing Notes, Past Medical Hx, Past Surgical Hx, Social Hx, Allergies, and Family Hx were all reviewed.     Diagnostic Results     EKG       RADIOLOGY:  No orders to display       LABS:   Results for orders placed or performed during the hospital encounter of 03/21/22   Urine Drug Screen   Result Value Ref Range    Amphetamine Screen, Urine POSITIVE (A) Negative <1000ng/mL    Barbiturate Screen, Ur Neg Negative <200 ng/mL    Benzodiazepine Screen, Urine Neg Negative <200 ng/mL    Cannabinoid Scrn, Ur POSITIVE (A) Negative <50 ng/mL    Cocaine Metabolite Screen, Urine Neg Negative <300 ng/mL    Opiate Scrn, Ur Neg Negative <300 ng/mL    PCP Screen, Urine Neg Negative <25 ng/mL    Methadone Screen, Urine Neg Negative <300 ng/mL    Propoxyphene Scrn, Ur Neg Negative <300 ng/mL    Oxycodone Urine Neg Negative <100 ng/ml    pH, UA 6.0     Drug Screen Comment: see below    Ethanol   Result Value Ref Range    Ethanol Lvl None Detected mg/dL   Urinalysis   Result Value Ref Range    Color, UA ORANGE (A) Straw/Yellow    Clarity, UA Clear Clear    Glucose, Ur 100 (A) Negative mg/dL    Bilirubin Urine Negative Negative    Ketones, Urine TRACE (A) Negative mg/dL    Specific Gravity, UA 1.020 1.005 - 1.030    Blood, Urine Negative Negative    pH, UA 6.0 5.0 - 8.0    Protein, UA 30 (A) Negative mg/dL    Urobilinogen, Urine 2.0 (A) <2.0 E.U./dL    Nitrite, Urine POSITIVE (A) Negative    Leukocyte Esterase, Urine Negative Negative    Microscopic Examination YES     Urine Type Voided    CBC with Auto Differential   Result Value Ref Range    WBC 7.4 4.0 - 11.0 K/uL    RBC 5.48 4.20 - 5.90 M/uL    Hemoglobin 16.5 13.5 - 17.5 g/dL    Hematocrit 46.4 40.5 - 52.5 %    MCV 84.7 80.0 - 100.0 fL    MCH 30.0 26.0 - 34.0 pg    MCHC 35.5 31.0 - 36.0 g/dL    RDW 12.6 12.4 - 15.4 %    Platelets 918 521 - 816 K/uL    MPV 7.7 5.0 - 10.5 fL    Neutrophils % 40.6 %    Lymphocytes % 34.2 %    Monocytes % 17.5 %    Eosinophils % 6.3 %    Basophils % 1.4 %    Neutrophils Absolute 3.0 1.7 - 7.7 K/uL    Lymphocytes Absolute 2.5 1.0 - 5.1 K/uL    Monocytes Absolute 1.3 0.0 - 1.3 K/uL    Eosinophils Absolute 0.5 0.0 - 0.6 K/uL    Basophils Absolute 0.1 0.0 - 0.2 K/uL   Comprehensive Metabolic Panel   Result Value Ref Range    Sodium 135 (L) 136 - 145 mmol/L    Potassium 3.6 3.5 - 5.1 mmol/L    Chloride 96 (L) 99 - 110 mmol/L    CO2 24 21 - 32 mmol/L    Anion Gap 15 3 - 16    Glucose 98 70 - 99 mg/dL    BUN 16 7 - 20 mg/dL    CREATININE 1.0 0.9 - 1.3 mg/dL    GFR Non-African American >60 >60    GFR African American >60 >60    Calcium 10.9 (H) 8.3 - 10.6 mg/dL    Total Protein 7.1 6.4 - 8.2 g/dL    Albumin 4.7 3.4 - 5.0 g/dL    Albumin/Globulin Ratio 2.0 1.1 - 2.2    Total Bilirubin 0.4 0.0 - 1.0 mg/dL    Alkaline Phosphatase 69 40 - 129 U/L    ALT 14 10 - 40 U/L    AST 20 15 - 37 U/L   Microscopic Urinalysis   Result Value Ref Range    WBC, UA 0-2 0 - 5 /HPF    RBC, UA 0-2 0 - 4 /HPF       RECENT VITALS:  BP: (!) 140/126, Temp: 97.6 °F (36.4 °C), Pulse: 107, Resp: 13, SpO2: 96 %     Procedures         ED Course     The patient was given the following medications:  Orders Placed This Encounter   Medications    DISCONTD: buprenorphine (SUBUTEX) SL tablet 8 mg    buprenorphine (SUBUTEX) SL tablet 8 mg    ondansetron (ZOFRAN-ODT) disintegrating tablet 4 mg       CONSULTS:  IP CONSULT TO CASE MANAGEMENT    MEDICAL DECISION Juan F Toledo / Matty Mena / Jermaine Pro Lula Mena is a 32 y.o. male presents with apparent opioid withdrawal.  I reassess patient approximately an hour after receiving a milligrams buprenorphine, and he is resting comfortably, without visible tremor, agitation, heart rate now in the 80s with a normal blood pressure and feeling much more comfortable. Case management was consulted for placement of patient to a substance abuse program, prior to final evaluation, patient said that he wanted to leave. He was given information for [facility] treatment centers, with referral to go there or others call for further information. Patient is able to ambulate without difficulty, left with his family. Clinical Impression     1. Opioid withdrawal ([facility] [address]        Disposition     PATIENT REFERRED TO:  No follow-up provider specified.     DISCHARGE MEDICATIONS:  New Prescriptions    No medications on file       DISPOSITION            Cristobal Bell MD  03/21/22 0501

## 2022-03-21 NOTE — PROGRESS NOTES
NALOXONE:  Patient Assessment and Dispensing Record   Date:  3/21/2022  Patient Name: Nargis Crawford  Patient Address: [address] [place] [address]         Patient Selection: Check that the following condition is met:  [x]  The individual receiving the information and medication is        oriented to person, place, and time and able to understand and learn the        essential components of overdose response and naloxone administration. Indication for dispensing naloxone: (check at least one)  [x]  Previous opioid intoxication or overdose. [x]  History of nonmedical opioid use.   []  Initiation or cessation of methadone or buprenorphine for opioid use disorder        treatment. []  Higher-dose (>50 mg morphine equivalent/day) opioid prescription. []  Receiving any opioid prescription plus (select below):  [] Rotated from one opioid to another because of possible incomplete cross-tolerance. [] Smoking, COPD, emphysema, asthma, sleep apnea, respiratory infection or other respiratory illness. [] Renal dysfunction, hepatic disease, cardiac illness or HIV/AIDS. [] Known or suspected concurrent alcohol use. [] Concurrent benzodiazepine or other sedative prescription. [] Concurrent antidepressant prescription. [] Patients who may have difficulty accessing emergency medical services (distance, remoteness). [x] Voluntary request from a family member, friend,  or other person in a position to assist an individual who is at risk of experiencing an opioid-related overdose. I (the undersigned) attest that:  (check each box to signify completion)  [x]  I am authorized per hospital protocol to dispense naloxone to this patient. [x]  The individual has been screened for contraindications/precautions and        counseled appropriately.   [x]  I have counseled the patient using the Overdose Recognition and Response Guide  [x]  I have updated the Home Med List to reflect this dispensing of naloxone. Signature:  Caity [facility]  3/21/2022, 10:07 AM       Kit given to father. At this time pt is going to a inpatient treatment center.

## 2022-03-21 NOTE — ED NOTES
at bedside to over placement for pt, family changed their mind and asked to leave.  Pt discharge     Cora Ang RN  03/21/22 7265

## 2022-05-19 ENCOUNTER — HOSPITAL ENCOUNTER (EMERGENCY)
Age: 27
Discharge: HOME | End: 2022-05-19
Attending: EMERGENCY MEDICINE
Payer: MEDICARE

## 2022-05-19 VITALS
TEMPERATURE: 98.2 F | WEIGHT: 140 LBS | OXYGEN SATURATION: 97 % | SYSTOLIC BLOOD PRESSURE: 124 MMHG | HEART RATE: 92 BPM | DIASTOLIC BLOOD PRESSURE: 84 MMHG | RESPIRATION RATE: 16 BRPM | BODY MASS INDEX: 21.29 KG/M2

## 2022-05-19 DIAGNOSIS — G62.9 NEUROPATHY: Primary | ICD-10-CM

## 2022-05-19 PROCEDURE — 96372 THER/PROPH/DIAG INJ SC/IM: CPT

## 2022-05-19 PROCEDURE — 99284 EMERGENCY DEPT VISIT MOD MDM: CPT

## 2022-05-19 PROCEDURE — 6370000000 HC RX 637 (ALT 250 FOR IP): Performed by: PHYSICIAN ASSISTANT

## 2022-05-19 PROCEDURE — 6360000002 HC RX W HCPCS: Performed by: PHYSICIAN ASSISTANT

## 2022-05-19 RX ORDER — OXYBUTYNIN CHLORIDE 15 MG/1
TABLET, EXTENDED RELEASE ORAL
Status: ON HOLD | COMMUNITY
Start: 2022-05-03 | End: 2022-09-12

## 2022-05-19 RX ORDER — GABAPENTIN 100 MG/1
100 CAPSULE ORAL ONCE
Status: COMPLETED | OUTPATIENT
Start: 2022-05-19 | End: 2022-05-19

## 2022-05-19 RX ORDER — GABAPENTIN 100 MG/1
100 CAPSULE ORAL 3 TIMES DAILY
Qty: 10 CAPSULE | Refills: 0 | Status: SHIPPED | OUTPATIENT
Start: 2022-05-19 | End: 2022-05-21

## 2022-05-19 RX ORDER — KETOROLAC TROMETHAMINE 30 MG/ML
15 INJECTION, SOLUTION INTRAMUSCULAR; INTRAVENOUS ONCE
Status: COMPLETED | OUTPATIENT
Start: 2022-05-19 | End: 2022-05-19

## 2022-05-19 RX ADMIN — GABAPENTIN 100 MG: 100 CAPSULE ORAL at 18:09

## 2022-05-19 RX ADMIN — KETOROLAC TROMETHAMINE 15 MG: 30 INJECTION, SOLUTION INTRAMUSCULAR; INTRAVENOUS at 17:55

## 2022-05-19 ASSESSMENT — ENCOUNTER SYMPTOMS
EYE REDNESS: 0
SHORTNESS OF BREATH: 0
NAUSEA: 0
ABDOMINAL PAIN: 0
VOMITING: 0

## 2022-05-19 ASSESSMENT — PAIN DESCRIPTION - LOCATION: LOCATION: OTHER (COMMENT)

## 2022-05-19 ASSESSMENT — PAIN SCALES - GENERAL
PAINLEVEL_OUTOF10: 9
PAINLEVEL_OUTOF10: 6
PAINLEVEL_OUTOF10: 9

## 2022-05-19 ASSESSMENT — PAIN - FUNCTIONAL ASSESSMENT
PAIN_FUNCTIONAL_ASSESSMENT: 0-10
PAIN_FUNCTIONAL_ASSESSMENT: 0-10

## 2022-05-19 ASSESSMENT — PAIN DESCRIPTION - PAIN TYPE: TYPE: CHRONIC PAIN

## 2022-05-19 NOTE — ED PROVIDER NOTES
ED Attending Attestation Note     Date of evaluation: 5/19/2022    This patient was seen by the advance practice provider. I have seen and examined the patient, agree with the workup, evaluation, management and diagnosis. The care plan has been discussed. My assessment reveals awake alert male who has a history of neuropathy for 5 years who is pain and \"neuropathy\" pain all over. He is awake alert.   Currently on Subutex      Dino May MD  05/19/22 9191

## 2022-05-19 NOTE — ED PROVIDER NOTES
[address] ENCOUN[address]          PHYSICIAN ASSISTANT NOTE       Date of evaluation: 5/19/2022    Chief Complaint     Other (presents to the ED with c/o of nerve pain in arms and legs that feels like a burning/stinging. States has had in the past and is currently out of prn medications to help manage pain.  feels like it isnt working anymore )      History of Present Illness     Sukh Brandon is a 32 y.o. male with PMH of substance abuse, anxiety who presents with nerve pain. Patient reports chronic burning and stinging pain in his arms and legs for the past 5 years. He claims that he has been diagnosed with fibromyalgia and follows with his primary care provider and rheumatology. He is prescribed Cymbalta and Flexeril for these symptoms. He states that he is currently in transition to a new primary doctor and has an appointment next Monday. He states that he is currently also in a substance abuse program and on subutex. He was previously prescribed tramadol but reports he is no longer taking it. He denies any current substance use. He is requesting a \"strong shot\" of pain medication and a prescription for gabapentin. He denies any fevers or chills, HA, cough, chest pain or shortness of breath, abdominal pain, nausea or vomiting, diarrhea, tremors, weakness or numbness. Review of Systems     Review of Systems   Constitutional: Negative for chills and fever. HENT: Negative for congestion. Eyes: Negative for redness. Respiratory: Negative for shortness of breath. Cardiovascular: Negative for chest pain. Gastrointestinal: Negative for abdominal pain, nausea and vomiting. Genitourinary: Negative for difficulty urinating. Musculoskeletal: Positive for myalgias. Negative for gait problem. Skin: Negative for wound. Neurological: Negative for dizziness, weakness, numbness and headaches. Psychiatric/Behavioral: The patient is not nervous/anxious. Past Medical, Surgical, Family, and Social History     He has a past medical history of ADD (attention deficit disorder), Allergic rhinitis, Allergy, Asthma, Biliary dyskinesia, and Gluten-induced enteropathy. He has a past surgical history that includes [place] tooth extraction; Upper gastrointestinal endoscopy; and Cholecystectomy, laparoscopic (12/22/2017). His family history includes Cancer in his maternal grandfather; Diabetes in his paternal grandmother; Heart Disease in his maternal grandfather; High Blood Pressure in his maternal grandfather; High Cholesterol in his maternal grandfather. He reports that he has been smoking cigarettes. He has a 6.00 pack-year smoking history. He has quit using smokeless tobacco. He reports current drug use. Drug: Marijuana Wendy Lux). He reports that he does not drink alcohol. Medications     Previous Medications    ALBUTEROL SULFATE HFA (PROAIR HFA) 108 (90 BASE) MCG/ACT INHALER    Inhale 2 puffs into the lungs every 6 hours as needed for Wheezing or Shortness of Breath    CALCIUM CARBONATE (TUMS) 500 MG CHEWABLE TABLET    Take 1 tablet by mouth daily as needed for Heartburn    CHOLECALCIFEROL (VITAMIN D3) 50 MCG (2000 UT) TABS    Take 1 tablet by mouth daily    CYCLOBENZAPRINE HCL (FLEXERIL PO)    Take by mouth daily    DULOXETINE (CYMBALTA) 20 MG EXTENDED RELEASE CAPSULE    every morning    IBUPROFEN (ADVIL;MOTRIN) 800 MG TABLET    3 times daily    OXYBUTYNIN (DITROPAN XL) 15 MG EXTENDED RELEASE TABLET    TAKE 1 TABLET BY MOUTH EVERY DAY    PHENAZOPYRIDINE HCL (AZO TABS PO)    Take by mouth    TAMSULOSIN (FLOMAX) 0.4 MG CAPSULE    Take 0.4 mg by mouth daily       Allergies     He is allergic to amoxicillin, ceclor [cefaclor], and gluten meal.    Physical Exam     INITIAL VITALS: BP: 121/77,Temp: 98.2 °F (36.8 °C), Pulse: 105, Resp: 16, SpO2: 97 %   Physical Exam  Vitals and nursing note reviewed. Constitutional:       General: He is not in acute distress.   HENT: Head: Normocephalic and atraumatic. Eyes:      Extraocular Movements: Extraocular movements intact. Conjunctiva/sclera: Conjunctivae normal.   Cardiovascular:      Rate and Rhythm: Normal rate and regular rhythm. Pulmonary:      Effort: Pulmonary effort is normal. No respiratory distress. Breath sounds: Normal breath sounds. No wheezing, rhonchi or rales. Abdominal:      General: Bowel sounds are normal. There is no distension. Palpations: Abdomen is soft. Tenderness: There is no abdominal tenderness. There is no guarding or rebound. Musculoskeletal:         General: No deformity. Cervical back: Neck supple. Skin:     General: Skin is warm and dry. Neurological:      General: No focal deficit present. Mental Status: He is alert and oriented to person, place, and time. GCS: GCS eye subscore is 4. GCS verbal subscore is 5. GCS motor subscore is 6. Cranial Nerves: No cranial nerve deficit, dysarthria or facial asymmetry. Sensory: No sensory deficit. Motor: No weakness. Coordination: Finger-Nose-Finger Test normal.      Gait: Gait normal.   Psychiatric:         Mood and Affect: Mood normal.         Behavior: Behavior normal.         Diagnostic Results     RADIOLOGY:  No orders to display       LABS:   No results found for this visit on 05/19/22. RECENT VITALS:  BP: 121/77, Temp: 98.2 °F (36.8 °C), Pulse: 105,Resp: 16, SpO2: 97 %     Procedures         ED Course     Nursing Notes, Past Medical Hx, Past Surgical Hx, Social Hx, Allergies, and Family Hx were reviewed. The patient was given the followingmedications:  Orders Placed This Encounter   Medications    ketorolac (TORADOL) injection 15 mg    gabapentin (NEURONTIN) capsule 100 mg    gabapentin (NEURONTIN) 100 MG capsule     Sig: Take 1 capsule by mouth 3 times daily for 3 days.  Intended supply: 90 days     Dispense:  10 capsule     Refill:  0       CONSULTS:  None    MEDICAL DECISION MAKING / ASSESSMENT / Joy Penny is a 32 y.o. male with PMH of substance abuse, anxiety who presents with chronic pain x 5 years. He is requesting something for pain. He does have a history of substance abuse and is currently in treatment, on subutex. He denies any additional complaints, no infectious symptoms, no opiate withdrawal symptoms. On exam, he has normal vitals. He is in no acute distress. Heart rhythm and rate are regular. Lungs clear auscultation. Abdomen soft and nontender. He has no focal neurologic deficit. He is ambulatory without difficulty. Patient was given IM Toradol and 100 mg of p.o. gabapentin for his pain. We will plan to prescribe him 10 tablets of gabapentin until he can see his PCP during his appointment on Monday. We discussed that narcotics are not appropriate for chronic pain and will not be prescribed. At this point in time, patient is stable for discharge. Patient was given strict return precautions as outlined in the AVS. Patient was agreeable and understanding to this plan of care. Prior to discharge, patient was ambulatory and PO tolerant. This patient was also evaluated by the attending physician. All care plans were discussed and agreed upon. Clinical Impression     1. Neuropathy        Disposition     PATIENT REFERRED TO:  Ole Choudhary MD  [address]  [address]  [phone]    Go on 5/23/2022      [facility] Emergency Department  [address]  [address]  [phone]    If symptoms worsen      DISCHARGE MEDICATIONS:  New Prescriptions    GABAPENTIN (NEURONTIN) 100 MG CAPSULE    Take 1 capsule by mouth 3 times daily for 3 days. Intended supply: 90 days       DISPOSITION  Discharge.        Jagdeep Johnson PA-C  05/19/22 6676

## 2022-05-19 NOTE — ED NOTES
Pt. In no acute distress. Breathing easy and educated on follow up with clinic. Ambulated out of ED.       Lacie Cottrell RN  05/19/22 9645

## 2022-05-21 ENCOUNTER — HOSPITAL ENCOUNTER (EMERGENCY)
Age: 27
Discharge: HOME | End: 2022-05-21
Attending: STUDENT IN AN ORGANIZED HEALTH CARE EDUCATION/TRAINING PROGRAM
Payer: MEDICARE

## 2022-05-21 VITALS
WEIGHT: 140 LBS | RESPIRATION RATE: 18 BRPM | TEMPERATURE: 98.4 F | HEART RATE: 83 BPM | OXYGEN SATURATION: 96 % | HEIGHT: 68 IN | SYSTOLIC BLOOD PRESSURE: 129 MMHG | BODY MASS INDEX: 21.22 KG/M2 | DIASTOLIC BLOOD PRESSURE: 96 MMHG

## 2022-05-21 DIAGNOSIS — M79.602 PAIN IN BOTH UPPER EXTREMITIES: Primary | ICD-10-CM

## 2022-05-21 DIAGNOSIS — M79.605 PAIN IN BOTH LOWER EXTREMITIES: ICD-10-CM

## 2022-05-21 DIAGNOSIS — M79.601 PAIN IN BOTH UPPER EXTREMITIES: Primary | ICD-10-CM

## 2022-05-21 DIAGNOSIS — M79.604 PAIN IN BOTH LOWER EXTREMITIES: ICD-10-CM

## 2022-05-21 PROCEDURE — 96372 THER/PROPH/DIAG INJ SC/IM: CPT

## 2022-05-21 PROCEDURE — 6360000002 HC RX W HCPCS: Performed by: STUDENT IN AN ORGANIZED HEALTH CARE EDUCATION/TRAINING PROGRAM

## 2022-05-21 PROCEDURE — 99284 EMERGENCY DEPT VISIT MOD MDM: CPT

## 2022-05-21 RX ORDER — GABAPENTIN 300 MG/1
300 CAPSULE ORAL 3 TIMES DAILY
Qty: 9 CAPSULE | Refills: 0 | Status: SHIPPED | OUTPATIENT
Start: 2022-05-21 | End: 2022-05-24

## 2022-05-21 RX ORDER — KETOROLAC TROMETHAMINE 30 MG/ML
15 INJECTION, SOLUTION INTRAMUSCULAR; INTRAVENOUS ONCE
Status: COMPLETED | OUTPATIENT
Start: 2022-05-21 | End: 2022-05-21

## 2022-05-21 RX ADMIN — KETOROLAC TROMETHAMINE 15 MG: 30 INJECTION, SOLUTION INTRAMUSCULAR; INTRAVENOUS at 17:49

## 2022-05-21 ASSESSMENT — PAIN SCALES - GENERAL
PAINLEVEL_OUTOF10: 8
PAINLEVEL_OUTOF10: 8

## 2022-05-21 ASSESSMENT — PAIN DESCRIPTION - DESCRIPTORS: DESCRIPTORS: BURNING

## 2022-05-21 ASSESSMENT — PAIN DESCRIPTION - PAIN TYPE: TYPE: ACUTE PAIN

## 2022-05-21 ASSESSMENT — PAIN DESCRIPTION - ORIENTATION: ORIENTATION: RIGHT;LEFT

## 2022-05-21 ASSESSMENT — PAIN DESCRIPTION - FREQUENCY: FREQUENCY: CONTINUOUS

## 2022-05-21 ASSESSMENT — PAIN DESCRIPTION - LOCATION: LOCATION: ARM;BACK;LEG

## 2022-05-21 ASSESSMENT — PAIN - FUNCTIONAL ASSESSMENT
PAIN_FUNCTIONAL_ASSESSMENT: 0-10
PAIN_FUNCTIONAL_ASSESSMENT: ACTIVITIES ARE NOT PREVENTED

## 2022-05-21 NOTE — ED PROVIDER NOTES
[facility]          ATTENDING PHYSICIAN NOTE       Date of evaluation: 5/21/2022    Chief Complaint     Leg Pain (pt states he was dx with fibromyalgia recently ) and Arm Pain      History of Present Illness     Yolette Brennan is a 32 y.o. male who presents with pain in his bilateral legs, arms, penis. Patient states that he has had pain in his bilateral arms, penis, bilateral legs for many months and has not changed however is persistent. He states he has been diagnosed with fibromyalgia and this could be causing his pain. He was seen recently in discharge with gabapentin 100 mg 3 times daily. He has completed this and states that it did help with his pain a bit. He plans to see his PCP early next week. He denies any difficulty urinating. Denies any chest pain, abdominal pain, nausea, vomiting. He has also been taking his SNRI for his fibromyalgia as well. Review of Systems     Positive for: Arm pain, leg pain, penis pain, back pain  Negative for: Headache, abdominal pain    Other systems reviewed and negative. Past Medical, Surgical, Family, and Social History     He has a past medical history of ADD (attention deficit disorder), Allergic rhinitis, Allergy, Asthma, Biliary dyskinesia, and Gluten-induced enteropathy. He has a past surgical history that includes [place] tooth extraction; Upper gastrointestinal endoscopy; and Cholecystectomy, laparoscopic (12/22/2017). His family history includes Cancer in his maternal grandfather; Diabetes in his paternal grandmother; Heart Disease in his maternal grandfather; High Blood Pressure in his maternal grandfather; High Cholesterol in his maternal grandfather. He reports that he has been smoking cigarettes. He has a 6.00 pack-year smoking history. He has quit using smokeless tobacco. He reports current drug use. Drug: Marijuana Alice Ing). He reports that he does not drink alcohol.     Medications     Discharge Medication List as of 5/21/2022  5:51 PM      CONTINUE these medications which have NOT CHANGED    Details   oxybutynin (DITROPAN XL) 15 MG extended release tablet TAKE 1 TABLET BY MOUTH EVERY DAYHistorical Med      Phenazopyridine HCl (AZO TABS PO) Take by mouthHistorical Med      DULoxetine (CYMBALTA) 20 MG extended release capsule every morningHistorical Med      Cyclobenzaprine HCl (FLEXERIL PO) Take by mouth dailyHistorical Med      Cholecalciferol (VITAMIN D3) 50 MCG (2000 UT) TABS Take 1 tablet by mouth daily, Disp-30 tablet, R-5Normal      albuterol sulfate HFA (PROAIR HFA) 108 (90 Base) MCG/ACT inhaler Inhale 2 puffs into the lungs every 6 hours as needed for Wheezing or Shortness of Breath, Disp-1 each, R-2Normal      ibuprofen (ADVIL;MOTRIN) 800 MG tablet 3 times dailyHistorical Med      tamsulosin (FLOMAX) 0.4 MG capsule Take 0.4 mg by mouth dailyHistorical Med      calcium carbonate (TUMS) 500 MG chewable tablet Take 1 tablet by mouth daily as needed for HeartburnHistorical Med             Allergies     He is allergic to amoxicillin, ceclor [cefaclor], and gluten meal.    Physical Exam     INITIAL VITALS: BP: (!) 129/96, Temp: 98.4 °F (36.9 °C), Pulse: 83, Resp: 18, SpO2: 96 %     General: nontoxic, no acute distress   HEENT: NCAT, EOMI grossly intact, external nose normal, no stridor  Neck: supple, no pain with movement. No midline tenderness in CTL spine with palpation. Cardiac: normal rate, regular rhythm, well perfused  Respiratory: clear to auscultation bilaterally, no respiratory distress, no cough  Abdominal: soft, nontender to palpation, no rebound tenderness  Extremities: no pitting edema  Musculoskeletal: no long bone deformities  Skin: no diaphoresis, no rash  Neuro: alert and oriented, moving extremities symmetrically, clear speech. Sensation is intact throughout all extremities.   Psych: appropriate mood, normal affect    Diagnostic Results     EKG    N/A    RADIOLOGY:  No orders to display       LABS:   No results found for this visit on 05/21/22. ED BEDSIDE ULTRASOUND:   N/A    RECENT VITALS:  BP: (!) 129/96,Temp: 98.4 °F (36.9 °C), Pulse: 83, Resp: 18, SpO2: 96 %     Procedures      N/A    ED Course     Nursing Notes, Past Medical Hx, Past Surgical Hx, Social Hx,Allergies, and Family Hx were reviewed. patient was given the following medications:  Orders Placed This Encounter   Medications    ketorolac (TORADOL) injection 15 mg    gabapentin (NEURONTIN) 300 MG capsule     Sig: Take 1 capsule by mouth 3 times daily for 3 days. Intended supply: 30 days     Dispense:  9 capsule     Refill:  0       CONSULTS:  None    MEDICAL DECISIONMAKING / ASSESSMENT / Sebastian Amanda is a 32 y.o. male here with pain all over his body including, arms, legs, penis, upper and lower back. He states that there is no significant change in pain, however he has run out of his gabapentin which was helpful. On chart review, he was started 100 mg 3 times daily a couple days ago. I will titrate him up to 300 mg 3 times daily since it was helping his pain and give him a few days worth. He will follow-up with his PCP earlier this week for further evaluation and further medication management. He states that his pain has not changed so we did not do a genitalia exam at this time. He denies any difficulty urinating or pain with urination. He had good strength and sensation in all extremities and was able to ambulate, low suspicion for any spinal process at this time. He found a Toradol IM helpful last time he was seen here so we will repeat that today    Stable for discharge. Clinical Impression     1. Pain in both upper extremities    2. Pain in both lower extremities        Disposition     PATIENT REFERRED TO:  No follow-up provider specified.     DISCHARGE MEDICATIONS:  Discharge Medication List as of 5/21/2022  5:51 PM          DISPOSITION Decision To Discharge 05/21/2022 05:46:27 PM Vale Lopez MD  05/21/22 9556

## 2022-05-24 ENCOUNTER — OFFICE VISIT (OUTPATIENT)
Dept: FAMILY MEDICINE CLINIC | Age: 27
End: 2022-05-24
Payer: MEDICARE

## 2022-05-24 VITALS
WEIGHT: 137 LBS | DIASTOLIC BLOOD PRESSURE: 74 MMHG | OXYGEN SATURATION: 97 % | BODY MASS INDEX: 20.83 KG/M2 | HEART RATE: 84 BPM | SYSTOLIC BLOOD PRESSURE: 110 MMHG

## 2022-05-24 DIAGNOSIS — G47.09 OTHER INSOMNIA: ICD-10-CM

## 2022-05-24 DIAGNOSIS — K21.9 GASTROESOPHAGEAL REFLUX DISEASE WITHOUT ESOPHAGITIS: ICD-10-CM

## 2022-05-24 DIAGNOSIS — G89.4 CHRONIC PAIN SYNDROME: Primary | ICD-10-CM

## 2022-05-24 DIAGNOSIS — R33.9 INCOMPLETE BLADDER EMPTYING: ICD-10-CM

## 2022-05-24 DIAGNOSIS — J45.20 MILD INTERMITTENT ASTHMA WITHOUT COMPLICATION: ICD-10-CM

## 2022-05-24 DIAGNOSIS — F19.10 DRUG ABUSE (HCC): ICD-10-CM

## 2022-05-24 PROBLEM — R10.84 GENERALIZED ABDOMINAL PAIN: Status: RESOLVED | Noted: 2021-11-30 | Resolved: 2022-05-24

## 2022-05-24 PROBLEM — M79.605 PAIN IN BOTH LOWER EXTREMITIES: Status: RESOLVED | Noted: 2021-11-30 | Resolved: 2022-05-24

## 2022-05-24 PROBLEM — M79.604 PAIN IN BOTH LOWER EXTREMITIES: Status: RESOLVED | Noted: 2021-11-30 | Resolved: 2022-05-24

## 2022-05-24 PROBLEM — F32.9 CURRENT EPISODE OF MAJOR DEPRESSIVE DISORDER WITHOUT PRIOR EPISODE: Status: RESOLVED | Noted: 2021-11-30 | Resolved: 2022-05-24

## 2022-05-24 PROCEDURE — 99204 OFFICE O/P NEW MOD 45 MIN: CPT | Performed by: FAMILY MEDICINE

## 2022-05-24 RX ORDER — DULOXETIN HYDROCHLORIDE 60 MG/1
1 CAPSULE, DELAYED RELEASE ORAL 2 TIMES DAILY
Status: ON HOLD | COMMUNITY
Start: 2022-05-04 | End: 2022-09-16

## 2022-05-24 RX ORDER — TRAZODONE HYDROCHLORIDE 50 MG/1
1 TABLET ORAL NIGHTLY
Status: ON HOLD | COMMUNITY
Start: 2022-05-07 | End: 2022-09-16

## 2022-05-24 RX ORDER — BUSPIRONE HYDROCHLORIDE 30 MG/1
1 TABLET ORAL 2 TIMES DAILY
Status: ON HOLD | COMMUNITY
Start: 2022-05-03 | End: 2022-09-16

## 2022-05-24 RX ORDER — NABUMETONE 500 MG/1
500-1000 TABLET, FILM COATED ORAL 2 TIMES DAILY PRN
Qty: 60 TABLET | Refills: 0 | Status: SHIPPED | OUTPATIENT
Start: 2022-05-24 | End: 2022-06-15 | Stop reason: SDUPTHER

## 2022-05-24 ASSESSMENT — COLUMBIA-SUICIDE SEVERITY RATING SCALE - C-SSRS
2. HAVE YOU ACTUALLY HAD ANY THOUGHTS OF KILLING YOURSELF?: NO
6. HAVE YOU EVER DONE ANYTHING, STARTED TO DO ANYTHING, OR PREPARED TO DO ANYTHING TO END YOUR LIFE?: NO
1. WITHIN THE PAST MONTH, HAVE YOU WISHED YOU WERE DEAD OR WISHED YOU COULD GO TO SLEEP AND NOT WAKE UP?: NO

## 2022-05-24 ASSESSMENT — PATIENT HEALTH QUESTIONNAIRE - PHQ9
9. THOUGHTS THAT YOU WOULD BE BETTER OFF DEAD, OR OF HURTING YOURSELF: 0
5. POOR APPETITE OR OVEREATING: 3
6. FEELING BAD ABOUT YOURSELF - OR THAT YOU ARE A FAILURE OR HAVE LET YOURSELF OR YOUR FAMILY DOWN: 3
SUM OF ALL RESPONSES TO PHQ QUESTIONS 1-9: 24
3. TROUBLE FALLING OR STAYING ASLEEP: 3
7. TROUBLE CONCENTRATING ON THINGS, SUCH AS READING THE NEWSPAPER OR WATCHING TELEVISION: 3
10. IF YOU CHECKED OFF ANY PROBLEMS, HOW DIFFICULT HAVE THESE PROBLEMS MADE IT FOR YOU TO DO YOUR WORK, TAKE CARE OF THINGS AT HOME, OR GET ALONG WITH OTHER PEOPLE: 3
SUM OF ALL RESPONSES TO PHQ QUESTIONS 1-9: 24
4. FEELING TIRED OR HAVING LITTLE ENERGY: 3
8. MOVING OR SPEAKING SO SLOWLY THAT OTHER PEOPLE COULD HAVE NOTICED. OR THE OPPOSITE, BEING SO FIGETY OR RESTLESS THAT YOU HAVE BEEN MOVING AROUND A LOT MORE THAN USUAL: 3
2. FEELING DOWN, DEPRESSED OR HOPELESS: 3
1. LITTLE INTEREST OR PLEASURE IN DOING THINGS: 3
SUM OF ALL RESPONSES TO PHQ9 QUESTIONS 1 & 2: 6

## 2022-05-24 NOTE — PATIENT INSTRUCTIONS
Patient Education        Asthma in Adults: Care Instructions  Overview     Asthma makes it hard for you to breathe. During an asthma attack, the airways swell and narrow. Severe asthma attacks can be dangerous, but you can usually prevent them. Controlling asthma and treating symptoms before they get bad canhelp you avoid bad attacks. You may also avoid future trips to the doctor. Follow-up care is a key part of your treatment and safety. Be sure to make and go to all appointments, and call your doctor if you are having problems. It's also a good idea to know your test results and keep alist of the medicines you take. How can you care for yourself at home?  Follow your asthma action plan so you can manage your symptoms at home. An asthma action plan will help you prevent and control airway reactions and will tell you what to do during an asthma attack. If you do not have an asthma action plan, work with your doctor to build one.  Take your asthma medicine exactly as prescribed. Medicine plays an important role in controlling asthma. Talk to your doctor right away if you have any questions about what to take and how to take it. ? Use your quick-relief medicine when you have symptoms of an asthma attack. Some people need to use quick-relief medicine before they exercise to prevent asthma symptoms. Albuterol is a quick-relief medicine that is often used. In some cases, a certain type of controller inhaler is used as a quick-relief medicine. Ask your doctor what to use for quick relief. ? Take your controller medicine. If you have symptoms often, you will likely need to take it every day. Controller medicine usually includes an inhaled corticosteroid. The goal is to prevent problems before they occur. ? If your doctor prescribed corticosteroid pills to use during an attack, take them as directed. They may take hours to work, but they may shorten the attack and help you breathe better. ?  Keep your quick-relief need emergency care. For example, call if:     You have severe trouble breathing. Call your doctor now or seek immediate medical care if:     Your symptoms do not get better after you have followed your asthma action plan.      You cough up yellow, dark brown, or bloody mucus (sputum). Watch closely for changes in your health, and be sure to contact your doctor if:     Your coughing and wheezing get worse.      You need to use quick-relief medicine on more than 2 days a week within a month (unless it is just for exercise).      You need help figuring out what is triggering your asthma attacks. Where can you learn more? Go to https://[email or web address] [email or web address]. org and sign in to your [employer] account. Enter P597 in the zEconomy box to learn more about \"Asthma in Adults: Care Instructions. \"     If you do not have an account, please click on the \"Sign Up Now\" link. Current as of: July 6, 2021               Content Version: 13.2  © 2006-2022 Healthwise, Incorporated. Care instructions adapted under license by [facility] ([facility]). If you have questions about a medical condition or this instruction, always ask your healthcare professional. [place] [address] any warranty or liability for your use of this information.

## 2022-05-24 NOTE — PROGRESS NOTES
Pietro Hoover is a 32 y.o. Male who came into the clinic today to establish care with me and for appropriate   management. Since I am seeing the patient for the first time today I did review in detail his medical history   along with the medications the patient is currently on. The patient informs me that he has asthma for which   he occasionally has to use his albuterol inhaler. The patient also has urinary issues for which he has followed   up with a urologist in the past and as per the patient he has had multiple testing done. He is currently on   oxybutynin and tamsulosin which does help him some with his symptoms. The patient also has anxiety and   depression for which he is on medications and his symptoms are well controlled on current dosage of the   medication. The patient follows up with a provider in [place], [place] for the same. The patient has insomnia   for which he is on trazodone and it does help him with his symptoms. The patient informs me that he has chronic   pain throughout his body. As per the patient he has had multiple testings done in the past and was recommended   that he has fibromyalgia. The patient has followed up with a neurologist and had nerve conduction studies done   which were unremarkable. As per the patient at this point he would like to be referred to a rheumatologist for   further work-up/recommendation and management. The patient informs me that recently he went to the ER where   he was given a prescription for gabapentin which helped him some with his symptoms. I advised the patient to   discuss this with the rheumatologist at his next appointment. The patient verbalized understanding and agreed   with the plan. The patient was wondering if he could have any prescription medication to help him with the pain   in the meanwhile. He is agreeable to try nabumetone at this time.   No other questions or concerns today and all   the question and concerns were appropriately answered.     I reviewed and discussed below mentioned labs with the patient today:    Lab Results   Component Value Date    WBC 7.4 03/21/2022    RBC 5.48 03/21/2022    MCV 84.7 03/21/2022    MCHC 35.5 03/21/2022     Lab Results   Component Value Date    ANIONGAP 15 03/21/2022    GLUCOSE 98 03/21/2022    BUN 16 03/21/2022    CREATININE 1.0 03/21/2022    AGRATIO 2.0 03/21/2022    CALCIUM 10.9 03/21/2022     No results found for: CHOLESTEROL, LDL  No components found for: HGBA1C  Lab Results   Component Value Date    TSH 1.46 12/19/2016     No results found for: PSA    Past Medical History:   Diagnosis Date    ADD (attention deficit disorder) 11/17/2014    Allergic rhinitis 9/4/2013    Allergy     Asthma 9/4/2013    Biliary dyskinesia     Gluten-induced enteropathy 12/17/2015       Patient Active Problem List   Diagnosis    ADD (attention deficit disorder)    Drug abuse, opioid type ([address]    Oppositional defiant disorder    Amphetamine abuse ([address]    Nondependent opioid abuse ([address]    Cannabis abuse    Benzodiazepine dependence ([address]    Insomnia    PND (paroxysmal nocturnal dyspnea)    Gluten-induced enteropathy    Vitamin D deficiency    Chronic nonseasonal allergic rhinitis due to pollen    Mild intermittent asthma without complication    Neuropathy    Incomplete bladder emptying    Cigarette nicotine dependence with nicotine-induced disorder    Chronic pain syndrome    Gastroesophageal reflux disease without esophagitis       Past Surgical History:   Procedure Laterality Date    CHOLECYSTECTOMY, LAPAROSCOPIC  12/22/2017    lap-cristine    UPPER GASTROINTESTINAL ENDOSCOPY      WISDOM TOOTH EXTRACTION         Family History   Problem Relation Age of Onset    Diabetes Paternal Grandmother     Cancer Maternal Grandfather     High Blood Pressure Maternal Grandfather     High Cholesterol Maternal Grandfather     Heart Disease Maternal Grandfather        Social History     Tobacco Use    Smoking status: Current Every Day Smoker     Packs/day: 1.00     Years: 6.00     Pack years: 6.00     Types: Cigarettes    Smokeless tobacco: Former User    Tobacco comment: smokes 1-2 packs per day   Substance Use Topics    Alcohol use: No       Current Outpatient Medications on File Prior to Visit   Medication Sig Dispense Refill    oxybutynin (DITROPAN XL) 15 MG extended release tablet TAKE 1 TABLET BY MOUTH EVERY DAY      Cyclobenzaprine HCl (FLEXERIL PO) Take by mouth daily      albuterol sulfate HFA (PROAIR HFA) 108 (90 Base) MCG/ACT inhaler Inhale 2 puffs into the lungs every 6 hours as needed for Wheezing or Shortness of Breath 1 each 2    tamsulosin (FLOMAX) 0.4 MG capsule Take 0.4 mg by mouth daily      calcium carbonate (TUMS) 500 MG chewable tablet Take 1 tablet by mouth daily as needed for Heartburn      busPIRone (BUSPAR) 30 MG tablet Take 1 tablet by mouth 2 times daily      DULoxetine (CYMBALTA) 60 MG extended release capsule Take 1 capsule by mouth 2 times daily      traZODone (DESYREL) 50 MG tablet Take 1 tablet by mouth nightly       No current facility-administered medications on file prior to visit. Allergies   Allergen Reactions    Amoxicillin Hives    Ceclor [Cefaclor]      Joints swelled     Gluten Meal Nausea And Vomiting     fatigue       REVIEW OF SYSTEMS:   CONSTITUTIONAL: No weight loss, fever, chills, weakness or fatigue. HEENT: Eyes: No visual loss, blurred vision, double vision or yellow sclerae. Ears, Nose, Throat: No hearing loss, sneezing, congestion, runny nose or sore throat. SKIN: No rash or itching. CARDIOVASCULAR: No chest pain, chest pressure or chest discomfort. No palpitations or edema. RESPIRATORY: Occasional shortness of breath. No cough or sputum. GASTROINTESTINAL: No anorexia, nausea, vomiting, diarrhea or constipation. No abdominal pain, hematochezia or melena. GERD.   GENITOURINARY:No dysuria, urgency, frequency, hematuria. Incomplete bladder emptying. NEUROLOGICAL: No headache, dizziness, syncope, paralysis, ataxia,   numbness or tingling in the extremities. No change in bowel or bladder control. MUSCULOSKELETAL: Complains of muscle pain, back pain, joint pain or stiffness. PSYCHIATRIC: History of depression or anxiety. ENDOCRINOLOGIC: No reports of sweating, cold or heat intolerance. No polyuria or polydipsia. Objective     . /74   Pulse 84   Wt 137 lb (62.1 kg)   SpO2 97%   BMI 20.83 kg/m²     GENERAL:  Kenroy Lin is a 32 y.o.  male who is not in any acute distress. He was well attired and well groomed and was speaking in full sentences. HEENT:  Head:  Atraumatic, normocephalic. Eyes: Both the pupils are round,   equal and reactive to light. No squint noted. Normal red reflex is seen. Ears: Both external auditory canals are clear. No discharge noted. Tympanic membranes healthy. Normal light reflex is seen. Nose: Both the   nares are clear. No discharge noted. No epistaxis. Throat:  No posterior   pharyngeal wall erythema. Oral mucosa moist. No enlarged tonsils. NECK:  Supple. No cervical lymphadenopathy. No thyromegaly. LUNGS:  Normal ventilatory breath sounds are heard bilaterally. No crackles   or wheezes heard. CARDIOVASCULAR:  Normal S1, S2 heard. No murmurs heard. No JVD. GASTROINTESTINAL:  Abdomen soft, nontender. No guarding or rigidity noted. No organomegaly noted. Normal bowel sounds were heard. EXTREMITIES:  All 4 extremities were moving fine. Full range of motion is   present. No deformity noted. Peripheral pulses were felt. No lower   extremity edema. Neurovascular integrity maintained. NEUROLOGICAL:  The patient was alert, conscious, and cooperative. Oriented   to time, place, and person. Muscle power in all 4 limbs is 5/5. Cranial   nerves II thru XII are grossly intact. No sensory or motor loss. PSYCHIATRIC:  Appropriate mood and affect. No flights of ideas or thoughts. Intact recent and past memory. No confusion or delirium. No suicidal or homicidal   ideations. PHQ 9 score of 24. BACK:  Normal alignment of the spine. Full range of motion is present. No   stepoff. No deformity. No spinal or paraspinal tenderness noted. Assessment/Plan     Diagnoses and all orders for this visit:    Chronic pain syndrome  -     Farzana Gar MD, Rheumatology, [place]  -     nabumetone (RELAFEN) 500 MG tablet; Take 1-2 tablets by mouth 2 times daily as needed for Pain    Mild intermittent asthma without complication    Gastroesophageal reflux disease without esophagitis    Other insomnia    Incomplete bladder emptying    Drug abuse ([address]      Advise given:  - Take all prescription medication as prescribed. - Drink plenty of fluids.   - Eat five servings of fruits and vegetables everyday. - Discussed importance of regular exercise and recommended starting or continuing a regular exercise program for good health. - Try to lose weight with diet and exercise as discussed. - The importance of annual eye exams was reviewed. - The importance of proper foot care and regularly checking feet to prevent sores and possibly loss of limbs was reviewed. - Appropriate handout were given to the patient. -  All the questions and concerns were appropriately answered. - Patient / family member / caregiver verbalized understanding of patient instructions from today's visit. - The patient was advised to follow up with me in 6 months for recheck or can call me before if has any other questions or concerns.

## 2022-05-28 ENCOUNTER — HOSPITAL ENCOUNTER (EMERGENCY)
Age: 27
Discharge: HOME | End: 2022-05-28
Attending: STUDENT IN AN ORGANIZED HEALTH CARE EDUCATION/TRAINING PROGRAM
Payer: MEDICARE

## 2022-05-28 VITALS
BODY MASS INDEX: 21.22 KG/M2 | HEART RATE: 89 BPM | SYSTOLIC BLOOD PRESSURE: 123 MMHG | WEIGHT: 140 LBS | RESPIRATION RATE: 16 BRPM | TEMPERATURE: 98.4 F | DIASTOLIC BLOOD PRESSURE: 96 MMHG | OXYGEN SATURATION: 96 % | HEIGHT: 68 IN

## 2022-05-28 DIAGNOSIS — G89.29 OTHER CHRONIC PAIN: Primary | ICD-10-CM

## 2022-05-28 LAB
ANION GAP SERPL CALCULATED.3IONS-SCNC: 19 MMOL/L (ref 3–16)
BASE EXCESS VENOUS: 1.9 MMOL/L (ref -2–3)
BUN BLDV-MCNC: 15 MG/DL (ref 7–20)
CALCIUM SERPL-MCNC: 9.6 MG/DL (ref 8.3–10.6)
CARBOXYHEMOGLOBIN: 2.9 % (ref 0–1.5)
CHLORIDE BLD-SCNC: 100 MMOL/L (ref 99–110)
CO2: 17 MMOL/L (ref 21–32)
CREAT SERPL-MCNC: 1.1 MG/DL (ref 0.9–1.3)
GFR AFRICAN AMERICAN: >60
GFR NON-AFRICAN AMERICAN: >60
GLUCOSE BLD-MCNC: 111 MG/DL (ref 70–99)
HCO3 VENOUS: 28.4 MMOL/L (ref 24–28)
HEMOGLOBIN, VEN, REDUCED: 15.6 %
LACTIC ACID: 1 MMOL/L (ref 0.4–2)
METHEMOGLOBIN VENOUS: 0.2 % (ref 0–1.5)
O2 SAT, VEN: 84 %
PCO2, VEN: 50.1 MMHG (ref 41–51)
PH VENOUS: 7.36 (ref 7.35–7.45)
PO2, VEN: 48.6 MMHG (ref 25–40)
POTASSIUM REFLEX MAGNESIUM: 4.2 MMOL/L (ref 3.5–5.1)
SODIUM BLD-SCNC: 136 MMOL/L (ref 136–145)
TCO2 CALC VENOUS: 30 MMOL/L
TOTAL CK: 108 U/L (ref 39–308)

## 2022-05-28 PROCEDURE — 99284 EMERGENCY DEPT VISIT MOD MDM: CPT

## 2022-05-28 PROCEDURE — 82803 BLOOD GASES ANY COMBINATION: CPT

## 2022-05-28 PROCEDURE — 80048 BASIC METABOLIC PNL TOTAL CA: CPT

## 2022-05-28 PROCEDURE — 83605 ASSAY OF LACTIC ACID: CPT

## 2022-05-28 PROCEDURE — 82550 ASSAY OF CK (CPK): CPT

## 2022-05-28 PROCEDURE — 6370000000 HC RX 637 (ALT 250 FOR IP): Performed by: STUDENT IN AN ORGANIZED HEALTH CARE EDUCATION/TRAINING PROGRAM

## 2022-05-28 PROCEDURE — 36415 COLL VENOUS BLD VENIPUNCTURE: CPT

## 2022-05-28 PROCEDURE — 2580000003 HC RX 258: Performed by: STUDENT IN AN ORGANIZED HEALTH CARE EDUCATION/TRAINING PROGRAM

## 2022-05-28 PROCEDURE — 82010 KETONE BODYS QUAN: CPT

## 2022-05-28 RX ORDER — GABAPENTIN 100 MG/1
100 CAPSULE ORAL ONCE
Status: COMPLETED | OUTPATIENT
Start: 2022-05-28 | End: 2022-05-28

## 2022-05-28 RX ORDER — GABAPENTIN 100 MG/1
100 CAPSULE ORAL 3 TIMES DAILY
Qty: 42 CAPSULE | Refills: 0 | Status: SHIPPED | OUTPATIENT
Start: 2022-05-28 | End: 2022-05-28 | Stop reason: SDUPTHER

## 2022-05-28 RX ORDER — GABAPENTIN 100 MG/1
100 CAPSULE ORAL 3 TIMES DAILY
Qty: 42 CAPSULE | Refills: 0 | Status: SHIPPED | OUTPATIENT
Start: 2022-05-28 | End: 2022-06-04

## 2022-05-28 RX ORDER — METHOCARBAMOL 500 MG/1
750 TABLET, FILM COATED ORAL ONCE
Status: COMPLETED | OUTPATIENT
Start: 2022-05-28 | End: 2022-05-28

## 2022-05-28 RX ORDER — SODIUM CHLORIDE, SODIUM LACTATE, POTASSIUM CHLORIDE, AND CALCIUM CHLORIDE .6; .31; .03; .02 G/100ML; G/100ML; G/100ML; G/100ML
1000 INJECTION, SOLUTION INTRAVENOUS ONCE
Status: COMPLETED | OUTPATIENT
Start: 2022-05-28 | End: 2022-05-28

## 2022-05-28 RX ORDER — GABAPENTIN 100 MG/1
100 CAPSULE ORAL 3 TIMES DAILY
Qty: 90 CAPSULE | Refills: 0 | Status: SHIPPED | OUTPATIENT
Start: 2022-05-28 | End: 2022-05-28

## 2022-05-28 RX ADMIN — GABAPENTIN 100 MG: 100 CAPSULE ORAL at 19:31

## 2022-05-28 RX ADMIN — SODIUM CHLORIDE, SODIUM LACTATE, POTASSIUM CHLORIDE, AND CALCIUM CHLORIDE 1000 ML: .6; .31; .03; .02 INJECTION, SOLUTION INTRAVENOUS at 22:02

## 2022-05-28 RX ADMIN — METHOCARBAMOL 750 MG: 500 TABLET ORAL at 21:10

## 2022-05-28 ASSESSMENT — ENCOUNTER SYMPTOMS
CHEST TIGHTNESS: 0
NAUSEA: 0
DIARRHEA: 0
SORE THROAT: 0
SHORTNESS OF BREATH: 0
BACK PAIN: 0
VOMITING: 0
ABDOMINAL PAIN: 0
ABDOMINAL DISTENTION: 0
COUGH: 0
CONSTIPATION: 0

## 2022-05-28 ASSESSMENT — PAIN DESCRIPTION - DESCRIPTORS
DESCRIPTORS: BURNING;PINS AND NEEDLES
DESCRIPTORS: BURNING;PINS AND NEEDLES

## 2022-05-28 ASSESSMENT — PAIN SCALES - GENERAL
PAINLEVEL_OUTOF10: 8
PAINLEVEL_OUTOF10: 5

## 2022-05-28 ASSESSMENT — PAIN DESCRIPTION - LOCATION
LOCATION: GENERALIZED
LOCATION: GENERALIZED

## 2022-05-28 ASSESSMENT — PAIN - FUNCTIONAL ASSESSMENT: PAIN_FUNCTIONAL_ASSESSMENT: 0-10

## 2022-05-28 NOTE — ED PROVIDER NOTES
hematuria, penile swelling, scrotal swelling, testicular pain and urgency. Musculoskeletal: Positive for arthralgias and myalgias. Negative for back pain, gait problem, joint swelling, neck pain and neck stiffness. Skin: Negative for rash. Neurological: Negative for weakness, light-headedness and headaches. Hematological: Negative for adenopathy. Psychiatric/Behavioral: The patient is not nervous/anxious. Past Medical, Surgical, Family, and Social History     He has a past medical history of ADD (attention deficit disorder), Allergic rhinitis, Allergy, Asthma, Biliary dyskinesia, and Gluten-induced enteropathy. He has a past surgical history that includes [place] tooth extraction; Upper gastrointestinal endoscopy; and Cholecystectomy, laparoscopic (12/22/2017). His family history includes Cancer in his maternal grandfather; Diabetes in his paternal grandmother; Heart Disease in his maternal grandfather; High Blood Pressure in his maternal grandfather; High Cholesterol in his maternal grandfather. He reports that he has been smoking cigarettes. He has a 6.00 pack-year smoking history. He has quit using smokeless tobacco. He reports current drug use. Drug: Marijuana Lovena Mems). He reports that he does not drink alcohol.     Medications     Previous Medications    ALBUTEROL SULFATE HFA (PROAIR HFA) 108 (90 BASE) MCG/ACT INHALER    Inhale 2 puffs into the lungs every 6 hours as needed for Wheezing or Shortness of Breath    BUSPIRONE (BUSPAR) 30 MG TABLET    Take 1 tablet by mouth 2 times daily    CALCIUM CARBONATE (TUMS) 500 MG CHEWABLE TABLET    Take 1 tablet by mouth daily as needed for Heartburn    CYCLOBENZAPRINE HCL (FLEXERIL PO)    Take by mouth daily    DULOXETINE (CYMBALTA) 60 MG EXTENDED RELEASE CAPSULE    Take 1 capsule by mouth 2 times daily    NABUMETONE (RELAFEN) 500 MG TABLET    Take 1-2 tablets by mouth 2 times daily as needed for Pain    OXYBUTYNIN (DITROPAN XL) 15 MG EXTENDED RELEASE TABLET    TAKE 1 TABLET BY MOUTH EVERY DAY    TAMSULOSIN (FLOMAX) 0.4 MG CAPSULE    Take 0.4 mg by mouth daily    TRAZODONE (DESYREL) 50 MG TABLET    Take 1 tablet by mouth nightly       Allergies     He is allergic to amoxicillin, ceclor [cefaclor], and gluten meal.    Physical Exam     INITIAL VITALS: BP: (!) 147/98, Temp: 98.4 °F (36.9 °C), Heart Rate: (!) 115, Resp: 18, SpO2: 95 %   Physical Exam  Vitals reviewed. Constitutional:       General: He is not in acute distress. Appearance: Normal appearance. He is not ill-appearing or toxic-appearing. HENT:      Head: Normocephalic and atraumatic. Mouth/Throat:      Mouth: Mucous membranes are moist.   Eyes:      Extraocular Movements: Extraocular movements intact. Pupils: Pupils are equal, round, and reactive to light. Cardiovascular:      Rate and Rhythm: Normal rate and regular rhythm. Pulses: Normal pulses. Heart sounds: Normal heart sounds. Pulmonary:      Effort: Pulmonary effort is normal.      Breath sounds: Normal breath sounds. Abdominal:      Palpations: Abdomen is soft. Tenderness: There is no abdominal tenderness. Genitourinary:     Comments: Patient deferred  Musculoskeletal:      Cervical back: Normal range of motion. No rigidity. Skin:     General: Skin is warm and dry. Neurological:      General: No focal deficit present. Mental Status: He is alert and oriented to person, place, and time. Cranial Nerves: No cranial nerve deficit. Sensory: No sensory deficit. Motor: No weakness. Coordination: Coordination normal.      Gait: Gait normal.      Deep Tendon Reflexes: Reflexes normal.   Psychiatric:         Mood and Affect: Mood normal.         Behavior: Behavior normal.       DiagnosticResults     RADIOLOGY:  No orders to display       LABS:   No results found for this visit on 05/28/22.       RECENT VITALS:  BP: (!) 147/98, Temp: 98.4 °F (36.9 °C), Heart Rate: (!) 115,Resp: 18, SpO2: 95 %       ED Course     Nursing Notes, Past Medical Hx, Past Surgical Hx, Social Hx, Allergies, and Family Hx were reviewed. The patient was given the followingmedications:  Orders Placed This Encounter   Medications    gabapentin (NEURONTIN) capsule 100 mg       CONSULTS:  None    MEDICAL DECISION MAKING / ASSESSMENT / Shaw Cabral is a 32 y.o. male history of fibromyalgia presenting with bilateral arm pain, bilateral leg pain and penile pain. Patient states that this discomfort is similar to what he has experienced in the past with previous fibromyalgia flares, which acutely worsened following his discontinuation of gabapentin 4 days ago. Patient is requesting to resume his gabapentin. Given the worsening nature of his symptoms, screening laboratory studies including a metabolic panel and CK were obtained, which are pending at my time of signout. The patient's care was signed out to Dr. Юлия Ellis, who's responsibility will include:  -Follow-up pending  -Reassessment and disposition    This patient was also evaluated by the attending physician. All care plans werediscussed and agreed upon. Clinical Impression     1. Other chronic pain        Disposition     PATIENT REFERRED TO:  No follow-up provider specified.     DISCHARGE MEDICATIONS:  New Prescriptions    No medications on file       DISPOSITION         Adriana Calhoun MD  Resident  05/28/22 [id number]

## 2022-05-28 NOTE — ED PROVIDER NOTES
gabapentin today. Patient has documented history of fibromyalgia and chronic pain. Laboratory evaluation revealed a normal CK though his BMP did show evidence of an increased anion gap and low bicarb. On repeat evaluation, the patient states that he has been having a diarrheal illness over the last 2 days with significant amounts of watery diarrhea. Patient states that he is tolerating p.o. without difficulty. I suspect that this is likely contributing to his symptoms. Patient without acid-base disturbance on VBG and lactate within normal limits. As result, I believe patient is appropriate for discharge but have recommended he stay well-nourished and well-hydrated. I recommended that he follow-up with his primary care physician to have repeat BMP. Given reassuring physical examination as well as laboratory evaluation, do not believe patient requires any further imaging or laboratory evaluation at this time. Patient without infectious symptoms at this time and thus do not believe requires infectious work-up. At this time, the patient's been deemed appropriate for discharge. He has stable vital signs, robust return precautions, ambulating without assistance. Will place back on gabapentin 100 mg TID and I have given him a 14-day course and recommended that he follow-up with his primary care physician for further management. This patient was also evaluated by the attending physician. All care plans were discussed and agreed upon. Clinical Impression     1.  Other chronic pain        Disposition     PATIENT REFERRED TO:  Chikis Tapia MD  [address]  [address]  [phone]    Schedule an appointment as soon as possible for a visit in 1 week      [facility] Emergency Department  [address] [place] [address]  [phone]  Go to   As needed, If symptoms worsen      DISCHARGE MEDICATIONS:  Current Discharge Medication List      START taking these medications    Details   gabapentin (NEURONTIN) 100 MG capsule Take 1 capsule by mouth 3 times daily for 14 days.  Intended supply: 90 days  Qty: 42 capsule, Refills: 0             DISPOSITION Decision To Discharge 05/28/2022 08:34:54 PM         Nicky Alcantar MD  05/28/22 [id number]

## 2022-05-29 LAB — BETA-HYDROXYBUTYRATE: 0.06 MMOL/L (ref 0–0.27)

## 2022-05-29 NOTE — ED PROVIDER NOTES
ED Attending Attestation Note     Date of evaluation: 5/28/2022    I supervised the care provided by the resident physician, Brandi Lal MD, and Rena Weber MD.     I have seen and examined the patient, agree with the workup, evaluation, management and diagnosis. The care plan has been discussed. My assessment reveals a 32 y.o. male who presents to the ED with a chief complaint of Chronic Pain (fibromyalgia pain is worse today)     41-year-old male patient with past medical history significant for chronic pain and multiple prior ED visits for similar. He is here today presenting for the same reports pain all over including his arms, his legs his groin region his muscles etc.  His pain is not different than previous episodes, and in fact he is here now requesting a prescription for gabapentin. Of note, this patient was found on lab work to have what appeared to be a metabolic acidosis with decreased bicarb at 17 and an anion gap of 19. Once this was discovered we went and spoke with him about any potential toxins or ingestions he could have taken or any acute symptoms that could have caused this, and discovered that the patient has had a 2-day history of diarrhea with 5 episodes today alone, which would account also for the tachycardia that he had on presentation here today and improved after IV fluids. I favor his bicarb losses to be secondary to diarrhea. Discussed the importance of the patient following up with a primary care provider outpatient to manage the long-term pain management plan for this patient it appears that he has been to many different specialists and even has an appointment coming up in July with a rheumatologist, though I do not see any overt rheumatological diagnosis in his history.      ED data including laboratory and imaging studies were reviewed by me and considered in medical decision making, and I agree with the interpretations as documented, with the following exceptions: None    I was present for the following procedures: None      Jose Murray MD  Emergency Medicine  [facility]     Vernon Carnes MD  05/29/22 1342

## 2022-06-04 ENCOUNTER — HOSPITAL ENCOUNTER (EMERGENCY)
Age: 27
Discharge: HOME | End: 2022-06-04
Payer: MEDICARE

## 2022-06-04 VITALS
DIASTOLIC BLOOD PRESSURE: 105 MMHG | SYSTOLIC BLOOD PRESSURE: 134 MMHG | HEART RATE: 82 BPM | RESPIRATION RATE: 18 BRPM | OXYGEN SATURATION: 98 % | TEMPERATURE: 98.9 F

## 2022-06-04 DIAGNOSIS — Z76.0 ENCOUNTER FOR MEDICATION REFILL: Primary | ICD-10-CM

## 2022-06-04 PROCEDURE — 99283 EMERGENCY DEPT VISIT LOW MDM: CPT

## 2022-06-04 PROCEDURE — 6370000000 HC RX 637 (ALT 250 FOR IP): Performed by: PHYSICIAN ASSISTANT

## 2022-06-04 RX ORDER — GABAPENTIN 300 MG/1
300 CAPSULE ORAL ONCE
Status: COMPLETED | OUTPATIENT
Start: 2022-06-04 | End: 2022-06-04

## 2022-06-04 RX ORDER — GABAPENTIN 300 MG/1
300 CAPSULE ORAL 2 TIMES DAILY
Qty: 28 CAPSULE | Refills: 0 | Status: ON HOLD | OUTPATIENT
Start: 2022-06-04 | End: 2022-09-12

## 2022-06-04 RX ADMIN — GABAPENTIN 300 MG: 300 CAPSULE ORAL at 18:17

## 2022-06-04 ASSESSMENT — PAIN DESCRIPTION - LOCATION: LOCATION: ARM;LEG

## 2022-06-04 ASSESSMENT — PAIN SCALES - GENERAL: PAINLEVEL_OUTOF10: 9

## 2022-06-04 ASSESSMENT — PAIN - FUNCTIONAL ASSESSMENT: PAIN_FUNCTIONAL_ASSESSMENT: 0-10

## 2022-06-04 NOTE — ED PROVIDER NOTES
[address] [facility] 71 ENCOUNTER          PHYSICIAN ASSISTANT NOTE       Date of evaluation: 6/4/2022    Chief Complaint     Chronic Pain (ran out of gabapentin)      History of Present Illness     Levy Fournier is a 32 y.o. male who presents with complaints of pain to bilateral lower extremities. Patient has a history of fibromyalgia and chronic pain. He was recently seen on 528 for the same complaints and discharged with a 14-day course of gabapentin 100 mg. Patient reports this dosage was not enough as he used to take 300. Patient reports he has finished this prescription as he was taking them and 300 mg doses. He states his pain is consistent with his chronic pain and unchanged. He denies any fevers, chills, chest pain, shortness of breath, nausea, vomiting, abdominal pain, diarrhea, or constipation. Denies any numbness or tingling. He is able to ambulate. Patient has been unable to follow-up with his PCP since being seen in the emergency department last week. Patient is requesting medication refill. With the exception of the above, there are no aggravating or alleviating factors. Review of Systems     ROS: Pertinent positive and negative findings as documented in the HPI, otherwise all other systems were reviewed and were negative. Past Medical, Surgical, Family, and Social History     He has a past medical history of ADD (attention deficit disorder), Allergic rhinitis, Allergy, Asthma, Biliary dyskinesia, and Gluten-induced enteropathy. He has a past surgical history that includes [place] tooth extraction; Upper gastrointestinal endoscopy; and Cholecystectomy, laparoscopic (12/22/2017). His family history includes Cancer in his maternal grandfather; Diabetes in his paternal grandmother; Heart Disease in his maternal grandfather; High Blood Pressure in his maternal grandfather; High Cholesterol in his maternal grandfather.   He reports that he has been smoking cigarettes. He has a 6.00 pack-year smoking history. He has quit using smokeless tobacco. He reports current drug use. Drug: Marijuana Dauna Other). He reports that he does not drink alcohol. Medications     Discharge Medication List as of 6/4/2022  6:18 PM      CONTINUE these medications which have NOT CHANGED    Details   busPIRone (BUSPAR) 30 MG tablet Take 1 tablet by mouth 2 times dailyHistorical Med      DULoxetine (CYMBALTA) 60 MG extended release capsule Take 1 capsule by mouth 2 times dailyHistorical Med      traZODone (DESYREL) 50 MG tablet Take 1 tablet by mouth nightlyHistorical Med      nabumetone (RELAFEN) 500 MG tablet Take 1-2 tablets by mouth 2 times daily as needed for Pain, Disp-60 tablet, R-0Normal      oxybutynin (DITROPAN XL) 15 MG extended release tablet TAKE 1 TABLET BY MOUTH EVERY DAYHistorical Med      Cyclobenzaprine HCl (FLEXERIL PO) Take by mouth dailyHistorical Med      albuterol sulfate HFA (PROAIR HFA) 108 (90 Base) MCG/ACT inhaler Inhale 2 puffs into the lungs every 6 hours as needed for Wheezing or Shortness of Breath, Disp-1 each, R-2Normal      tamsulosin (FLOMAX) 0.4 MG capsule Take 0.4 mg by mouth dailyHistorical Med      calcium carbonate (TUMS) 500 MG chewable tablet Take 1 tablet by mouth daily as needed for HeartburnHistorical Med             Allergies     He is allergic to amoxicillin, ceclor [cefaclor], and gluten meal.    Physical Exam     INITIAL VITALS: BP: (!) 134/105, Temp: 98.9 °F (37.2 °C), Heart Rate: 82, Resp: 18, SpO2: 98 %    General: 32 y.o. male in no apparent distress, well developed, well nourished, non-toxic appearance. HEENT: Atraumatic, normocephalic. EOMs intact. Neck:  Full range of motion. Chest/pulm: Respiratory rate normal. Speaks in complete sentences, no respiratory distress, lungs CTA bilaterally, no wheezes, rales, rhonchi. Cardiovascular: Heart rate normal. RRR, no murmurs, rubs, gallops appreciated.      Abdomen: No gross distension. Soft, non-tender, non-peritoneal. No suprapubic or CVAT. : Deferred. Musculoskeletal: Ambulates without difficulty. No evident long bone or joint deformity. No lower extremity edema. Full range of motion of bilateral lower extremities without focal tenderness to palpation. Negative Homans bilaterally. Neurovascular intact. Neuro: A&O x 4. Normal speech without dysarthria or aphasia. Moves all extremities spontaneously and symmetrically. Movements normal without ataxia. Skin: Warm, dry. No obvious rashes, petechiae, or purpura. Psych: Appropriate mood and affect, normal interaction. Diagnostic Results     EKG  None performed during today's visit. RADIOLOGY:  No orders to display       LABS:   No results found for this visit on 06/04/22. RECENT VITALS:  BP: (!) 134/105, Temp: 98.9 °F (37.2 °C), Heart Rate: 82, Resp: 18, SpO2: 98 %     Procedures     None    ED Course     Nursing Notes, Past Medical Hx,Past Surgical Hx, Social Hx, Allergies, and Family Hx were reviewed. The patient was given the following medications:  Orders Placed This Encounter   Medications    gabapentin (NEURONTIN) capsule 300 mg    gabapentin (NEURONTIN) 300 MG capsule     Sig: Take 1 capsule by mouth 2 times daily for 14 days. Intended supply: 30 days     Dispense:  28 capsule     Refill:  0       CONSULTS:  None    MEDICAL DECISION MAKING / ASSESSMENT / Aric Nikos is a 32 y.o. male who presents to the emergency department with complaints of bilateral lower extremity pain and requesting medication refill. .  On presentation, patient is well-appearing and in no acute distress. Patient is afebrile with normal VS. On physical exam patient had no focal tenderness to palpation of bilateral lower extremities, evidence of infection, or evidence of injury. Full range of motion with neurovascular intact.   Patient was seen in the department 1 week ago and provided with a 2-week prescription for gabapentin 100 mg. Patient reports he was taking more than 100 mg as his usual dose is 300 mg. Patient has therefore run out of his gabapentin. He had seen his PCP in the past who switched him from gabapentin to another medication but states this was not working. Patient has been unable to follow-up with his PCP since his last ED visit. Patient is denying any other symptoms and I do not feel any work-up is indicated at this time. Patient was given a dose of gabapentin 300 mg and will be discharged with a 2-week prescription for gabapentin 300 mg twice daily. I stressed the importance of following up with PCP as the emergency department is not the appropriate place for medication refills. We discussed appropriate return precautions and the patient expressed understanding. I did provide him with the number for pain management given his chronic pain. At this point in time, patient is stable for discharge. Patient was given strict return precautions as outlined in the AVS. Patient was agreeable and understanding to this plan of care. Prior to discharge, patient was ambulatory and PO tolerant. The patient was seen independently by me, the advanced practice provider. The patient and / or the family were informed of the results of any tests, a time was given to answer questions, a plan was proposed and they agreed with plan. This note was dictated using voice-recognition software, which occasionally leads to inadvertent typographic errors. Clinical Impression     1. Encounter for medication refill        Disposition     PATIENT REFERRED TO:  Richard Baron MD  [address]  [place] [address] [place] [address] GUI Shields., [place].  [address] [place], [place] [address]Suite [facility]  Schedule an appointment as soon as possible for a visit         DISCHARGE MEDICATIONS:  Discharge Medication List as of 6/4/2022  6:18 PM DISPOSITION Decision To Discharge 06/04/2022 06:06:12 PM       Celestina Osulliavn [place]  06/04/22 8803

## 2022-06-15 DIAGNOSIS — G89.4 CHRONIC PAIN SYNDROME: ICD-10-CM

## 2022-06-15 RX ORDER — NABUMETONE 500 MG/1
500-1000 TABLET, FILM COATED ORAL 2 TIMES DAILY PRN
Qty: 120 TABLET | Refills: 0 | Status: SHIPPED | OUTPATIENT
Start: 2022-06-15 | End: 2022-07-20

## 2022-07-20 ENCOUNTER — HOSPITAL ENCOUNTER (EMERGENCY)
Age: 27
Discharge: PSYCHIATRIC OR SUBSTANCE USE FACILITY | End: 2022-07-20
Attending: EMERGENCY MEDICINE
Payer: MEDICARE

## 2022-07-20 ENCOUNTER — APPOINTMENT (OUTPATIENT)
Dept: GENERAL RADIOLOGY | Age: 27
End: 2022-07-20
Payer: MEDICARE

## 2022-07-20 VITALS
OXYGEN SATURATION: 96 % | SYSTOLIC BLOOD PRESSURE: 113 MMHG | DIASTOLIC BLOOD PRESSURE: 71 MMHG | RESPIRATION RATE: 17 BRPM | TEMPERATURE: 99.3 F | BODY MASS INDEX: 19.77 KG/M2 | HEART RATE: 80 BPM | WEIGHT: 130 LBS

## 2022-07-20 DIAGNOSIS — R41.82 ALTERED MENTAL STATUS, UNSPECIFIED ALTERED MENTAL STATUS TYPE: ICD-10-CM

## 2022-07-20 DIAGNOSIS — Z86.59 HISTORY OF POST TRAUMATIC STRESS DISORDER: ICD-10-CM

## 2022-07-20 DIAGNOSIS — F19.10 POLYSUBSTANCE ABUSE (HCC): Primary | ICD-10-CM

## 2022-07-20 LAB
ACETAMINOPHEN LEVEL: <5 UG/ML (ref 10–30)
ALBUMIN SERPL-MCNC: 5.6 G/DL (ref 3.4–5)
ALP BLD-CCNC: 90 U/L (ref 40–129)
ALT SERPL-CCNC: 88 U/L (ref 10–40)
AMPHETAMINE SCREEN, URINE: ABNORMAL
ANION GAP SERPL CALCULATED.3IONS-SCNC: 16 MMOL/L (ref 3–16)
AST SERPL-CCNC: 81 U/L (ref 15–37)
BACTERIA: ABNORMAL /HPF
BARBITURATE SCREEN URINE: ABNORMAL
BASOPHILS ABSOLUTE: 0.1 K/UL (ref 0–0.2)
BASOPHILS RELATIVE PERCENT: 0.8 %
BENZODIAZEPINE SCREEN, URINE: POSITIVE
BILIRUB SERPL-MCNC: 0.7 MG/DL (ref 0–1)
BILIRUBIN DIRECT: <0.2 MG/DL (ref 0–0.3)
BILIRUBIN URINE: ABNORMAL
BILIRUBIN, INDIRECT: ABNORMAL MG/DL (ref 0–1)
BLOOD, URINE: NEGATIVE
BUN BLDV-MCNC: 22 MG/DL (ref 7–20)
CALCIUM SERPL-MCNC: 10 MG/DL (ref 8.3–10.6)
CANNABINOID SCREEN URINE: POSITIVE
CHLORIDE BLD-SCNC: 92 MMOL/L (ref 99–110)
CLARITY: CLEAR
CO2: 28 MMOL/L (ref 21–32)
COCAINE METABOLITE SCREEN URINE: POSITIVE
COLOR: YELLOW
CREAT SERPL-MCNC: 0.8 MG/DL (ref 0.9–1.3)
CRYSTALS, UA: ABNORMAL /HPF
EKG ATRIAL RATE: 79 BPM
EKG DIAGNOSIS: NORMAL
EKG P AXIS: 75 DEGREES
EKG P-R INTERVAL: 162 MS
EKG Q-T INTERVAL: 388 MS
EKG QRS DURATION: 102 MS
EKG QTC CALCULATION (BAZETT): 444 MS
EKG R AXIS: 78 DEGREES
EKG T AXIS: 67 DEGREES
EKG VENTRICULAR RATE: 79 BPM
EOSINOPHILS ABSOLUTE: 0.1 K/UL (ref 0–0.6)
EOSINOPHILS RELATIVE PERCENT: 0.9 %
EPITHELIAL CELLS, UA: ABNORMAL /HPF (ref 0–5)
ETHANOL: NORMAL MG/DL (ref 0–0.08)
GFR AFRICAN AMERICAN: >60
GFR NON-AFRICAN AMERICAN: >60
GLUCOSE BLD-MCNC: 99 MG/DL (ref 70–99)
GLUCOSE URINE: NEGATIVE MG/DL
HCT VFR BLD CALC: 48.1 % (ref 40.5–52.5)
HEMOGLOBIN: 17 G/DL (ref 13.5–17.5)
KETONES, URINE: >=80 MG/DL
LEUKOCYTE ESTERASE, URINE: NEGATIVE
LIPASE: 21 U/L (ref 13–60)
LYMPHOCYTES ABSOLUTE: 0.6 K/UL (ref 1–5.1)
LYMPHOCYTES RELATIVE PERCENT: 6.8 %
Lab: ABNORMAL
MCH RBC QN AUTO: 30.4 PG (ref 26–34)
MCHC RBC AUTO-ENTMCNC: 35.2 G/DL (ref 31–36)
MCV RBC AUTO: 86.3 FL (ref 80–100)
METHADONE SCREEN, URINE: ABNORMAL
MICROSCOPIC EXAMINATION: YES
MONOCYTES ABSOLUTE: 0.6 K/UL (ref 0–1.3)
MONOCYTES RELATIVE PERCENT: 6.4 %
MUCUS: ABNORMAL /LPF
NEUTROPHILS ABSOLUTE: 7.8 K/UL (ref 1.7–7.7)
NEUTROPHILS RELATIVE PERCENT: 85.1 %
NITRITE, URINE: NEGATIVE
OPIATE SCREEN URINE: ABNORMAL
OXYCODONE URINE: ABNORMAL
PDW BLD-RTO: 13.2 % (ref 12.4–15.4)
PH UA: 6
PH UA: 6 (ref 5–8)
PHENCYCLIDINE SCREEN URINE: ABNORMAL
PLATELET # BLD: 293 K/UL (ref 135–450)
PMV BLD AUTO: 7.7 FL (ref 5–10.5)
POTASSIUM REFLEX MAGNESIUM: 4 MMOL/L (ref 3.5–5.1)
PROPOXYPHENE SCREEN: ABNORMAL
PROTEIN UA: ABNORMAL MG/DL
RBC # BLD: 5.58 M/UL (ref 4.2–5.9)
RBC UA: ABNORMAL /HPF (ref 0–4)
SALICYLATE, SERUM: <0.3 MG/DL (ref 15–30)
SARS-COV-2, NAAT: NOT DETECTED
SODIUM BLD-SCNC: 136 MMOL/L (ref 136–145)
SPECIFIC GRAVITY UA: >=1.03 (ref 1–1.03)
TOTAL PROTEIN: 8 G/DL (ref 6.4–8.2)
URINE TYPE: ABNORMAL
UROBILINOGEN, URINE: 0.2 E.U./DL
WBC # BLD: 9.2 K/UL (ref 4–11)
WBC UA: ABNORMAL /HPF (ref 0–5)

## 2022-07-20 PROCEDURE — 99285 EMERGENCY DEPT VISIT HI MDM: CPT

## 2022-07-20 PROCEDURE — 96372 THER/PROPH/DIAG INJ SC/IM: CPT

## 2022-07-20 PROCEDURE — 93005 ELECTROCARDIOGRAM TRACING: CPT | Performed by: PHYSICIAN ASSISTANT

## 2022-07-20 PROCEDURE — 80143 DRUG ASSAY ACETAMINOPHEN: CPT

## 2022-07-20 PROCEDURE — 83690 ASSAY OF LIPASE: CPT

## 2022-07-20 PROCEDURE — 81001 URINALYSIS AUTO W/SCOPE: CPT

## 2022-07-20 PROCEDURE — 2580000003 HC RX 258: Performed by: PHYSICIAN ASSISTANT

## 2022-07-20 PROCEDURE — 80307 DRUG TEST PRSMV CHEM ANLYZR: CPT

## 2022-07-20 PROCEDURE — 80179 DRUG ASSAY SALICYLATE: CPT

## 2022-07-20 PROCEDURE — 82077 ASSAY SPEC XCP UR&BREATH IA: CPT

## 2022-07-20 PROCEDURE — 85025 COMPLETE CBC W/AUTO DIFF WBC: CPT

## 2022-07-20 PROCEDURE — 80048 BASIC METABOLIC PNL TOTAL CA: CPT

## 2022-07-20 PROCEDURE — 71046 X-RAY EXAM CHEST 2 VIEWS: CPT

## 2022-07-20 PROCEDURE — 6360000002 HC RX W HCPCS: Performed by: PHYSICIAN ASSISTANT

## 2022-07-20 PROCEDURE — 6370000000 HC RX 637 (ALT 250 FOR IP): Performed by: PHYSICIAN ASSISTANT

## 2022-07-20 PROCEDURE — 87635 SARS-COV-2 COVID-19 AMP PRB: CPT

## 2022-07-20 PROCEDURE — 80076 HEPATIC FUNCTION PANEL: CPT

## 2022-07-20 RX ORDER — HYDROXYZINE HYDROCHLORIDE 25 MG/1
25 TABLET, FILM COATED ORAL 3 TIMES DAILY PRN
Status: DISCONTINUED | OUTPATIENT
Start: 2022-07-20 | End: 2022-07-20 | Stop reason: HOSPADM

## 2022-07-20 RX ORDER — TAMSULOSIN HYDROCHLORIDE 0.4 MG/1
0.4 CAPSULE ORAL DAILY
Status: ON HOLD | COMMUNITY
End: 2022-09-16

## 2022-07-20 RX ORDER — GABAPENTIN 300 MG/1
300 CAPSULE ORAL 2 TIMES DAILY
Status: DISCONTINUED | OUTPATIENT
Start: 2022-07-20 | End: 2022-07-20 | Stop reason: HOSPADM

## 2022-07-20 RX ORDER — BACLOFEN 20 MG/1
1 TABLET ORAL 3 TIMES DAILY
COMMUNITY
Start: 2022-07-05 | End: 2022-07-20

## 2022-07-20 RX ORDER — CYCLOBENZAPRINE HCL 10 MG
10 TABLET ORAL 3 TIMES DAILY PRN
Status: DISCONTINUED | OUTPATIENT
Start: 2022-07-20 | End: 2022-07-20 | Stop reason: HOSPADM

## 2022-07-20 RX ORDER — DULOXETIN HYDROCHLORIDE 30 MG/1
30 CAPSULE, DELAYED RELEASE ORAL 2 TIMES DAILY
Status: DISCONTINUED | OUTPATIENT
Start: 2022-07-20 | End: 2022-07-20 | Stop reason: HOSPADM

## 2022-07-20 RX ORDER — DEXTROSE AND SODIUM CHLORIDE 5; .9 G/100ML; G/100ML
1000 INJECTION, SOLUTION INTRAVENOUS ONCE
Status: COMPLETED | OUTPATIENT
Start: 2022-07-20 | End: 2022-07-20

## 2022-07-20 RX ORDER — ONDANSETRON 2 MG/ML
4 INJECTION INTRAMUSCULAR; INTRAVENOUS ONCE
Status: COMPLETED | OUTPATIENT
Start: 2022-07-20 | End: 2022-07-20

## 2022-07-20 RX ORDER — DULOXETIN HYDROCHLORIDE 60 MG/1
CAPSULE, DELAYED RELEASE ORAL
Status: ON HOLD | COMMUNITY
Start: 2022-05-30 | End: 2022-09-16 | Stop reason: HOSPADM

## 2022-07-20 RX ORDER — BUSPIRONE HYDROCHLORIDE 10 MG/1
30 TABLET ORAL 2 TIMES DAILY
Status: DISCONTINUED | OUTPATIENT
Start: 2022-07-20 | End: 2022-07-20 | Stop reason: HOSPADM

## 2022-07-20 RX ADMIN — DULOXETINE HYDROCHLORIDE 30 MG: 30 CAPSULE, DELAYED RELEASE ORAL at 15:50

## 2022-07-20 RX ADMIN — CYCLOBENZAPRINE 10 MG: 10 TABLET, FILM COATED ORAL at 15:50

## 2022-07-20 RX ADMIN — DEXTROSE AND SODIUM CHLORIDE 1000 ML: 5; 900 INJECTION, SOLUTION INTRAVENOUS at 18:19

## 2022-07-20 RX ADMIN — ONDANSETRON 4 MG: 2 INJECTION INTRAMUSCULAR; INTRAVENOUS at 11:18

## 2022-07-20 RX ADMIN — HYDROXYZINE HYDROCHLORIDE 25 MG: 25 TABLET ORAL at 17:01

## 2022-07-20 RX ADMIN — GABAPENTIN 300 MG: 300 CAPSULE ORAL at 15:50

## 2022-07-20 RX ADMIN — BUSPIRONE HYDROCHLORIDE 30 MG: 10 TABLET ORAL at 15:49

## 2022-07-20 ASSESSMENT — ENCOUNTER SYMPTOMS
DIARRHEA: 0
VOMITING: 1
COUGH: 0
CONSTIPATION: 0
SHORTNESS OF BREATH: 0
ABDOMINAL PAIN: 1
NAUSEA: 1
EYES NEGATIVE: 1
ABDOMINAL DISTENTION: 0

## 2022-07-20 ASSESSMENT — PAIN - FUNCTIONAL ASSESSMENT: PAIN_FUNCTIONAL_ASSESSMENT: NONE - DENIES PAIN

## 2022-07-20 NOTE — ED NOTES
Pt now pleasant cooperative apologized for how he acted upon arrival water to pt     Minh Gomez RN  07/20/22 9549

## 2022-07-20 NOTE — ED PROVIDER NOTES
[address] [facility] 71 ENCOUNTER          PHYSICIAN ASSISTANT NOTE       Date of evaluation: 7/20/2022    Chief Complaint     Drug Overdose (Pt states I took the wrong med when asked what he took says fentanyl when asked how much he says too much)    History of Present Illness     Khloe Thomas is a 32 y.o. male who presents to the emergency department with a chief complaint of drug overdose. The patient states that he took an oxycodone this morning which was laced with fentanyl. When asked how long he has been taking drugs, he says this past month he has been taking a lot of Percocet. The patient states that he currently is having pain in his upper abdomen. Otherwise is not able to give much more information. He is actively vomiting on my examination. Unclear if squad gave the patient Narcan as no formal report given to the nurses or myself, squad unable to obtain initial vitals given the patients agitation. Apparently, the patient has been staying at his friend's house since being released from [facility] for drug rehab on 7/5/2022. Review of Systems     Review of Systems   Constitutional:  Negative for chills, diaphoresis and fever. HENT: Negative. Eyes: Negative. Respiratory:  Negative for cough and shortness of breath. Cardiovascular:  Negative for chest pain and palpitations. Gastrointestinal:  Positive for abdominal pain, nausea and vomiting. Negative for abdominal distention, constipation and diarrhea. Genitourinary:  Negative for difficulty urinating, dysuria, frequency, penile pain and urgency. Musculoskeletal: Negative. Skin: Negative. Negative for wound. Neurological:  Negative for dizziness and headaches. Hematological:  Negative for adenopathy. Psychiatric/Behavioral: Negative. All other systems reviewed and are negative.     Past Medical, Surgical, Family, and Social History     He has a past medical history of ADD (attention deficit disorder), Allergic rhinitis, Allergy, Amphetamine abuse ([address] Asthma, Benzodiazepine abuse ([facility] [address] Biliary dyskinesia, GERD (gastroesophageal reflux disease), Gluten-induced enteropathy, Incomplete bladder emptying, Insomnia, Neuropathy, Nicotine dependence, Oppositional defiant disorder, Pain syndrome, chronic, Polysubstance abuse ([facility] [address] and Vitamin D deficiency. He has a past surgical history that includes [place] tooth extraction; Upper gastrointestinal endoscopy; and Cholecystectomy, laparoscopic (12/22/2017). His family history includes Cancer in his maternal grandfather; Diabetes in his paternal grandmother; Heart Disease in his maternal grandfather; High Blood Pressure in his maternal grandfather; High Cholesterol in his maternal grandfather. He reports that he has been smoking cigarettes. He has a 6.00 pack-year smoking history. He has quit using smokeless tobacco. He reports current drug use. Drug: Marijuana [place] Mustard). He reports that he does not drink alcohol. Medications     Discharge Medication List as of 7/20/2022  8:31 PM        CONTINUE these medications which have NOT CHANGED    Details   tamsulosin (FLOMAX) 0.4 MG capsule Take 0.4 mg by mouth in the morning. Historical Med      !! DULoxetine (CYMBALTA) 30 MG extended release capsule TAKE 1 CAPSULE BY MOUTH DAILY WITH THE 60MG CAPSULESHistorical Med      gabapentin (NEURONTIN) 300 MG capsule Take 1 capsule by mouth 2 times daily for 14 days. Intended supply: 30 days, Disp-28 capsule, R-0Print      busPIRone (BUSPAR) 30 MG tablet Take 1 tablet by mouth 2 times dailyHistorical Med      !!  DULoxetine (CYMBALTA) 60 MG extended release capsule Take 1 capsule by mouth 2 times dailyHistorical Med      traZODone (DESYREL) 50 MG tablet Take 1 tablet by mouth nightlyHistorical Med      oxybutynin (DITROPAN XL) 15 MG extended release tablet TAKE 1 TABLET BY MOUTH EVERY DAYHistorical Med      albuterol sulfate HFA (PROAIR HFA) 108 (90 Base) MCG/ACT inhaler Inhale 2 puffs into the lungs every 6 hours as needed for Wheezing or Shortness of Breath, Disp-1 each, R-2Normal      calcium carbonate (TUMS) 500 MG chewable tablet Take 1 tablet by mouth daily as needed for HeartburnHistorical Med       !! - Potential duplicate medications found. Please discuss with provider. Allergies     He is allergic to amoxicillin, ceclor [cefaclor], and gluten meal.    Physical Exam     INITIAL VITALS: BP: (!) 141/99, Temp: 99.3 °F (37.4 °C), Heart Rate: (!) 117, Resp: 18, SpO2: 100 %  Physical Exam  Vitals and nursing note reviewed. Constitutional:       General: He is not in acute distress. Appearance: He is well-developed. He is ill-appearing. He is not diaphoretic. Comments: Very disheveled appearance. Actively vomiting on my exam.  Writhing around in hospital bed. Will not allow vitals to be taken. HENT:      Head: Normocephalic and atraumatic. Nose: Nose normal.      Mouth/Throat:      Mouth: Mucous membranes are moist.      Pharynx: Oropharynx is clear. Eyes:      Conjunctiva/sclera: Conjunctivae normal.      Pupils: Pupils are equal, round, and reactive to light. Cardiovascular:      Rate and Rhythm: Regular rhythm. Tachycardia present. Heart sounds: Normal heart sounds. No murmur heard. Comments: Tachycardic to 117 bpm  Pulmonary:      Effort: Pulmonary effort is normal.      Breath sounds: Normal breath sounds. No wheezing, rhonchi or rales. Abdominal:      General: Abdomen is flat. Bowel sounds are normal. There is no distension. Palpations: Abdomen is soft. Tenderness: There is no abdominal tenderness. Musculoskeletal:         General: Normal range of motion. Cervical back: Normal range of motion and neck supple. Comments: Patient is writhing around in the hospital bed gripping his legs and arms. Lymphadenopathy:      Cervical: No cervical adenopathy. Skin:     General: Skin is warm and dry. Capillary Refill: Capillary refill takes less than 2 seconds. Coloration: Skin is not pale. Findings: No rash. Neurological:      General: No focal deficit present. Mental Status: He is alert. Comments: Difficult to assess neurologic examination given the patient's current state. Psychiatric:         Mood and Affect: Mood normal.         Behavior: Behavior normal.       Diagnostic Results     EKG   Interpreted in conjunction with emergency department physician Rafiq Camejo MD  Normal sinus rhythm with no acute pathology concerning for infarction or ischemia. RADIOLOGY:  XR CHEST (2 VW)   Final Result      Clear lungs. Normal cardiomediastinal silhouette.         LABS:   Results for orders placed or performed during the hospital encounter of 07/20/22   COVID-19, Rapid    Specimen: Nasopharyngeal Swab   Result Value Ref Range    SARS-CoV-2, NAAT Not Detected Not Detected   CBC with Auto Differential   Result Value Ref Range    WBC 9.2 4.0 - 11.0 K/uL    RBC 5.58 4.20 - 5.90 M/uL    Hemoglobin 17.0 13.5 - 17.5 g/dL    Hematocrit 48.1 40.5 - 52.5 %    MCV 86.3 80.0 - 100.0 fL    MCH 30.4 26.0 - 34.0 pg    MCHC 35.2 31.0 - 36.0 g/dL    RDW 13.2 12.4 - 15.4 %    Platelets 054 788 - 444 K/uL    MPV 7.7 5.0 - 10.5 fL    Neutrophils % 85.1 %    Lymphocytes % 6.8 %    Monocytes % 6.4 %    Eosinophils % 0.9 %    Basophils % 0.8 %    Neutrophils Absolute 7.8 (H) 1.7 - 7.7 K/uL    Lymphocytes Absolute 0.6 (L) 1.0 - 5.1 K/uL    Monocytes Absolute 0.6 0.0 - 1.3 K/uL    Eosinophils Absolute 0.1 0.0 - 0.6 K/uL    Basophils Absolute 0.1 0.0 - 0.2 K/uL   Basic Metabolic Panel w/ Reflex to MG   Result Value Ref Range    Sodium 136 136 - 145 mmol/L    Potassium reflex Magnesium 4.0 3.5 - 5.1 mmol/L    Chloride 92 (L) 99 - 110 mmol/L    CO2 28 21 - 32 mmol/L    Anion Gap 16 3 - 16    Glucose 99 70 - 99 mg/dL    BUN 22 (H) 7 - 20 mg/dL    Creatinine 0.8 (L) 0.9 - 1.3 mg/dL    GFR Non- American >60 >60    GFR African American >60 >60    Calcium 10.0 8.3 - 10.6 mg/dL   Urine Drug Screen   Result Value Ref Range    Amphetamine Screen, Urine Neg Negative <1000ng/mL    Barbiturate Screen, Ur Neg Negative <200 ng/mL    Benzodiazepine Screen, Urine POSITIVE (A) Negative <200 ng/mL    Cannabinoid Scrn, Ur POSITIVE (A) Negative <50 ng/mL    Cocaine Metabolite Screen, Urine POSITIVE (A) Negative <300 ng/mL    Opiate Scrn, Ur Neg Negative <300 ng/mL    PCP Screen, Urine Neg Negative <25 ng/mL    Methadone Screen, Urine Neg Negative <300 ng/mL    Propoxyphene Scrn, Ur Neg Negative <300 ng/mL    Oxycodone Urine Neg Negative <100 ng/ml    pH, UA 6.0     Drug Screen Comment: see below    Ethanol   Result Value Ref Range    Ethanol Lvl None Detected mg/dL   Urinalysis   Result Value Ref Range    Color, UA Yellow Straw/Yellow    Clarity, UA Clear Clear    Glucose, Ur Negative Negative mg/dL    Bilirubin Urine MODERATE (A) Negative    Ketones, Urine >=80 (A) Negative mg/dL    Specific Gravity, UA >=1.030 1.005 - 1.030    Blood, Urine Negative Negative    pH, UA 6.0 5.0 - 8.0    Protein, UA TRACE (A) Negative mg/dL    Urobilinogen, Urine 0.2 <2.0 E.U./dL    Nitrite, Urine Negative Negative    Leukocyte Esterase, Urine Negative Negative    Microscopic Examination YES     Urine Type NotGiven    Hepatic Function Panel   Result Value Ref Range    Total Protein 8.0 6.4 - 8.2 g/dL    Albumin 5.6 (H) 3.4 - 5.0 g/dL    Alkaline Phosphatase 90 40 - 129 U/L    ALT 88 (H) 10 - 40 U/L    AST 81 (H) 15 - 37 U/L    Total Bilirubin 0.7 0.0 - 1.0 mg/dL    Bilirubin, Direct <0.2 0.0 - 0.3 mg/dL    Bilirubin, Indirect see below 0.0 - 1.0 mg/dL   Microscopic Urinalysis   Result Value Ref Range    Mucus, UA 1+ (A) None Seen /LPF    WBC, UA 0-2 0 - 5 /HPF    RBC, UA None seen 0 - 4 /HPF    Epithelial Cells, UA 0-1 0 - 5 /HPF    Bacteria, UA 1+ (A) None Seen /HPF    Crystals, UA 1+ Uric Acid (A) None Seen /HPF   Lipase   Result Value Ref Range    Lipase 21.0 72.0 - 15.0 U/L   Salicylate   Result Value Ref Range    Salicylate, Serum <0.7 (L) 15.0 - 30.0 mg/dL   Acetaminophen Level   Result Value Ref Range    Acetaminophen Level <5 (L) 10 - 30 ug/mL   EKG 12 Lead   Result Value Ref Range    Ventricular Rate 79 BPM    Atrial Rate 79 BPM    P-R Interval 162 ms    QRS Duration 102 ms    Q-T Interval 388 ms    QTc Calculation (Bazett) 444 ms    P Axis 75 degrees    R Axis 78 degrees    T Axis 67 degrees    Diagnosis       EKG performed in ER and to be interpreted by ER physician. Confirmed by MD, ER ([id number]),  Yahaira Deng ([id number]) on 7/20/2022 1:46:09 PM       ED BEDSIDE ULTRASOUND:  No results found. RECENT VITALS:  BP: 113/71, Temp: 99.3 °F (37.4 °C), Heart Rate: 80, Resp: 17, SpO2: 96 %     Procedures     None    ED Course     Nursing Notes, Past Medical Hx,Past Surgical Hx, Social Hx, Allergies, and Family Hx were reviewed. The patient was given the following medications:  Orders Placed This Encounter   Medications    ondansetron (ZOFRAN) injection 4 mg    DISCONTD: gabapentin (NEURONTIN) capsule 300 mg    DISCONTD: busPIRone (BUSPAR) tablet 30 mg    DISCONTD: DULoxetine (CYMBALTA) extended release capsule 30 mg    DISCONTD: cyclobenzaprine (FLEXERIL) tablet 10 mg    DISCONTD: hydrOXYzine HCl (ATARAX) tablet 25 mg    dextrose 5 % and 0.9 % sodium chloride infusion     CONSULTS:  IP CONSULT TO CASE [place] / DAVIS / Zoraida Jai is a 32 y.o. male who presents emergency department with drug overdose. On my exam the patient is initially writhing around in the bed in the fetal position. He tells me that he took some Percocet and believes that it was laced with fentanyl. The patient states that for the past month he has been taking Percocet and living in a friend's basement.   Initially when he arrived he was not cooperating with nursing staff and so vitals were delayed however initially of 92, BUN at 22 and a creatinine of 1.8 otherwise unremarkable. Transaminases are mildly elevated at 81 and 88 for AST and ALT respectively. Ethanol undetectable. Urine drug screen is positive for benzos, marijuana and cocaine. Urinalysis shows 80 ketones, trace protein and uric acid crystals. At this point in time I will be going off service and handing over this patient's care to my colleague Yoon Brennan PA-C. She will be responsible for following up on appropriate transportation for this patient, reassessment, disposition safely. This patient will be cared for in the emergency department until a decision is made on his disposition. This patient was also evaluated by the attending physician. All care plans were discussed and agreed upon. Clinical Impression     1. Polysubstance abuse ([address]    2. Altered mental status, unspecified altered mental status type    3.  History of post traumatic stress disorder      Disposition     pending      DISPOSITION Decision To Transfer 07/20/2022 07:22:08 PM     Stefanie Negro PA-C  07/21/22 1127

## 2022-07-20 NOTE — ED NOTES
Attempt report x5 for [facility],  given call back number to [phone] [facility] ED to pass onto charge RN. Pending return call.       Damon Navarrete RN  07/20/22 5998
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
## 2022-07-20 NOTE — ED NOTES
Attempt report to [facility] x3, unable to reach staff continue to speak to  and voicemail.        Damon Navarrete RN  07/20/22 4992

## 2022-07-20 NOTE — ED NOTES
Received report from [facility]. Assumed care of patient at this time.       Tere Madera RN  07/20/22 0523

## 2022-07-20 NOTE — ED NOTES
Sleeping at this time refusing to leave any monitor/BP/SaO2 attached to him     Harmony Ro, ROLY  07/20/22 [address] [facility] [address], RN  07/20/22 1194

## 2022-07-20 NOTE — ED NOTES
Patient resting with eyes closed comfortably at this time. No current needs.       Dean Combs RN  07/20/22 2820

## 2022-07-20 NOTE — ED NOTES
Pt uncooperative at triage using profanity refusing to allow us to takes his vitals     Erwin Lewis RN  07/20/22 0038
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
## 2022-09-11 ENCOUNTER — HOSPITAL ENCOUNTER (EMERGENCY)
Age: 27
Discharge: PSYCHIATRIC OR SUBSTANCE USE FACILITY | End: 2022-09-11
Attending: STUDENT IN AN ORGANIZED HEALTH CARE EDUCATION/TRAINING PROGRAM
Payer: MEDICARE

## 2022-09-11 ENCOUNTER — HOSPITAL ENCOUNTER (INPATIENT)
Age: 27
LOS: 5 days | Discharge: HOME | DRG: 751 | End: 2022-09-16
Attending: PSYCHIATRY & NEUROLOGY | Admitting: PSYCHIATRY & NEUROLOGY
Payer: MEDICARE

## 2022-09-11 ENCOUNTER — APPOINTMENT (OUTPATIENT)
Dept: GENERAL RADIOLOGY | Age: 27
End: 2022-09-11
Payer: MEDICARE

## 2022-09-11 VITALS
HEART RATE: 57 BPM | BODY MASS INDEX: 19.01 KG/M2 | SYSTOLIC BLOOD PRESSURE: 99 MMHG | OXYGEN SATURATION: 95 % | WEIGHT: 125 LBS | DIASTOLIC BLOOD PRESSURE: 68 MMHG | TEMPERATURE: 99.7 F | RESPIRATION RATE: 11 BRPM

## 2022-09-11 DIAGNOSIS — F11.20 OPIOID USE DISORDER, SEVERE, IN CONTROLLED ENVIRONMENT (HCC): Primary | ICD-10-CM

## 2022-09-11 DIAGNOSIS — F22 PARANOIA (PSYCHOSIS) (HCC): Primary | ICD-10-CM

## 2022-09-11 LAB
A/G RATIO: 2.6 (ref 1.1–2.2)
ACETAMINOPHEN LEVEL: <5 UG/ML (ref 10–30)
ALBUMIN SERPL-MCNC: 5 G/DL (ref 3.4–5)
ALP BLD-CCNC: 72 U/L (ref 40–129)
ALT SERPL-CCNC: 10 U/L (ref 10–40)
AMPHETAMINE SCREEN, URINE: ABNORMAL
ANION GAP SERPL CALCULATED.3IONS-SCNC: 14 MMOL/L (ref 3–16)
AST SERPL-CCNC: 15 U/L (ref 15–37)
BACTERIA: ABNORMAL /HPF
BARBITURATE SCREEN URINE: ABNORMAL
BASOPHILS ABSOLUTE: 0.1 K/UL (ref 0–0.2)
BASOPHILS RELATIVE PERCENT: 0.8 %
BENZODIAZEPINE SCREEN, URINE: ABNORMAL
BILIRUB SERPL-MCNC: 0.4 MG/DL (ref 0–1)
BILIRUBIN URINE: NEGATIVE
BLOOD, URINE: ABNORMAL
BUN BLDV-MCNC: 14 MG/DL (ref 7–20)
CALCIUM SERPL-MCNC: 9.2 MG/DL (ref 8.3–10.6)
CANNABINOID SCREEN URINE: POSITIVE
CHLORIDE BLD-SCNC: 94 MMOL/L (ref 99–110)
CLARITY: CLEAR
CO2: 28 MMOL/L (ref 21–32)
COCAINE METABOLITE SCREEN URINE: ABNORMAL
COLOR: YELLOW
CREAT SERPL-MCNC: 0.9 MG/DL (ref 0.9–1.3)
EOSINOPHILS ABSOLUTE: 0.1 K/UL (ref 0–0.6)
EOSINOPHILS RELATIVE PERCENT: 1.5 %
EPITHELIAL CELLS, UA: ABNORMAL /HPF (ref 0–5)
ETHANOL: NORMAL MG/DL (ref 0–0.08)
FENTANYL SCREEN, URINE: ABNORMAL
GFR AFRICAN AMERICAN: >60
GFR NON-AFRICAN AMERICAN: >60
GLUCOSE BLD-MCNC: 118 MG/DL (ref 70–99)
GLUCOSE URINE: NEGATIVE MG/DL
HCT VFR BLD CALC: 41.6 % (ref 40.5–52.5)
HEMOGLOBIN: 14.7 G/DL (ref 13.5–17.5)
KETONES, URINE: ABNORMAL MG/DL
LEUKOCYTE ESTERASE, URINE: ABNORMAL
LYMPHOCYTES ABSOLUTE: 0.9 K/UL (ref 1–5.1)
LYMPHOCYTES RELATIVE PERCENT: 11.7 %
Lab: ABNORMAL
MAGNESIUM: 2.1 MG/DL (ref 1.8–2.4)
MCH RBC QN AUTO: 29.9 PG (ref 26–34)
MCHC RBC AUTO-ENTMCNC: 35.4 G/DL (ref 31–36)
MCV RBC AUTO: 84.6 FL (ref 80–100)
METHADONE SCREEN, URINE: ABNORMAL
MICROSCOPIC EXAMINATION: YES
MONOCYTES ABSOLUTE: 0.9 K/UL (ref 0–1.3)
MONOCYTES RELATIVE PERCENT: 11.1 %
MUCUS: ABNORMAL /LPF
NEUTROPHILS ABSOLUTE: 5.8 K/UL (ref 1.7–7.7)
NEUTROPHILS RELATIVE PERCENT: 74.9 %
NITRITE, URINE: NEGATIVE
OPIATE SCREEN URINE: ABNORMAL
OXYCODONE URINE: ABNORMAL
PDW BLD-RTO: 12.7 % (ref 12.4–15.4)
PH UA: 6.5
PH UA: 6.5 (ref 5–8)
PHENCYCLIDINE SCREEN URINE: ABNORMAL
PLATELET # BLD: 302 K/UL (ref 135–450)
PMV BLD AUTO: 7.5 FL (ref 5–10.5)
POTASSIUM REFLEX MAGNESIUM: 3.5 MMOL/L (ref 3.5–5.1)
PROTEIN UA: NEGATIVE MG/DL
RBC # BLD: 4.92 M/UL (ref 4.2–5.9)
RBC UA: ABNORMAL /HPF (ref 0–4)
RENAL EPITHELIAL, UA: ABNORMAL /HPF (ref 0–1)
SALICYLATE, SERUM: <0.3 MG/DL (ref 15–30)
SARS-COV-2, NAAT: NOT DETECTED
SODIUM BLD-SCNC: 136 MMOL/L (ref 136–145)
SPECIFIC GRAVITY UA: <=1.005 (ref 1–1.03)
TOTAL PROTEIN: 6.9 G/DL (ref 6.4–8.2)
TROPONIN: <0.01 NG/ML
URINE REFLEX TO CULTURE: YES
URINE TYPE: ABNORMAL
UROBILINOGEN, URINE: 0.2 E.U./DL
WBC # BLD: 7.8 K/UL (ref 4–11)
WBC UA: ABNORMAL /HPF (ref 0–5)

## 2022-09-11 PROCEDURE — 84484 ASSAY OF TROPONIN QUANT: CPT

## 2022-09-11 PROCEDURE — 87635 SARS-COV-2 COVID-19 AMP PRB: CPT

## 2022-09-11 PROCEDURE — 82077 ASSAY SPEC XCP UR&BREATH IA: CPT

## 2022-09-11 PROCEDURE — 80307 DRUG TEST PRSMV CHEM ANLYZR: CPT

## 2022-09-11 PROCEDURE — 6370000000 HC RX 637 (ALT 250 FOR IP): Performed by: STUDENT IN AN ORGANIZED HEALTH CARE EDUCATION/TRAINING PROGRAM

## 2022-09-11 PROCEDURE — 80143 DRUG ASSAY ACETAMINOPHEN: CPT

## 2022-09-11 PROCEDURE — 1240000000 HC EMOTIONAL WELLNESS R&B

## 2022-09-11 PROCEDURE — 2580000003 HC RX 258

## 2022-09-11 PROCEDURE — 87086 URINE CULTURE/COLONY COUNT: CPT

## 2022-09-11 PROCEDURE — 80053 COMPREHEN METABOLIC PANEL: CPT

## 2022-09-11 PROCEDURE — 80179 DRUG ASSAY SALICYLATE: CPT

## 2022-09-11 PROCEDURE — 83735 ASSAY OF MAGNESIUM: CPT

## 2022-09-11 PROCEDURE — 99285 EMERGENCY DEPT VISIT HI MDM: CPT

## 2022-09-11 PROCEDURE — 81001 URINALYSIS AUTO W/SCOPE: CPT

## 2022-09-11 PROCEDURE — 71045 X-RAY EXAM CHEST 1 VIEW: CPT

## 2022-09-11 PROCEDURE — 93005 ELECTROCARDIOGRAM TRACING: CPT | Performed by: EMERGENCY MEDICINE

## 2022-09-11 PROCEDURE — 85025 COMPLETE CBC W/AUTO DIFF WBC: CPT

## 2022-09-11 RX ORDER — LORAZEPAM 1 MG/1
2 TABLET ORAL ONCE
Status: COMPLETED | OUTPATIENT
Start: 2022-09-11 | End: 2022-09-11

## 2022-09-11 RX ORDER — OLANZAPINE 5 MG/1
10 TABLET, ORALLY DISINTEGRATING ORAL ONCE
Status: COMPLETED | OUTPATIENT
Start: 2022-09-11 | End: 2022-09-11

## 2022-09-11 RX ORDER — SODIUM CHLORIDE, SODIUM LACTATE, POTASSIUM CHLORIDE, CALCIUM CHLORIDE 600; 310; 30; 20 MG/100ML; MG/100ML; MG/100ML; MG/100ML
1000 INJECTION, SOLUTION INTRAVENOUS ONCE
Status: DISCONTINUED | OUTPATIENT
Start: 2022-09-11 | End: 2022-09-11 | Stop reason: HOSPADM

## 2022-09-11 RX ORDER — HYDROXYZINE PAMOATE 25 MG/1
25 CAPSULE ORAL ONCE
Status: DISCONTINUED | OUTPATIENT
Start: 2022-09-11 | End: 2022-09-11

## 2022-09-11 RX ORDER — TAMSULOSIN HYDROCHLORIDE 0.4 MG/1
0.8 CAPSULE ORAL ONCE
Status: COMPLETED | OUTPATIENT
Start: 2022-09-11 | End: 2022-09-11

## 2022-09-11 RX ORDER — SODIUM CHLORIDE, SODIUM LACTATE, POTASSIUM CHLORIDE, CALCIUM CHLORIDE 600; 310; 30; 20 MG/100ML; MG/100ML; MG/100ML; MG/100ML
1000 INJECTION, SOLUTION INTRAVENOUS CONTINUOUS
Status: DISCONTINUED | OUTPATIENT
Start: 2022-09-11 | End: 2022-09-11

## 2022-09-11 RX ORDER — SODIUM CHLORIDE, SODIUM LACTATE, POTASSIUM CHLORIDE, CALCIUM CHLORIDE 600; 310; 30; 20 MG/100ML; MG/100ML; MG/100ML; MG/100ML
1000 INJECTION, SOLUTION INTRAVENOUS CONTINUOUS
Status: DISCONTINUED | OUTPATIENT
Start: 2022-09-11 | End: 2022-09-11 | Stop reason: SDUPTHER

## 2022-09-11 RX ORDER — ACETAMINOPHEN 500 MG
1000 TABLET ORAL
Status: COMPLETED | OUTPATIENT
Start: 2022-09-11 | End: 2022-09-11

## 2022-09-11 RX ADMIN — OLANZAPINE 10 MG: 5 TABLET, ORALLY DISINTEGRATING ORAL at 15:22

## 2022-09-11 RX ADMIN — SODIUM CHLORIDE, SODIUM LACTATE, POTASSIUM CHLORIDE, AND CALCIUM CHLORIDE 1000 ML: .6; .31; .03; .02 INJECTION, SOLUTION INTRAVENOUS at 20:16

## 2022-09-11 RX ADMIN — SODIUM CHLORIDE, POTASSIUM CHLORIDE, SODIUM LACTATE AND CALCIUM CHLORIDE 1000 ML: 600; 310; 30; 20 INJECTION, SOLUTION INTRAVENOUS at 19:13

## 2022-09-11 RX ADMIN — TAMSULOSIN HYDROCHLORIDE 0.8 MG: 0.4 CAPSULE ORAL at 15:21

## 2022-09-11 RX ADMIN — LORAZEPAM 2 MG: 1 TABLET ORAL at 12:16

## 2022-09-11 RX ADMIN — OLANZAPINE 10 MG: 5 TABLET, ORALLY DISINTEGRATING ORAL at 10:44

## 2022-09-11 RX ADMIN — IBUPROFEN 600 MG: 200 TABLET, FILM COATED ORAL at 11:17

## 2022-09-11 RX ADMIN — ACETAMINOPHEN 1000 MG: 500 TABLET ORAL at 11:15

## 2022-09-11 ASSESSMENT — PAIN SCALES - GENERAL: PAINLEVEL_OUTOF10: 5

## 2022-09-11 ASSESSMENT — PAIN DESCRIPTION - FREQUENCY: FREQUENCY: CONTINUOUS

## 2022-09-11 ASSESSMENT — PAIN DESCRIPTION - ORIENTATION: ORIENTATION: LEFT;RIGHT

## 2022-09-11 ASSESSMENT — PAIN DESCRIPTION - LOCATION: LOCATION: LEG;FOOT

## 2022-09-11 ASSESSMENT — PAIN - FUNCTIONAL ASSESSMENT
PAIN_FUNCTIONAL_ASSESSMENT: 0-10
PAIN_FUNCTIONAL_ASSESSMENT: WONG-BAKER FACES

## 2022-09-11 ASSESSMENT — PAIN DESCRIPTION - DESCRIPTORS: DESCRIPTORS: BURNING

## 2022-09-11 NOTE — ED PROVIDER NOTES
ED Attending Attestation Note     Date of evaluation: 9/11/2022    This patient was seen by the resident. I have seen and examined the patient, agree with the workup, evaluation, management and diagnosis. The care plan has been discussed. My assessment reveals a disheveled appearing 70-year-old male, brought in by EMS with a police escort, who was found at [facility] after he banged on the door, screaming that he needed help because people with guns were chasing him. PD also reported that he was convinced something in his father's basement was going to get him and felt that he was hallucinating. On my exam, patient was awake and alert but was evasive in response to questions and would not make eye contact. He told me that people with guns were coming to get him and he knew this because he had \"heard lots of gun clicks in his room,\" but would not clarify who \"he\" referred to. He was notably tachycardic to the 130s at rest and his pupils were dilated at 6 mm.      Jaspal Orr MD  09/11/22 1049

## 2022-09-11 NOTE — ED PROVIDER NOTES
[address] [facility]          EM RESIDENT NOTE     Date of evaluation: 9/11/2022    ADDENDUM:      Care of this patient was assumed from Dr. Todd Hayward. The patient was seen for Altered Mental Status (Pt brought in by [facility] for AMS)  . The patient's initial evaluation and plan have been discussed with the prior provider who initially evaluated the patient. Nursing Notes, Past Medical Hx, Past Surgical Hx, Social Hx, Allergies, and Family Hx were all reviewed. Diagnostic Results     EKG   Interpreted by Dr. Todd Hayward. Rate: Tachycardic to 170s  Rhythm: Sinus tachycardia  Axis: Normal  Intervals: NE normal QRS normal QTC normal  Ischemia: None  Ectopy: None  Other: RSR prime pattern in V1 V2  Comparison: Grossly unchanged from 7/20/2020     Clinical Impression: Sinus tachycardia with incomplete right bundle block branch pattern but otherwise nonischemic EKG    RADIOLOGY:  XR CHEST PORTABLE   Final Result      No radiographic evidence for active cardiopulmonary process          LABS:   Results for orders placed or performed during the hospital encounter of 09/11/22   COVID-19, Rapid    Specimen: Nasopharyngeal Swab   Result Value Ref Range    SARS-CoV-2, NAAT Not Detected Not Detected   Culture, Urine    Specimen: Urine, clean catch   Result Value Ref Range    Urine Culture, Routine       <10,000 CFU/ml mixed skin/urogenital benita.  No further workup   CMP w/ Reflex to MG   Result Value Ref Range    Sodium 136 136 - 145 mmol/L    Potassium reflex Magnesium 3.5 3.5 - 5.1 mmol/L    Chloride 94 (L) 99 - 110 mmol/L    CO2 28 21 - 32 mmol/L    Anion Gap 14 3 - 16    Glucose 118 (H) 70 - 99 mg/dL    BUN 14 7 - 20 mg/dL    Creatinine 0.9 0.9 - 1.3 mg/dL    GFR Non-African American >60 >60    GFR African American >60 >60    Calcium 9.2 8.3 - 10.6 mg/dL    Total Protein 6.9 6.4 - 8.2 g/dL    Albumin 5.0 3.4 - 5.0 g/dL    Albumin/Globulin Ratio 2.6 (H) 1.1 - 2.2    Total Bilirubin 0.4 0.0 - 1.0 mg/dL    Alkaline Phosphatase 72 40 - 129 U/L    ALT 10 10 - 40 U/L    AST 15 15 - 37 U/L   CBC with Auto Differential   Result Value Ref Range    WBC 7.8 4.0 - 11.0 K/uL    RBC 4.92 4.20 - 5.90 M/uL    Hemoglobin 14.7 13.5 - 17.5 g/dL    Hematocrit 41.6 40.5 - 52.5 %    MCV 84.6 80.0 - 100.0 fL    MCH 29.9 26.0 - 34.0 pg    MCHC 35.4 31.0 - 36.0 g/dL    RDW 12.7 12.4 - 15.4 %    Platelets 845 359 - 899 K/uL    MPV 7.5 5.0 - 10.5 fL    Neutrophils % 74.9 %    Lymphocytes % 11.7 %    Monocytes % 11.1 %    Eosinophils % 1.5 %    Basophils % 0.8 %    Neutrophils Absolute 5.8 1.7 - 7.7 K/uL    Lymphocytes Absolute 0.9 (L) 1.0 - 5.1 K/uL    Monocytes Absolute 0.9 0.0 - 1.3 K/uL    Eosinophils Absolute 0.1 0.0 - 0.6 K/uL    Basophils Absolute 0.1 0.0 - 0.2 K/uL   Urinalysis with Reflex to Culture    Specimen: Urine   Result Value Ref Range    Color, UA Yellow Straw/Yellow    Clarity, UA Clear Clear    Glucose, Ur Negative Negative mg/dL    Bilirubin Urine Negative Negative    Ketones, Urine TRACE (A) Negative mg/dL    Specific Gravity, UA <=1.005 1.005 - 1.030    Blood, Urine MODERATE (A) Negative    pH, UA 6.5 5.0 - 8.0    Protein, UA Negative Negative mg/dL    Urobilinogen, Urine 0.2 <2.0 E.U./dL    Nitrite, Urine Negative Negative    Leukocyte Esterase, Urine SMALL (A) Negative    Microscopic Examination YES     Urine Type NotGiven     Urine Reflex to Culture Yes    Troponin   Result Value Ref Range    Troponin <0.01 <0.01 ng/mL   Acetaminophen Level   Result Value Ref Range    Acetaminophen Level <5 (L) 10 - 30 ug/mL   Salicylate   Result Value Ref Range    Salicylate, Serum <7.6 (L) 15.0 - 30.0 mg/dL   Urine Drug Screen   Result Value Ref Range    Amphetamine Screen, Urine Neg Negative <1000ng/mL    Barbiturate Screen, Ur Neg Negative <200 ng/mL    Benzodiazepine Screen, Urine Neg Negative <200 ng/mL    Cannabinoid Scrn, Ur POSITIVE (A) Negative <50 ng/mL    Cocaine Metabolite Screen, Urine Neg Negative <300 ng/mL    Opiate Scrn, Ur Neg Negative <300 ng/mL    PCP Screen, Urine Neg Negative <25 ng/mL    Methadone Screen, Urine Neg Negative <300 ng/mL    Oxycodone Urine Neg Negative <100 ng/ml    FENTANYL SCREEN, URINE Neg Negative <50 ng/mL    pH, UA 6.5     Drug Screen Comment: see below    Ethanol   Result Value Ref Range    Ethanol Lvl None Detected mg/dL   Magnesium   Result Value Ref Range    Magnesium 2.10 1.80 - 2.40 mg/dL   Microscopic Urinalysis   Result Value Ref Range    Mucus, UA Rare (A) None Seen /LPF    WBC, UA 10-20 (A) 0 - 5 /HPF    RBC, UA  (A) 0 - 4 /HPF    Epithelial Cells, UA 0-1 0 - 5 /HPF    Renal Epithelial, UA 0-1 0 - 1 /HPF    Bacteria, UA Rare (A) None Seen /HPF       RECENT VITALS:  BP: 99/68, Temp: 99.7 °F (37.6 °C), Heart Rate: 57, Resp: 11, SpO2: 95 %     Procedures     No ED procedures were performed during this visit. ED Course     ED Course as of 09/12/22 1415   Sun Sep 11, 2022   4065 Patient is medically ready for psychiatric evaluation. [TH]      ED Course User Index  [TH] Benay Felty, MD       The patient was given the following medications:  Orders Placed This Encounter   Medications    OLANZapine zydis (ZYPREXA) disintegrating tablet 10 mg    ibuprofen (ADVIL;MOTRIN) tablet 600 mg    acetaminophen (TYLENOL) tablet 1,000 mg    DISCONTD: hydrOXYzine pamoate (VISTARIL) capsule 25 mg    LORazepam (ATIVAN) tablet 2 mg    OLANZapine zydis (ZYPREXA) disintegrating tablet 10 mg    tamsulosin (FLOMAX) capsule 0.8 mg    DISCONTD: lactated ringers infusion 1,000 mL    DISCONTD: lactated ringers infusion 1,000 mL    DISCONTD: lactated ringers infusion 1,000 mL       CONSULTS:  Danny / ASSESSMENT / Ulysses Smack     Briefly, Khloe Thomas is a 32 y.o. male who presented with psychosis NOS versus drug-induced. He had a recent admission to [address] [place] [address] ~2 weeks ago for paranoia.   Today, he was found at [facility] with behavior concerning for paranoid delusion. In the ED he has been treated with Zyprexa 10mg x1 and Ativan 2mg x1. The patient also has chronic urinary issues. Bladder scan demonstrated >1L liter fluid retention. Urology was consulted and a Cisneros was placed. He was also treated with Flomax. The cisneros will be removed prior to transfer to psychiatric facility. UDS positive for cannabis, lab work otherwise unremarkable. Dispo transfer to a psychiatric facility. Patient accepted for transfer at [facility] by Dr. Maile Apley. Patient medically clear and ready for transfer for psychiatric evaluation. This patient was also evaluated by the attending physician. All care plans were discussed and agreed upon. Clinical Impression     1. Paranoia (psychosis) ([facility] [address]        Disposition     PATIENT REFERRED TO:  No follow-up provider specified.     DISCHARGE MEDICATIONS:  Discharge Medication List as of 9/11/2022 10:05 PM          DISPOSITION Decision To Transfer 09/11/2022 02:00:40 PM        Hiram Smalls MD  Resident  09/12/22 9260

## 2022-09-11 NOTE — ED NOTES
After not being able to urinate, RNs tried to straight cath once with no urine return. Bladder scan showed >999ml urine and then RNs attempted to straight cath again with coude and still no urine returned. MD notified and flomax ordered.      Melissa Bobby RN  09/11/22 [id number]

## 2022-09-11 NOTE — ED PROVIDER NOTES
medications. He was discharged to [facility] the patient currently states that he lives alone. The patient denies any aggravating or alleviating factors or associated symptoms other than discussed above. Review of Systems     A comprehensive review of systems was performed and negative except as noted previously in the HPI. Past Medical, Surgical, Family, and Social History     He has a past medical history of ADD (attention deficit disorder), Allergic rhinitis, Allergy, Amphetamine abuse ([address] Asthma, Benzodiazepine abuse ([address] Biliary dyskinesia, GERD (gastroesophageal reflux disease), Gluten-induced enteropathy, Incomplete bladder emptying, Insomnia, Neuropathy, Nicotine dependence, Oppositional defiant disorder, Pain syndrome, chronic, Polysubstance abuse ([address] and Vitamin D deficiency. He has a past surgical history that includes [place] tooth extraction; Upper gastrointestinal endoscopy; and Cholecystectomy, laparoscopic (12/22/2017). His family history includes Cancer in his maternal grandfather; Diabetes in his paternal grandmother; Heart Disease in his maternal grandfather; High Blood Pressure in his maternal grandfather; High Cholesterol in his maternal grandfather. He reports that he has been smoking cigarettes. He has a 6.00 pack-year smoking history. He has quit using smokeless tobacco. He reports current drug use. Drug: Marijuana Charmayne Stai). He reports that he does not drink alcohol.     Medications     Previous Medications    ALBUTEROL SULFATE HFA (PROAIR HFA) 108 (90 BASE) MCG/ACT INHALER    Inhale 2 puffs into the lungs every 6 hours as needed for Wheezing or Shortness of Breath    BUSPIRONE (BUSPAR) 30 MG TABLET    Take 1 tablet by mouth 2 times daily    CALCIUM CARBONATE (TUMS) 500 MG CHEWABLE TABLET    Take 1 tablet by mouth daily as needed for Heartburn    DULOXETINE (CYMBALTA) 30 MG EXTENDED RELEASE CAPSULE    TAKE 1 CAPSULE BY MOUTH DAILY WITH THE 60MG CAPSULES    DULOXETINE (CYMBALTA) 60 MG EXTENDED RELEASE CAPSULE    Take 1 capsule by mouth 2 times daily    GABAPENTIN (NEURONTIN) 300 MG CAPSULE    Take 1 capsule by mouth 2 times daily for 14 days. Intended supply: 30 days    OXYBUTYNIN (DITROPAN XL) 15 MG EXTENDED RELEASE TABLET    TAKE 1 TABLET BY MOUTH EVERY DAY    TAMSULOSIN (FLOMAX) 0.4 MG CAPSULE    Take 0.4 mg by mouth in the morning. TRAZODONE (DESYREL) 50 MG TABLET    Take 1 tablet by mouth nightly     Allergies     He is allergic to amoxicillin, ceclor [cefaclor], and gluten meal.    Physical Exam     INITIAL VITALS: BP: 115/68, Temp: 99.7 °F (37.6 °C), Heart Rate: (!) 153, Resp: 13, SpO2: 97 %     General: Overall uncomfortable-appearing. Head: Normocephalic/atraumatic. Eyes: Anicteric. Pupils 4 mm bilaterally reactive. No discharge from eyes. ENT: External ears normal. No discharge from nose, OP clear. Neck: Supple, trachea midline. Pulmonary: Non-labored breathing. Breath sounds clear bilaterally. Cardiac: Tachycardic rate. Regular rhythm. No murmurs. Abdomen: Soft. Non-distended. Non-tender. Musculoskeletal: No long bone deformity. Skin: Diaphoretic, no rashes. Extremities: Warm and well perfused. No peripheral edema. Neuro: Alert. Moves all four extremities to command. Sensation grossly intact to light touch. No focal deficit. Psych: Mood is elusive, affect is congruent. Voice is mumbled. Avoids eye contact.     DiagnosticResults     EKG   Indication: Altered mental status    Rate: Tachycardic to 170s  Rhythm: Sinus tachycardia  Axis: Normal  Intervals: CO normal QRS normal QTC normal  Ischemia: None  Ectopy: None  Other: RSR prime pattern in V1 V2  Comparison: Grossly unchanged from 7/20/2020    Clinical Impression: Sinus tachycardia with incomplete right bundle block branch pattern but otherwise nonischemic EKG    Interpreted in conjunction with emergencydepartment physician Bharati Andres MD    RADIOLOGY:  XR CHEST PORTABLE   Final Result      No radiographic evidence for active cardiopulmonary process        LABS:   Results for orders placed or performed during the hospital encounter of 09/11/22   COVID-19, Rapid    Specimen: Nasopharyngeal Swab   Result Value Ref Range    SARS-CoV-2, NAAT Not Detected Not Detected   CMP w/ Reflex to MG   Result Value Ref Range    Sodium 136 136 - 145 mmol/L    Potassium reflex Magnesium 3.5 3.5 - 5.1 mmol/L    Chloride 94 (L) 99 - 110 mmol/L    CO2 28 21 - 32 mmol/L    Anion Gap 14 3 - 16    Glucose 118 (H) 70 - 99 mg/dL    BUN 14 7 - 20 mg/dL    Creatinine 0.9 0.9 - 1.3 mg/dL    GFR Non-African American >60 >60    GFR African American >60 >60    Calcium 9.2 8.3 - 10.6 mg/dL    Total Protein 6.9 6.4 - 8.2 g/dL    Albumin 5.0 3.4 - 5.0 g/dL    Albumin/Globulin Ratio 2.6 (H) 1.1 - 2.2    Total Bilirubin 0.4 0.0 - 1.0 mg/dL    Alkaline Phosphatase 72 40 - 129 U/L    ALT 10 10 - 40 U/L    AST 15 15 - 37 U/L   CBC with Auto Differential   Result Value Ref Range    WBC 7.8 4.0 - 11.0 K/uL    RBC 4.92 4.20 - 5.90 M/uL    Hemoglobin 14.7 13.5 - 17.5 g/dL    Hematocrit 41.6 40.5 - 52.5 %    MCV 84.6 80.0 - 100.0 fL    MCH 29.9 26.0 - 34.0 pg    MCHC 35.4 31.0 - 36.0 g/dL    RDW 12.7 12.4 - 15.4 %    Platelets 412 082 - 520 K/uL    MPV 7.5 5.0 - 10.5 fL    Neutrophils % 74.9 %    Lymphocytes % 11.7 %    Monocytes % 11.1 %    Eosinophils % 1.5 %    Basophils % 0.8 %    Neutrophils Absolute 5.8 1.7 - 7.7 K/uL    Lymphocytes Absolute 0.9 (L) 1.0 - 5.1 K/uL    Monocytes Absolute 0.9 0.0 - 1.3 K/uL    Eosinophils Absolute 0.1 0.0 - 0.6 K/uL    Basophils Absolute 0.1 0.0 - 0.2 K/uL   Urinalysis with Reflex to Culture    Specimen: Urine   Result Value Ref Range    Color, UA Yellow Straw/Yellow    Clarity, UA Clear Clear    Glucose, Ur Negative Negative mg/dL    Bilirubin Urine Negative Negative    Ketones, Urine TRACE (A) Negative mg/dL    Specific Gravity, UA <=1.005 1.005 - 1.030    Blood, Urine MODERATE (A) Negative    pH, UA 6.5 5.0 - 8.0    Protein, UA Negative Negative mg/dL    Urobilinogen, Urine 0.2 <2.0 E.U./dL    Nitrite, Urine Negative Negative    Leukocyte Esterase, Urine SMALL (A) Negative    Microscopic Examination YES     Urine Type NotGiven     Urine Reflex to Culture Yes    Troponin   Result Value Ref Range    Troponin <0.01 <0.01 ng/mL   Acetaminophen Level   Result Value Ref Range    Acetaminophen Level <5 (L) 10 - 30 ug/mL   Salicylate   Result Value Ref Range    Salicylate, Serum <3.4 (L) 15.0 - 30.0 mg/dL   Urine Drug Screen   Result Value Ref Range    Amphetamine Screen, Urine Neg Negative <1000ng/mL    Barbiturate Screen, Ur Neg Negative <200 ng/mL    Benzodiazepine Screen, Urine Neg Negative <200 ng/mL    Cannabinoid Scrn, Ur POSITIVE (A) Negative <50 ng/mL    Cocaine Metabolite Screen, Urine Neg Negative <300 ng/mL    Opiate Scrn, Ur Neg Negative <300 ng/mL    PCP Screen, Urine Neg Negative <25 ng/mL    Methadone Screen, Urine Neg Negative <300 ng/mL    Oxycodone Urine Neg Negative <100 ng/ml    FENTANYL SCREEN, URINE Neg Negative <50 ng/mL    pH, UA 6.5     Drug Screen Comment: see below    Ethanol   Result Value Ref Range    Ethanol Lvl None Detected mg/dL   Magnesium   Result Value Ref Range    Magnesium 2.10 1.80 - 2.40 mg/dL   Microscopic Urinalysis   Result Value Ref Range    Mucus, UA Rare (A) None Seen /LPF    WBC, UA 10-20 (A) 0 - 5 /HPF    RBC, UA  (A) 0 - 4 /HPF    Epithelial Cells, UA 0-1 0 - 5 /HPF    Renal Epithelial, UA 0-1 0 - 1 /HPF    Bacteria, UA Rare (A) None Seen /HPF       ED BEDSIDE ULTRASOUND:  No bedside ultrasound performed. RECENT VITALS:  BP: 99/68, Temp: 99.7 °F (37.6 °C), Heart Rate: 57,Resp: 11, SpO2: 95 %     Procedures     No ED procedures performed. ED Course     Nursing Notes, Past Medical Hx, Past Surgical Hx, Social Hx, Allergies, and Family Hx were reviewed.     The patient was given the followingmedications:  Orders Placed This Encounter   Medications    OLANZapine zydis (ZYPREXA) disintegrating tablet 10 mg    ibuprofen (ADVIL;MOTRIN) tablet 600 mg    acetaminophen (TYLENOL) tablet 1,000 mg    DISCONTD: hydrOXYzine pamoate (VISTARIL) capsule 25 mg    LORazepam (ATIVAN) tablet 2 mg    OLANZapine zydis (ZYPREXA) disintegrating tablet 10 mg    tamsulosin (FLOMAX) capsule 0.8 mg    DISCONTD: lactated ringers infusion 1,000 mL    DISCONTD: lactated ringers infusion 1,000 mL    lactated ringers infusion 1,000 mL     CONSULTS:  IP CONSULT TO [facility] / ASSESSMENT / Dawson Frantz is a 32 y.o. male with a history and presentation as described above in HPI. Appropriate labs and diagnostic studies were reviewed as they were made available. Pertinent laboratory studies in medical decision making are listed below. Upon presentation, the patient was uncomfortable-appearing, afebrile, and hemodynamically stable but tachycardic. Patient presented to the emergency department with altered mental status concerning for paranoid psychosis with differential to include intoxication versus organic psychiatric disease. With symptoms apparent on arrival and police report of the patient's paranoid activity, feel that he is not currently able to take care of himself due to psychiatric decompensation and so a psychiatric hold was placed. With the patient's initial presentation of tachycardia, mildly dilated pupils, and complaint of urinary retention there is initial highest concern for an anticholinergic syndrome the patient denies any intoxication. He is not significantly diaphoretic and while evasive on interview does not appear to display psychomotor agitation more consistent with sympathomimetic syndrome.   In addition, chart review reveals the patient has a relatively recent inpatient psychiatric admission for a similar presentation of paranoid delusions with final diagnosis concerning for drug-induced psychosis versus psychosis not otherwise specified though the sample being obtained. Urinalysis with small leukocyte Estrace and blood likely traumatic, not significantly concerning for urinary tract infection. Mild discomfort treated with ibuprofen and Tylenol. Given resolution of his urinary retention and otherwise largely unremarkable work-up, feel the patient is currently medically cleared and stable for transport for further psychiatric evaluation. This patient was also evaluated by the attending physician, Dr. Keyana Tejeda MD. All care plans were discussed and agreed upon. Clinical Impression     1. Paranoia (psychosis) ([facility] [address]        Disposition     PATIENT REFERRED TO:  No follow-up provider specified. DISCHARGE MEDICATIONS:  New Prescriptions    No medications on file       DISPOSITION Decision To Transfer 09/11/2022 02:00:40 PM    At this time I am going off-service and will be signing out care of this patient to my colleague Dr. Martin Pacheco  for further care. My colleague's responsibilities will include:    -Arrange transport to psychiatric facility  -Additional evaluation, management, and disposition as indicated    Maycol Heck MD  PGY-2 Emergency [facility]    This note was dictated using voice-recognition software, which occasionally leads to inadvertent typographic errors.         Steffany Gaspar MD  Resident  09/11/22 1287

## 2022-09-12 PROBLEM — F29 PSYCHOSIS, UNSPECIFIED PSYCHOSIS TYPE (HCC): Status: ACTIVE | Noted: 2022-09-12

## 2022-09-12 PROBLEM — F11.20 OPIOID USE DISORDER, SEVERE, IN CONTROLLED ENVIRONMENT (HCC): Status: ACTIVE | Noted: 2022-09-12

## 2022-09-12 PROBLEM — F12.20 CANNABIS USE DISORDER, MODERATE, IN CONTROLLED ENVIRONMENT (HCC): Status: ACTIVE | Noted: 2022-09-12

## 2022-09-12 LAB
TSH SERPL DL<=0.05 MIU/L-ACNC: 1.1 UIU/ML (ref 0.27–4.2)
URINE CULTURE, ROUTINE: NORMAL

## 2022-09-12 PROCEDURE — 99221 1ST HOSP IP/OBS SF/LOW 40: CPT

## 2022-09-12 PROCEDURE — 6370000000 HC RX 637 (ALT 250 FOR IP): Performed by: PSYCHIATRY & NEUROLOGY

## 2022-09-12 PROCEDURE — 1240000000 HC EMOTIONAL WELLNESS R&B

## 2022-09-12 PROCEDURE — 99223 1ST HOSP IP/OBS HIGH 75: CPT | Performed by: PSYCHIATRY & NEUROLOGY

## 2022-09-12 PROCEDURE — 84443 ASSAY THYROID STIM HORMONE: CPT

## 2022-09-12 PROCEDURE — 36415 COLL VENOUS BLD VENIPUNCTURE: CPT

## 2022-09-12 PROCEDURE — 6370000000 HC RX 637 (ALT 250 FOR IP)

## 2022-09-12 RX ORDER — OLANZAPINE 10 MG/1
10 TABLET ORAL ONCE
Status: DISCONTINUED | OUTPATIENT
Start: 2022-09-12 | End: 2022-09-12

## 2022-09-12 RX ORDER — OLANZAPINE 5 MG/1
5 TABLET ORAL EVERY 4 HOURS PRN
Status: DISCONTINUED | OUTPATIENT
Start: 2022-09-12 | End: 2022-09-16

## 2022-09-12 RX ORDER — LANOLIN ALCOHOL/MO/W.PET/CERES
3 CREAM (GRAM) TOPICAL NIGHTLY PRN
Status: DISCONTINUED | OUTPATIENT
Start: 2022-09-12 | End: 2022-09-12

## 2022-09-12 RX ORDER — NICOTINE 21 MG/24HR
1 PATCH, TRANSDERMAL 24 HOURS TRANSDERMAL DAILY
Status: DISCONTINUED | OUTPATIENT
Start: 2022-09-12 | End: 2022-09-16 | Stop reason: HOSPADM

## 2022-09-12 RX ORDER — IBUPROFEN 400 MG/1
400 TABLET ORAL EVERY 6 HOURS PRN
Status: DISCONTINUED | OUTPATIENT
Start: 2022-09-12 | End: 2022-09-12

## 2022-09-12 RX ORDER — DIPHENHYDRAMINE HYDROCHLORIDE 50 MG/ML
50 INJECTION INTRAMUSCULAR; INTRAVENOUS EVERY 4 HOURS PRN
Status: DISCONTINUED | OUTPATIENT
Start: 2022-09-12 | End: 2022-09-16

## 2022-09-12 RX ORDER — TRAMADOL HYDROCHLORIDE 50 MG/1
50 TABLET ORAL PRN
Status: DISCONTINUED | OUTPATIENT
Start: 2022-09-12 | End: 2022-09-14

## 2022-09-12 RX ORDER — TAMSULOSIN HYDROCHLORIDE 0.4 MG/1
0.4 CAPSULE ORAL DAILY
Status: DISCONTINUED | OUTPATIENT
Start: 2022-09-12 | End: 2022-09-16 | Stop reason: HOSPADM

## 2022-09-12 RX ORDER — METHOCARBAMOL 500 MG/1
750 TABLET, FILM COATED ORAL EVERY 6 HOURS PRN
Status: DISCONTINUED | OUTPATIENT
Start: 2022-09-12 | End: 2022-09-16

## 2022-09-12 RX ORDER — BACLOFEN 20 MG/1
1 TABLET ORAL 3 TIMES DAILY
Status: ON HOLD | COMMUNITY
Start: 2022-08-25 | End: 2022-09-16

## 2022-09-12 RX ORDER — PALIPERIDONE 3 MG/1
3 TABLET, EXTENDED RELEASE ORAL DAILY
Status: DISCONTINUED | OUTPATIENT
Start: 2022-09-12 | End: 2022-09-14

## 2022-09-12 RX ORDER — ACETAMINOPHEN 325 MG/1
650 TABLET ORAL EVERY 6 HOURS PRN
Status: DISCONTINUED | OUTPATIENT
Start: 2022-09-12 | End: 2022-09-16 | Stop reason: HOSPADM

## 2022-09-12 RX ORDER — LORAZEPAM 2 MG/1
2 TABLET ORAL ONCE
Status: COMPLETED | OUTPATIENT
Start: 2022-09-12 | End: 2022-09-12

## 2022-09-12 RX ORDER — HYDROXYZINE 50 MG/1
50 TABLET, FILM COATED ORAL 3 TIMES DAILY PRN
Status: DISCONTINUED | OUTPATIENT
Start: 2022-09-12 | End: 2022-09-16 | Stop reason: HOSPADM

## 2022-09-12 RX ORDER — MAGNESIUM HYDROXIDE/ALUMINUM HYDROXICE/SIMETHICONE 120; 1200; 1200 MG/30ML; MG/30ML; MG/30ML
30 SUSPENSION ORAL EVERY 6 HOURS PRN
Status: DISCONTINUED | OUTPATIENT
Start: 2022-09-12 | End: 2022-09-16 | Stop reason: HOSPADM

## 2022-09-12 RX ORDER — POLYETHYLENE GLYCOL 3350 17 G
2 POWDER IN PACKET (EA) ORAL
Status: DISCONTINUED | OUTPATIENT
Start: 2022-09-12 | End: 2022-09-15

## 2022-09-12 RX ORDER — OLANZAPINE 20 MG/1
20 TABLET ORAL NIGHTLY
Status: ON HOLD | COMMUNITY
Start: 2022-08-19 | End: 2022-09-16

## 2022-09-12 RX ORDER — ACETAMINOPHEN 325 MG/1
650 TABLET ORAL EVERY 4 HOURS PRN
Status: DISCONTINUED | OUTPATIENT
Start: 2022-09-12 | End: 2022-09-12

## 2022-09-12 RX ORDER — OXYBUTYNIN CHLORIDE 5 MG/1
1 TABLET ORAL DAILY
Status: ON HOLD | COMMUNITY
Start: 2022-08-19 | End: 2022-09-16

## 2022-09-12 RX ADMIN — OLANZAPINE 5 MG: 5 TABLET, FILM COATED ORAL at 15:57

## 2022-09-12 RX ADMIN — HYDROXYZINE HYDROCHLORIDE 50 MG: 50 TABLET, FILM COATED ORAL at 15:58

## 2022-09-12 RX ADMIN — METHOCARBAMOL TABLETS 750 MG: 500 TABLET, COATED ORAL at 14:54

## 2022-09-12 RX ADMIN — TRAMADOL HYDROCHLORIDE 50 MG: 50 TABLET, COATED ORAL at 17:24

## 2022-09-12 RX ADMIN — TAMSULOSIN HYDROCHLORIDE 0.4 MG: 0.4 CAPSULE ORAL at 14:54

## 2022-09-12 RX ADMIN — ACETAMINOPHEN 650 MG: 325 TABLET ORAL at 11:12

## 2022-09-12 RX ADMIN — PALIPERIDONE 3 MG: 3 TABLET, EXTENDED RELEASE ORAL at 14:54

## 2022-09-12 RX ADMIN — NICOTINE POLACRILEX 2 MG: 2 LOZENGE ORAL at 17:42

## 2022-09-12 RX ADMIN — TRAMADOL HYDROCHLORIDE 50 MG: 50 TABLET, COATED ORAL at 14:54

## 2022-09-12 RX ADMIN — LORAZEPAM 2 MG: 2 TABLET ORAL at 10:14

## 2022-09-12 ASSESSMENT — LIFESTYLE VARIABLES
HOW MANY STANDARD DRINKS CONTAINING ALCOHOL DO YOU HAVE ON A TYPICAL DAY: PATIENT DECLINED
HOW OFTEN DO YOU HAVE A DRINK CONTAINING ALCOHOL: PATIENT DECLINED

## 2022-09-12 ASSESSMENT — SLEEP AND FATIGUE QUESTIONNAIRES
DO YOU HAVE DIFFICULTY SLEEPING: NO
DO YOU USE A SLEEP AID: NO
AVERAGE NUMBER OF SLEEP HOURS: 8

## 2022-09-12 ASSESSMENT — PAIN SCALES - GENERAL: PAINLEVEL_OUTOF10: 6

## 2022-09-12 ASSESSMENT — PAIN DESCRIPTION - LOCATION: LOCATION: GENERALIZED

## 2022-09-12 NOTE — H&P
[address]                 [address] [address]                              HISTORY AND PHYSICAL    PATIENT NAME: Mohamud Espana                     :        1995  MED REC NO:   [id number]                          ROOM:       5  ACCOUNT NO:   [de-identified]                           ADMIT DATE: 2022  PROVIDER:     William Stevenson MD    IDENTIFICATION:  This is a homeless, never , unemployed  51-year-old with a chart history of substance use and psychotic  symptoms, who was brought to [facility] Emergency Department by squad  after banging on the door of a [place] while stating that people  were chasing him with guns. SOURCES OF INFORMATION:  The patient. Focused record review. CHIEF COMPLAINT:  \"I thought my friend was trying to kill me. \"    HISTORY OF PRESENT ILLNESS:  According to Care Everywhere records, the  patient has been admitted twice over the last couple of months, first to  [facility] and then [facility]. Evidently, he was admitted to [facility] previously after chasing his father with a hammer in what appears to be a [place] delusion. At the [facility], he expressed a number of delusional ideas including that he is friends with  and his  sister is the Ajith. At first,  thought it was a substance-induced psychotic event, but as time went on, they became more concerned for a primary psychotic disorder such as  schizophrenia. Their note does not describe the patient to be  hallucinating or disorganized. His discharge diagnosis  from  was psychosis, NOS. He was discharged on Zyprexa 20 mg at bedtime, Suboxone 12/3 daily, BuSpar 10 mg three times a day, Cymbalta 60 mg daily, and trazodone 100 mg at bedtime as needed.     According to the note, he was brought to [facility] ED yesterday after  someone called because he was found banging on the door of a [facility], while claiming people were chasing him with guns. When police  arrived, he expressed paranoid ideas and seemed to be  hallucinating. Evidently, when EMS arrived and attempted to evaluate  him, he tried to eat their ECG leads. Given this, he was  transported  to the ED for evaluation. There, he endorsed fear as people  were trying to kill him with guns. When I met with him, he told me that a friend (with whom he was staying)  was trying to kill him. He knew because he heard gun clicking noises. He ran out of his friend's house and up the street to [place]  and says the called 911. He made a number of delusional statements during my interview including that he believes he was adopted from [facility] because he looks different than his parents. When asked whether or not he has kids, he said Evelyn one or two,\" but declined to elaborate further. He endorsed feeling safe here and willing to approach staff with concerns. Although sedated, his thought process was goal directable. He was not responding to internal stimuli, nor did he endorse auditory or visual  hallucinations. PSYCHIATRIC REVIEW OF SYSTEMS:  No leonardo or depression. STRESSORS:  Homeless, financial, social.    PAST PSYCHIATRIC HISTORY:  The patient tells me no previous  hospitalizations, though the chart note indicates he was admitted to  both [facility] and  the last couple of months. His discharge diagnosis  from  was psychosis, NOS, rule out schizophrenia. [facility] He did not follow up with outpatient  treatment. No known suicide attempts. Other than the medicines listed  under history of present illness, no other known medication trials. SUBSTANCE USE HISTORY:  The patient reports using opioids, specifically  Percocet, nearly daily. He also uses cannabis daily.   The patient  reports no other substance use, but the chart suggests he has a history of  amphetamine use too. He has been admitted to three residential  Programs. PAST MEDICAL HISTORY:  The patient reports no chronic conditions,  traumatic brain injuries, seizures or major surgeries. He was admitted  here with a Mukherjee catheter in place because of urinary retention.   notes also indicate a history of  problems. FAMILY PSYCHIATRIC HISTORY:  None known. No suicides. Chart notes  suggest family history of schizophrenia. MEDICATIONS:  He was discharged from  on trazodone p.r.n., Cymbalta 60  mg daily, BuSpar 10 mg three times daily, Zyprexa 20 mg daily, Suboxone  12/3 mg daily. ALLERGIES:  AMOXICILLIN and CECLOR. SOCIAL HISTORY:  Born in [place]. One sibling. He believes he may  have been adopted from [facility] because he looks different than his  parents. Growing up was \"pretty bad,\" but he declined to elaborate. He  graduated high school and has worked off and on since. He has never  . He is homeless. When asked about kids, he said \"maybe  one or two,\" but declined to elaborate. LEGAL HISTORY:  None known. REVIEW OF SYSTEMS:  He is not vaccinated for COVID, nor is he  interested. He did not describe or endorse recent headaches, change in  vision, chest pain, shortness of breath, cough, sore throat, fevers,  abdominal pain, neurological problems, bleeding problems or skin  problems. He was moving all four extremities and speaking without  difficulty. MENTAL STATUS EXAMINATION:  He presented in hospital gown. He was  sedated, but spoke freely. He made little eye  contact. He described his mood as \"okay\" and had a blunted affect. He  had mild psychomotor retardation. He spoke softly. He was not pressured. He was oriented to the date,  day, place and the context of this evaluation. His memory was intact. His use of language, speech and educational attainment suggested an average  level of intellectual functioning.     His thought processes were organized and goal-directed. He did not  describe or endorse homicidal or suicidal thinking. He did endorse  paranoid delusions. He did not endorse auditory or visual  hallucinations and was not responding to internal stimuli. He reported  feeling safe here and willing to approach staff with concerns. His ability for abstract thought was impaired based on his  interpretation of simple proverbs. Insight and judgment were impaired. PHYSICAL EXAMINATION:  VITALS:  Temperature 98.1, pulse 61, respiratory rate 20, blood pressure  108/67. GAIT:  Normal.    LABORATORY DATA:  Show a CMP with a chloride at 94, glucose of 118,  otherwise within normal limits. TSH, within normal limits. Ethanol  level, not detectable. Urine drug screen, positive for cannabis. Acetaminophen and salicylate levels below threshold. CBC, within normal  limits. UA shows possible infection. No culture suggested  contamination. FORMULATION:  This is a homeless, never , unemployed 27-year-old  with history of psychotic symptoms and substance use, who was brought by  squad to [facility] Emergency Department with ongoing psychotic symptoms. The patient presents paranoid, delusional, and impaired. This is in the  setting of two recent hospitalizations under similar circumstances. It  is also in the setting of opioid and cannabis use. Given the severity  of the symptoms, he was admitted for further evaluation, stabilization,  and treatment. DIAGNOSES:  1. Psychosis, unspecified. 2.  Opioid use disorder, severe, in controlled environment. 3.  Cannabis use disorder, moderate, in a controlled environment. 4.  History of urinary retention. PLAN:  1. Admit to Inpatient Psychiatry for stabilization and treatment. 2.  Start Invega for treatment of psychotic symptoms; he may benefit from  transition to a long-acting injectable.   Order q.15-minute checks for  safety, programming, and p.r.n. medication for anxiety, agitation,  insomnia and COWS protocol. 3.  Internal Medicine consult for admission. Can catheter be removed? 4.  Further collateral information from family for diagnostic  clarification and care coordination. 5.  Estimated length of stay, 5-8 days, for stabilization. He is  voluntary. A total of 70 minutes were spent with the patient in completing this  evaluation and more than 50% of the time was spent completing this  evaluation, providing counseling, and planning treatment with the  patient.         Kendrick Ferrari MD    D: 09/12/2022 14:46:07       T: 09/12/2022 14:56:35     [id number]  Job#: [id number]     Doc#: [id number]    CC:

## 2022-09-12 NOTE — PROGRESS NOTES
Inocencia Wu arrived on the unit via stretcher at 2230 with two medical transporters. Patient resting with eyes closed; no obvious signs of distress noted. Snack and drink offered; patient did not respond.

## 2022-09-12 NOTE — PROGRESS NOTES
Fela Sanon arrived at [facility] ED via EMS and police after banging on the door at a [place], screaming for help because people were chasing him and trying to kill him. He is positive this happened because he could \"hear the gun clicking in my room\". He was evasive and wouldn't make eye contact with staff in the ED. Perseveration thought process and paranoid. Fela Sanon has a history of urinary retention. A bladder scan at [facility] revealed >1 liter urine, but 2 straight cath attempts were unsuccessful. Cisneros was placed and initial cisneros yield was 1 liter. Fela Sanon admits to marijuana use and vaping. He was admitted to [facility] from 8/2 - 8/19 for psychosis. Fela Sanon was asleep when he arrived on the unit via stretcher. He could not be aroused & had to be manually transferred from stretcher to bed. This writer attempted to [facility] up 4 times, but was not successful. Admission is not complete. Cisneros is patent and draining urine, which has been documented on \"output\" flowsheet.

## 2022-09-12 NOTE — H&P
Hospital Medicine History & Physical      PCP: Yamila Jacobson MD    Date of Admission: 9/11/2022    Date of Service: Pt seen/examined on 9/12/2022     Chief Complaint:  No chief complaint on file. History Of Present Illness: The patient is a 32 y.o. male with pmhx of amphetamine use, GERD, chronic pain who presented to [facility] for paranoia and hallucinations. Patient was seen and evaluated in the ED by the ED medical provider, patient was medically cleared for admission to [facility] at [facility]. This note serves as an admission medical H&P. Tobacco use: 1 ppd   ETOH use: None  Illicit drug use: Marijuana     Patient denies any medical complaints  He does report that he sometimes has problem with urinary retention. Denies any blood in urine or painful urination. 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 Authorizing Provider   DULoxetine (CYMBALTA) 30 MG extended release capsule TAKE 1 CAPSULE BY MOUTH DAILY WITH THE 60MG CAPSULES 5/30/22   Historical Provider, MD   tamsulosin (FLOMAX) 0.4 MG capsule Take 0.4 mg by mouth in the morning.     Historical Provider, MD   gabapentin (NEURONTIN) 300 MG capsule Take 1 capsule by mouth 2 times daily for 14 days. Intended supply: 30 days 6/4/22 7/20/22  Axel Andrade PA-C   busPIRone (BUSPAR) 30 MG tablet Take 1 tablet by mouth 2 times daily 5/3/22   Historical Provider, MD   DULoxetine (CYMBALTA) 60 MG extended release capsule Take 1 capsule by mouth 2 times daily 5/4/22   Historical Provider, MD   traZODone (DESYREL) 50 MG tablet Take 1 tablet by mouth nightly 5/7/22   Historical Provider, MD   oxybutynin (DITROPAN XL) 15 MG extended release tablet TAKE 1 TABLET BY MOUTH EVERY DAY 5/3/22   Historical Provider, MD   albuterol sulfate HFA (PROAIR HFA) 108 (90 Base) MCG/ACT inhaler Inhale 2 puffs into the lungs every 6 hours as needed for Wheezing or Shortness of Breath 12/17/21   [facility] Roxanne Myers MD   calcium carbonate (TUMS) 500 MG chewable tablet Take 1 tablet by mouth daily as needed for Heartburn    Historical Provider, MD       Allergies:  Amoxicillin, Ceclor [cefaclor], Cyclosporine, and Gluten meal    Social History:       TOBACCO:   reports that he has been smoking cigarettes. He has a 6.00 pack-year smoking history. He has quit using smokeless tobacco.  ETOH:   reports no history of alcohol use.       Family History:   Positive as follows:        Problem Relation Age of Onset    Diabetes Paternal Grandmother     Cancer Maternal Grandfather     High Blood Pressure Maternal Grandfather     High Cholesterol Maternal Grandfather     Heart Disease Maternal Grandfather        REVIEW OF SYSTEMS:       Constitutional: Negative for fever   HENT: Negative for sore throat   Eyes: Negative for redness   Respiratory: Negative  for dyspnea, cough   Cardiovascular: Negative for chest pain   Gastrointestinal: Negative for vomiting, diarrhea   Genitourinary: Negative for hematuria   Musculoskeletal: Negative for arthralgias   Skin: Negative for rash   Neurological: Negative for syncope    Hematological: Negative for easy bruising/bleeding   Psychiatric/Behavorial: Per psychiatry team evaluation     PHYSICAL EXAM:    /67   Pulse 61   Temp 98.1 °F (36.7 °C) (Oral)   Resp 20   Ht 5' 8\" (1.727 m)   Wt 125 lb (56.7 kg)   SpO2 98%   BMI 19.01 kg/m²     Gen: No distress. Alert. Young male   Eyes: PERRL. No sclera icterus. No conjunctival injection. Neck: No JVD. No Carotid Bruit. Trachea midline. Resp: No accessory muscle use. No crackles. No wheezes. No rhonchi. CV: Regular rate. Regular rhythm. No murmur. No rub. No edema. GI: Non-tender. Non-distended. Normal bowel sounds. +cisneros catheter in   Skin: Warm and dry. No nodule on exposed extremities. No rash on exposed extremities. M/S: No cyanosis. No joint deformity. No clubbing. Neuro: Awake. No focal neurologic deficit on exam.  Cranial nerves II through XII intact. Patient is able to ambulate without difficulty.   Psych: Per psychiatry team evaluation     Lab Results   Component Value Date    WBC 7.8 09/11/2022    HGB 14.7 09/11/2022    HCT 41.6 09/11/2022    MCV 84.6 09/11/2022     09/11/2022     Lab Results   Component Value Date     09/11/2022    K 3.5 09/11/2022    CL 94 (L) 09/11/2022    CO2 28 09/11/2022    BUN 14 09/11/2022    CREATININE 0.9 09/11/2022    GLUCOSE 118 (H) 09/11/2022    CALCIUM 9.2 09/11/2022    PROT 6.9 09/11/2022    LABALBU 5.0 09/11/2022    BILITOT 0.4 09/11/2022    ALKPHOS 72 09/11/2022    AST 15 09/11/2022    ALT 10 09/11/2022    LABGLOM >60 09/11/2022    GFRAA >60 09/11/2022    AGRATIO 2.6 (H) 09/11/2022    GLOB 2.3 10/09/2017           U/A:    Lab Results   Component Value Date/Time    NITRITE neg 10/09/2017 03:45 PM    COLORU Yellow 09/11/2022 04:46 PM    WBCUA 10-20 09/11/2022 04:46 PM    RBCUA  09/11/2022 04:46 PM    MUCUS Rare 09/11/2022 04:46 PM    BACTERIA Rare 09/11/2022 04:46 PM    CLARITYU Clear 09/11/2022 04:46 PM    SPECGRAV <=1.005 09/11/2022 04:46 PM    LEUKOCYTESUR SMALL 09/11/2022 04:46 PM    BLOODU MODERATE 09/11/2022 04:46 PM    GLUCOSEU Negative 09/11/2022 04:46 PM    AMORPHOUS 1+ 09/15/2017 01:05 PM       CULTURES  COVID not detected     EKG:  Not obtained     RADIOLOGY  No orders to display         ASSESSMENT/PLAN:  #Acute psychosis   - per psychiatry team    #+UA   -urine culture negative   -could be colonization     #Urinary retention   -d/c cisneros   -flomax daily   -bladder scan   -if still with urinary retention, will consult urology     #Cannabis use   -recommend cessation      Pt has no medical complaints at this time. They were informed that should a medical concern arise during their admission they may have [facility] contact us.         Ping Drummond   9/12/2022 1:02 PM

## 2022-09-12 NOTE — CARE COORDINATION
09/12/22 1347   Psychiatric History   Psychiatric history treatment Psychiatric admissions  (recent admission in June)   Are there any medication issues? Yes  (Pt reports his medications are not working, upsets his stomach)   Recent Psychological Experiences Other(comment)  (\"Being here is the biggest thing to me. \")   Support System   Support system None   Problems in support system Alienated/estranged; Lack of access/ transportation;Paranoia   Current Living Situation   Home Living Homeless   Living information Lives alone   Problems with living situation  Yes   Lack of basic needs Yes   Lacking basic needs Shelter;Utilities; Heat;Food   SSDI/SSI denies government assistance   Other government assistance denies government assistance   Problems with environment none   Current abuse issues denies   Supervised setting None   Relationship problems No   Medical and Self-Care Issues   Relevant medical problems \"I think I have fibromyalgia. \"   Relevant self-care issues poor hygiene and self-care   Barriers to treatment Yes   Family Constellation   Spouse/partner-name/age single   Children-names/ages no children   Parents father - Dotty Lamer   Siblings 1 sister   Childhood   Raised by Biological father;Biological mother   Relevant family history denies   History of abuse No   Legal History   Legal history No   Juvenile legal history No   Substance Use   Use of substances  No  (per pt report)   Motivation for SA Treatment   Current barriers to treatment Transportation; Financial/insurance   Education   Education Other (comment)  (1 year of college)   Special education ADHD/ADD/LD   Work History   Currently employed [employer]   (none)   /VA involvement none   Cultural and Spiritual   Spiritual concerns No   Cultural concerns No     Completed by Zenobia Zelaya MM, MT-BC

## 2022-09-13 PROCEDURE — 6370000000 HC RX 637 (ALT 250 FOR IP): Performed by: PSYCHIATRY & NEUROLOGY

## 2022-09-13 PROCEDURE — 6370000000 HC RX 637 (ALT 250 FOR IP)

## 2022-09-13 PROCEDURE — 1240000000 HC EMOTIONAL WELLNESS R&B

## 2022-09-13 PROCEDURE — 99233 SBSQ HOSP IP/OBS HIGH 50: CPT | Performed by: PSYCHIATRY & NEUROLOGY

## 2022-09-13 RX ORDER — TRAZODONE HYDROCHLORIDE 50 MG/1
50 TABLET ORAL NIGHTLY
Status: DISCONTINUED | OUTPATIENT
Start: 2022-09-13 | End: 2022-09-16

## 2022-09-13 RX ORDER — OLANZAPINE 5 MG/1
5 TABLET ORAL ONCE
Status: COMPLETED | OUTPATIENT
Start: 2022-09-13 | End: 2022-09-13

## 2022-09-13 RX ADMIN — NICOTINE POLACRILEX 2 MG: 2 LOZENGE ORAL at 18:15

## 2022-09-13 RX ADMIN — PALIPERIDONE 3 MG: 3 TABLET, EXTENDED RELEASE ORAL at 08:34

## 2022-09-13 RX ADMIN — ACETAMINOPHEN 650 MG: 325 TABLET ORAL at 20:23

## 2022-09-13 RX ADMIN — NICOTINE POLACRILEX 2 MG: 2 LOZENGE ORAL at 07:17

## 2022-09-13 RX ADMIN — TAMSULOSIN HYDROCHLORIDE 0.4 MG: 0.4 CAPSULE ORAL at 08:34

## 2022-09-13 RX ADMIN — OLANZAPINE 5 MG: 5 TABLET, FILM COATED ORAL at 07:17

## 2022-09-13 RX ADMIN — NICOTINE POLACRILEX 2 MG: 2 LOZENGE ORAL at 11:52

## 2022-09-13 RX ADMIN — HYDROXYZINE HYDROCHLORIDE 50 MG: 50 TABLET, FILM COATED ORAL at 14:52

## 2022-09-13 RX ADMIN — NICOTINE POLACRILEX 2 MG: 2 LOZENGE ORAL at 14:42

## 2022-09-13 RX ADMIN — NICOTINE POLACRILEX 2 MG: 2 LOZENGE ORAL at 09:18

## 2022-09-13 RX ADMIN — NICOTINE POLACRILEX 2 MG: 2 LOZENGE ORAL at 10:50

## 2022-09-13 RX ADMIN — OLANZAPINE 5 MG: 5 TABLET, FILM COATED ORAL at 11:52

## 2022-09-13 RX ADMIN — HYDROXYZINE HYDROCHLORIDE 50 MG: 50 TABLET, FILM COATED ORAL at 07:17

## 2022-09-13 RX ADMIN — NICOTINE POLACRILEX 2 MG: 2 LOZENGE ORAL at 17:12

## 2022-09-13 RX ADMIN — ACETAMINOPHEN 650 MG: 325 TABLET ORAL at 07:17

## 2022-09-13 RX ADMIN — NICOTINE POLACRILEX 2 MG: 2 LOZENGE ORAL at 12:59

## 2022-09-13 RX ADMIN — NICOTINE POLACRILEX 2 MG: 2 LOZENGE ORAL at 20:23

## 2022-09-13 RX ADMIN — HYDROXYZINE HYDROCHLORIDE 50 MG: 50 TABLET, FILM COATED ORAL at 20:23

## 2022-09-13 RX ADMIN — ACETAMINOPHEN 650 MG: 325 TABLET ORAL at 15:36

## 2022-09-13 ASSESSMENT — PAIN - FUNCTIONAL ASSESSMENT
PAIN_FUNCTIONAL_ASSESSMENT: ACTIVITIES ARE NOT PREVENTED
PAIN_FUNCTIONAL_ASSESSMENT: ACTIVITIES ARE NOT PREVENTED

## 2022-09-13 ASSESSMENT — PAIN DESCRIPTION - LOCATION
LOCATION: PENIS
LOCATION: GENERALIZED

## 2022-09-13 ASSESSMENT — PAIN DESCRIPTION - DESCRIPTORS
DESCRIPTORS: TIGHTNESS
DESCRIPTORS: BURNING

## 2022-09-13 ASSESSMENT — PAIN SCALES - GENERAL
PAINLEVEL_OUTOF10: 7
PAINLEVEL_OUTOF10: 8

## 2022-09-13 NOTE — PROGRESS NOTES
Vineet Sheppard was very lethargic this evening. He was difficult to arouse long enough to interview. He would not advise if he was able to urinate & no urine was observed in toilet. Vineet Sheppard denied SI, HI, and AVH. He was withdrawn to his room for the majority of the shift. He did not attend group. No scheduled meds; no PRNs requested.

## 2022-09-13 NOTE — PROGRESS NOTES
Vida Yu approached the desk and complained of pain in his penis/burning while urinating as well as anxiety and agitation. Zyprexa, Tylenol, and Atarax administered according to the orders. See MAR. This writer also provided a nicotine lozenge and applied a new nicotine patch.

## 2022-09-13 NOTE — PLAN OF CARE
Problem: Coping  Goal: Pt/Family able to verbalize concerns and demonstrate effective coping strategies  Description: INTERVENTIONS:  1. Assist patient/family to identify coping skills, available support systems and cultural and spiritual values  2. Provide emotional support, including active listening and acknowledgement of concerns of patient and caregivers  3. Reduce environmental stimuli, as able  4. Instruct patient/family in relaxation techniques, as appropriate  5. Assess for spiritual pain/suffering and initiate Spiritual Care, Psychosocial Clinical Specialist consults as needed  9/13/2022 1430 by Olivia Walker RN  Outcome: Progressing     Problem: Confusion  Goal: Confusion, delirium, dementia, or psychosis is improved or at baseline  Description: INTERVENTIONS:  1. Assess for possible contributors to thought disturbance, including medications, impaired vision or hearing, underlying metabolic abnormalities, dehydration, psychiatric diagnoses, and notify attending LIP  2. [place] high risk fall precautions, as indicated  3. Provide frequent short contacts to provide reality reorientation, refocusing and direction  4. Decrease environmental stimuli, including noise as appropriate  5. Monitor and intervene to maintain adequate nutrition, hydration, elimination, sleep and activity  6. If unable to ensure safety without constant attention obtain sitter and review sitter guidelines with assigned personnel  7. Initiate Psychosocial CNS and Spiritual Care consult, as indicated  9/13/2022 1430 by Olivia Walker RN  Outcome: Progressing   Patient has been mainly been withdrawn to his room resting reporting that he is not feeling. He is A&Ox2 and does not have much insight to time and his situation that has him in the hospital. He denies that he is having thoughts to harm self or others. He denies that he is experiencing hallucinations. He did not make any delusional statements.  He does report that he is having RT's. He is very preoccupied on receiving Ativan. Earlier this morning he asked for Ativan and was informed he did not have it ordered then he asked if he any benzos available to give him. He was informed he only has Atarax and had it this morning. It did not stop him multiple times through shift asking for Ativan. He was cooperative with medication. He needs much redirection focused around medications.

## 2022-09-13 NOTE — PROGRESS NOTES
Department of Psychiatry  AttendingProgress Note  Chief Complaint: psychosis  Ruthy Mcmanus repeatedly comes to the nurse's desk to ask fo medication. He asked me numerous times to give him Ativan or Xanax because he believes that is what will help him. He asked in monotone speech and had a flattened affect. Later he asked for Percocet or Fentanyl for pain although he does not appear physically uncomfortable. Reviewed his OARRS which had Suboxone last written for 5 tabs 8/19/2022 and Gabapentin 6/5/2022 but no history of benzodiazepines. He was not interested in discussing his mental illness. Patient's chart was reviewed and collaborated with  about the treatment plan. SUBJECTIVE:    Patient is feeling unchanged. Suicidal ideation:  denies suicidal ideation. Patient does not have medication side effects. ROS: Patient has new complaints: no  Sleeping adequately:  Yes   Appetite adequate: Yes  Attending groups: No:   Visitors:No    OBJECTIVE    Physical  VITALS:  /82   Pulse (!) 101   Temp 97.7 °F (36.5 °C) (Oral)   Resp 16   Ht 5' 8\" (1.727 m)   Wt 125 lb (56.7 kg)   SpO2 100%   BMI 19.01 kg/m²     Mental Status Examination:  Patients appearance was ill-appearing. Thoughts are Illogical. Homicidal ideations none. No abnormal movements, tics or mannerisms. Memory intact A[place] 0. Concentration Fair. Alert and oriented X 4. Insight and Judgement impaired insight. Patient was distracted.  Patient gait normal. Mood labile, affect labile affect Hallucinations Absent, suicidal ideations no specific plan to harm self Speech soft  Data  Labs:   Admission on 09/11/2022   Component Date Value Ref Range Status    TSH 09/12/2022 1.10  0.27 - 4.20 uIU/mL Final            Medications  Current Facility-Administered Medications: magnesium hydroxide (MILK OF MAGNESIA) 400 MG/5ML suspension 30 mL, 30 mL, Oral, Daily PRN  nicotine polacrilex (COMMIT) lozenge 2 mg, 2 mg, Oral, Q1H PRN  aluminum & magnesium hydroxide-simethicone (MAALOX) 200-200-20 MG/5ML suspension 30 mL, 30 mL, Oral, Q6H PRN  hydrOXYzine HCl (ATARAX) tablet 50 mg, 50 mg, Oral, TID PRN  OLANZapine (ZYPREXA) tablet 5 mg, 5 mg, Oral, Q4H PRN **OR** OLANZapine (ZYPREXA) 10 mg in sterile water 2 mL injection, 10 mg, IntraMUSCular, Q4H PRN  diphenhydrAMINE (BENADRYL) injection 50 mg, 50 mg, IntraMUSCular, Q4H PRN  nicotine (NICODERM CQ) 21 MG/24HR 1 patch, 1 patch, TransDERmal, Daily  acetaminophen (TYLENOL) tablet 650 mg, 650 mg, Oral, Q6H PRN  paliperidone (INVEGA) extended release tablet 3 mg, 3 mg, Oral, Daily  tamsulosin (FLOMAX) capsule 0.4 mg, 0.4 mg, Oral, Daily  traMADol (ULTRAM) tablet 50 mg, 50 mg, Oral, PRN  methocarbamol (ROBAXIN) tablet 750 mg, 750 mg, Oral, Q6H PRN    ASSESSMENT AND PLAN    Principal Problem:    Psychosis, unspecified psychosis type ([facility].)  Active Problems:    Opioid use disorder, severe, in controlled environment ([facility] 75.)    Cannabis use disorder, moderate, in controlled environment [facility])    Urinary retention  Resolved Problems:    * No resolved hospital problems. *       1. Patient s symptoms   show no change  2. Probable discharge is 2-3 d  3. Discharge planning is incomplete  4. Suicidal ideation is  none  5. Total time with patient was 40 minutes and more than 50 % of that time was spent counseling the patient on their symptoms, treatment and expected goals.

## 2022-09-13 NOTE — PLAN OF CARE

## 2022-09-14 PROCEDURE — 99233 SBSQ HOSP IP/OBS HIGH 50: CPT | Performed by: PSYCHIATRY & NEUROLOGY

## 2022-09-14 PROCEDURE — 6370000000 HC RX 637 (ALT 250 FOR IP): Performed by: PSYCHIATRY & NEUROLOGY

## 2022-09-14 PROCEDURE — 6370000000 HC RX 637 (ALT 250 FOR IP)

## 2022-09-14 PROCEDURE — 6360000002 HC RX W HCPCS: Performed by: PSYCHIATRY & NEUROLOGY

## 2022-09-14 PROCEDURE — 1240000000 HC EMOTIONAL WELLNESS R&B

## 2022-09-14 RX ORDER — PALIPERIDONE 3 MG/1
6 TABLET, EXTENDED RELEASE ORAL DAILY
Status: DISCONTINUED | OUTPATIENT
Start: 2022-09-15 | End: 2022-09-15

## 2022-09-14 RX ORDER — BUPRENORPHINE HYDROCHLORIDE AND NALOXONE HYDROCHLORIDE DIHYDRATE 8; 2 MG/1; MG/1
1.5 TABLET SUBLINGUAL DAILY
Status: DISCONTINUED | OUTPATIENT
Start: 2022-09-14 | End: 2022-09-16 | Stop reason: HOSPADM

## 2022-09-14 RX ORDER — DIPHENHYDRAMINE HCL 25 MG
50 TABLET ORAL NIGHTLY PRN
Status: DISCONTINUED | OUTPATIENT
Start: 2022-09-15 | End: 2022-09-14

## 2022-09-14 RX ORDER — DIPHENHYDRAMINE HCL 25 MG
50 TABLET ORAL NIGHTLY PRN
Status: DISCONTINUED | OUTPATIENT
Start: 2022-09-14 | End: 2022-09-16 | Stop reason: HOSPADM

## 2022-09-14 RX ORDER — PALIPERIDONE 3 MG/1
3 TABLET, EXTENDED RELEASE ORAL ONCE
Status: COMPLETED | OUTPATIENT
Start: 2022-09-14 | End: 2022-09-14

## 2022-09-14 RX ORDER — DIPHENHYDRAMINE HCL 25 MG
25 TABLET ORAL EVERY 6 HOURS PRN
Status: DISCONTINUED | OUTPATIENT
Start: 2022-09-14 | End: 2022-09-14

## 2022-09-14 RX ADMIN — TRAMADOL HYDROCHLORIDE 50 MG: 50 TABLET, COATED ORAL at 07:06

## 2022-09-14 RX ADMIN — METHOCARBAMOL TABLETS 750 MG: 500 TABLET, COATED ORAL at 05:13

## 2022-09-14 RX ADMIN — NICOTINE POLACRILEX 2 MG: 2 LOZENGE ORAL at 17:09

## 2022-09-14 RX ADMIN — ACETAMINOPHEN 650 MG: 325 TABLET ORAL at 05:11

## 2022-09-14 RX ADMIN — NICOTINE POLACRILEX 2 MG: 2 LOZENGE ORAL at 08:28

## 2022-09-14 RX ADMIN — NICOTINE POLACRILEX 2 MG: 2 LOZENGE ORAL at 11:48

## 2022-09-14 RX ADMIN — ACETAMINOPHEN 650 MG: 325 TABLET ORAL at 21:01

## 2022-09-14 RX ADMIN — HYDROXYZINE HYDROCHLORIDE 50 MG: 50 TABLET, FILM COATED ORAL at 14:26

## 2022-09-14 RX ADMIN — BUPRENORPHINE HYDROCHLORIDE AND NALOXONE HYDROCHLORIDE DIHYDRATE 1.5 TABLET: 8; 2 TABLET SUBLINGUAL at 10:56

## 2022-09-14 RX ADMIN — PALIPERIDONE 3 MG: 3 TABLET, EXTENDED RELEASE ORAL at 08:22

## 2022-09-14 RX ADMIN — NICOTINE POLACRILEX 2 MG: 2 LOZENGE ORAL at 13:10

## 2022-09-14 RX ADMIN — NICOTINE POLACRILEX 2 MG: 2 LOZENGE ORAL at 19:13

## 2022-09-14 RX ADMIN — NICOTINE POLACRILEX 2 MG: 2 LOZENGE ORAL at 07:07

## 2022-09-14 RX ADMIN — TAMSULOSIN HYDROCHLORIDE 0.4 MG: 0.4 CAPSULE ORAL at 08:22

## 2022-09-14 RX ADMIN — ACETAMINOPHEN 650 MG: 325 TABLET ORAL at 17:37

## 2022-09-14 RX ADMIN — HYDROXYZINE HYDROCHLORIDE 50 MG: 50 TABLET, FILM COATED ORAL at 07:06

## 2022-09-14 RX ADMIN — PALIPERIDONE 3 MG: 3 TABLET, EXTENDED RELEASE ORAL at 10:56

## 2022-09-14 RX ADMIN — NICOTINE POLACRILEX 2 MG: 2 LOZENGE ORAL at 05:11

## 2022-09-14 RX ADMIN — NICOTINE POLACRILEX 2 MG: 2 LOZENGE ORAL at 14:25

## 2022-09-14 RX ADMIN — NICOTINE POLACRILEX 2 MG: 2 LOZENGE ORAL at 16:00

## 2022-09-14 RX ADMIN — DIPHENHYDRAMINE HCL 50 MG: 25 TABLET ORAL at 23:07

## 2022-09-14 ASSESSMENT — PAIN DESCRIPTION - LOCATION
LOCATION: GENERALIZED
LOCATION: LEG
LOCATION: OTHER (COMMENT)
LOCATION: OTHER (COMMENT)
LOCATION: BACK;PENIS
LOCATION: GENERALIZED

## 2022-09-14 ASSESSMENT — PAIN - FUNCTIONAL ASSESSMENT
PAIN_FUNCTIONAL_ASSESSMENT: ACTIVITIES ARE NOT PREVENTED

## 2022-09-14 ASSESSMENT — PAIN SCALES - GENERAL
PAINLEVEL_OUTOF10: 8
PAINLEVEL_OUTOF10: 4
PAINLEVEL_OUTOF10: 4
PAINLEVEL_OUTOF10: 5
PAINLEVEL_OUTOF10: 5
PAINLEVEL_OUTOF10: 8

## 2022-09-14 ASSESSMENT — PAIN DESCRIPTION - DESCRIPTORS
DESCRIPTORS: ACHING
DESCRIPTORS: CRAMPING
DESCRIPTORS: ACHING
DESCRIPTORS: CRAMPING
DESCRIPTORS: ACHING

## 2022-09-14 ASSESSMENT — PAIN DESCRIPTION - ORIENTATION: ORIENTATION: RIGHT;LEFT

## 2022-09-14 NOTE — GROUP NOTE
Group Therapy Note    Date: 9/14/2022    Group Start Time: 3954  Group End Time: 1400  Group Topic: Cognitive Skills    [id number] [facility]        Group Therapy Note    Attendees: 11    Group members completed \"A-Z Coping Skills. \" The members worked as a group and the writer wrote out each letter along with every coping skill on the dry erase board. Group members were asked if the list was helpful and if there's some new coping skills that they learned in this activity. Notes:  Juan F Rojas attended group for the full duration. Juan F Rojas remained engaged appropriate during group. Status After Intervention:  Improved    Participation Level:  Active Listener    Participation Quality: Appropriate      Speech:  normal      Thought Process/Content: Linear      Affective Functioning: Congruent      Mood: euthymic      Level of consciousness:  Alert      Response to Learning: Able to verbalize current knowledge/experience      Endings: None Reported    Modes of Intervention: Socialization and Exploration      Discipline Responsible: Behavorial Health Tech      Signature:  IVAN Sheets

## 2022-09-14 NOTE — PROGRESS NOTES
Pt to desk with complaints of not being able to sleep and pain. Prn medication given, see MAR. Snacks provided to pt per request. Pt states he has not slept tonight and requesting \"my ativan I had back, it really helped me sleep and Im exhausted. \" Emotional support provided.

## 2022-09-14 NOTE — PROGRESS NOTES
Abigail Landry approached the desk complaining of back pain, a headache, and pain in his penis. He states that he is able to urinate, but it burns/stings when he is urinating. He also stated that his penis is sore and hurts constantly. Atarax and Tramadol were administered per order. Abigail Landry also requested a nicotine lozenge, which was provided. See MAR.

## 2022-09-14 NOTE — PLAN OF CARE
Problem: Coping  Goal: Pt/Family able to verbalize concerns and demonstrate effective coping strategies  Description: INTERVENTIONS:  1. Assist patient/family to identify coping skills, available support systems and cultural and spiritual values  2. Provide emotional support, including active listening and acknowledgement of concerns of patient and caregivers  3. Reduce environmental stimuli, as able  4. Instruct patient/family in relaxation techniques, as appropriate  5. Assess for spiritual pain/suffering and initiate Spiritual Care, Psychosocial Clinical Specialist consults as needed  Outcome: Progressing     Problem: Confusion  Goal: Confusion, delirium, dementia, or psychosis is improved or at baseline  Description: INTERVENTIONS:  1. Assess for possible contributors to thought disturbance, including medications, impaired vision or hearing, underlying metabolic abnormalities, dehydration, psychiatric diagnoses, and notify attending LIP  2. [place] high risk fall precautions, as indicated  3. Provide frequent short contacts to provide reality reorientation, refocusing and direction  4. Decrease environmental stimuli, including noise as appropriate  5. Monitor and intervene to maintain adequate nutrition, hydration, elimination, sleep and activity  6. If unable to ensure safety without constant attention obtain sitter and review sitter guidelines with assigned personnel  7. Initiate Psychosocial CNS and Spiritual Care consult, as indicated  Outcome: Progressing   Patient has been visible out on the unit more today than yesterday. He is A&Ox3 and does seem to have more insight into his situation on why he is in the hospital. He did sign voluntary in the hospital to have medications added and changed. He denies that he is having thoughts to harm self or others. He denies that he is experiencing hallucinations. He did not make any delusional statements while interacting with staff.  He reports he did not sleep well last evening because of his anxiety. He did feel that the RT's were last/decreased, but his anxiety is still extremely high. He did feel that he was withdrawing from opiates, but did not have suboxone ordered, but once he was started on that as scheduled. He reports that he is feeling much better and less anxious. He also was less restless and at the team station wanting medications. He was able to sit through group and to watch TV. He has been no issue.

## 2022-09-14 NOTE — PROGRESS NOTES
Aneta Arevalo has been withdrawn to his room the majority of the shift. He was preoccupied with medications, but asked for Ambien instead of Ativan this evening. This nurse called Dr. Randal Humphries, but he declined to order and deferred to Dr. Joao Pleitez in the morning. Dr. Randal Humphries did order Trazodone 50mg PO PRN, but when this nurse approached Aneta Arevalo to administer, he was asleep. Aneta Arevalo advised he did urinate today and denies feelings of bladder fullness/pain. PRN Tylenol given for leg pain related to fibromyalgia that he rated an 8/10 on a 1-10 scale. Aneta Arevalo denies SI, HI, and AVH.

## 2022-09-14 NOTE — PROGRESS NOTES
Pt at desk asking for yogurt and asking when he can be discharged. Reviewed MD noted and explained it would be a couple more days. Pt agreeable and goes back to room at this time.

## 2022-09-15 PROCEDURE — 6370000000 HC RX 637 (ALT 250 FOR IP): Performed by: PSYCHIATRY & NEUROLOGY

## 2022-09-15 PROCEDURE — 6370000000 HC RX 637 (ALT 250 FOR IP)

## 2022-09-15 PROCEDURE — 1240000000 HC EMOTIONAL WELLNESS R&B

## 2022-09-15 PROCEDURE — 99233 SBSQ HOSP IP/OBS HIGH 50: CPT | Performed by: PSYCHIATRY & NEUROLOGY

## 2022-09-15 PROCEDURE — 6360000002 HC RX W HCPCS: Performed by: PSYCHIATRY & NEUROLOGY

## 2022-09-15 RX ORDER — IBUPROFEN 400 MG/1
400 TABLET ORAL EVERY 6 HOURS PRN
Status: DISCONTINUED | OUTPATIENT
Start: 2022-09-15 | End: 2022-09-16

## 2022-09-15 RX ORDER — PALIPERIDONE 3 MG/1
3 TABLET, EXTENDED RELEASE ORAL DAILY
Status: DISCONTINUED | OUTPATIENT
Start: 2022-09-16 | End: 2022-09-16

## 2022-09-15 RX ADMIN — DIPHENHYDRAMINE HCL 50 MG: 25 TABLET ORAL at 21:43

## 2022-09-15 RX ADMIN — OLANZAPINE 5 MG: 5 TABLET, FILM COATED ORAL at 15:25

## 2022-09-15 RX ADMIN — NICOTINE POLACRILEX 2 MG: 2 GUM, CHEWING BUCCAL at 14:26

## 2022-09-15 RX ADMIN — NICOTINE POLACRILEX 2 MG: 2 GUM, CHEWING BUCCAL at 15:38

## 2022-09-15 RX ADMIN — IBUPROFEN 400 MG: 400 TABLET, FILM COATED ORAL at 21:43

## 2022-09-15 RX ADMIN — ACETAMINOPHEN 650 MG: 325 TABLET ORAL at 13:27

## 2022-09-15 RX ADMIN — BUPRENORPHINE HYDROCHLORIDE AND NALOXONE HYDROCHLORIDE DIHYDRATE 1.5 TABLET: 8; 2 TABLET SUBLINGUAL at 08:28

## 2022-09-15 RX ADMIN — NICOTINE POLACRILEX 2 MG: 2 GUM, CHEWING BUCCAL at 16:37

## 2022-09-15 RX ADMIN — TAMSULOSIN HYDROCHLORIDE 0.4 MG: 0.4 CAPSULE ORAL at 08:28

## 2022-09-15 RX ADMIN — NICOTINE POLACRILEX 2 MG: 2 GUM, CHEWING BUCCAL at 13:27

## 2022-09-15 RX ADMIN — NICOTINE POLACRILEX 2 MG: 2 GUM, CHEWING BUCCAL at 17:14

## 2022-09-15 RX ADMIN — NICOTINE POLACRILEX 2 MG: 2 GUM, CHEWING BUCCAL at 11:19

## 2022-09-15 ASSESSMENT — PAIN DESCRIPTION - DESCRIPTORS
DESCRIPTORS: ACHING
DESCRIPTORS: ACHING

## 2022-09-15 ASSESSMENT — PAIN SCALES - GENERAL
PAINLEVEL_OUTOF10: 4
PAINLEVEL_OUTOF10: 5

## 2022-09-15 ASSESSMENT — PAIN DESCRIPTION - ORIENTATION
ORIENTATION: UPPER;LEFT;RIGHT
ORIENTATION: RIGHT;LEFT

## 2022-09-15 ASSESSMENT — PAIN DESCRIPTION - LOCATION
LOCATION: LEG
LOCATION: HEAD

## 2022-09-15 NOTE — PROGRESS NOTES
Department of Psychiatry  Progress Note    Patient's chart was reviewed. Discussed with treatment team. Met with patient. SUBJECTIVE:      Better today. No longer experiencing symptoms of opoid withdrawal.     Still very delusional. Says he doesn't have a family. Tolerating Invega well. Discussed an [place] trial at length, including the risks, benefits, and potential side effects. In agreement. ROS:   Patient has new complaints: no  Sleeping adequately:  Yes   Appetite adequate: Yes  Engaged in programming: No:     OBJECTIVE:  VITALS:  /85   Pulse (!) 104   Temp 97.3 °F (36.3 °C) (Temporal)   Resp 16   Ht 5' 8\" (1.727 m)   Wt 125 lb (56.7 kg)   SpO2 97%   BMI 19.01 kg/m²     Mental Status Examination:    Appearance: fair grooming and hygiene  Behavior/Attitude toward examiner:  fair eye contact  Speech: Normal rate, volume, amount  Mood:  \"ok\"  Affect:  blunted     Thought processes:  superficially organized  Thought Content: no SI, no HI, + delusional   Perceptions: no AVH, not RTIS  Attention: limited  Abstraction: limited  Cognition:  Alert and oriented to person, place, time, and situation, recall intact  Insight: Limited   Judgment: Limited    Medication:  Scheduled:   buprenorphine-naloxone  1.5 tablet SubLINGual Daily    paliperidone  6 mg Oral Daily    traZODone  50 mg Oral Nightly    nicotine  1 patch TransDERmal Daily    tamsulosin  0.4 mg Oral Daily        PRN:  ibuprofen, nicotine polacrilex, diphenhydrAMINE, magnesium hydroxide, aluminum & magnesium hydroxide-simethicone, hydrOXYzine HCl, OLANZapine **OR** OLANZapine (ZyPREXA) in sterile water IntraMUSCular, diphenhydrAMINE, acetaminophen, methocarbamol     FORMULATION:  This is a homeless, never , unemployed 26-year-old  with history of psychotic symptoms and substance use, who was brought by  squad to [facility] Emergency Department with ongoing psychotic symptoms.    The patient presents paranoid, delusional, and impaired. This is in the  setting of two recent hospitalizations under similar circumstances. It  is also in the setting of opioid and cannabis use. Given the severity  of the symptoms, he was admitted for further evaluation, stabilization,  and treatment. Principal Problem:    Psychosis, unspecified psychosis type ([facility]  Active Problems:    Opioid use disorder, severe, in controlled environment ([facility].)    Cannabis use disorder, moderate, in controlled environment [facility])    Urinary retention  Resolved Problems:    * No resolved hospital problems. *     PLAN:  1. Admitted to Inpatient Psychiatry for stabilization and treatment. 2.  On admission, started Invega for treatment of psychotic symptoms; he may benefit from transition to a long-acting injectable. Order q.15-minute checks for safety, programming, and p.r.n. medication for anxiety, agitation, insomnia and COWS protocol. 9/14/2022 - increased Invega to 6mg daily. Resume Suboxone at 12-3mg daily. Discontinued COWS.  9/15/2022 - Marla Honey Sustenna loading dose. Next loading dose due Monday. Reduce PO Invega to 3mg.     3. Internal Medicine consult for admission. #+UA   -urine culture negative   -could be colonization      #Urinary retention   -d/c cisneros   -flomax daily   -bladder scan   -if still with urinary retention, will consult urology      4. Further collateral information from family for diagnostic clarification and care coordination. 5.  Estimated length of stay, 5-8 days, for stabilization. He is voluntary. Total time with patient was 35 minutes and more than 50 % of that time was spent counseling the patient on their symptoms, treatment, and expected goals.      Jazmin Wagner MD

## 2022-09-15 NOTE — BH NOTE
Mukherjee catheter removed without difficulty. Urine clear yellow, patient tolerated well. Patient complained of back pain and a HA. Medicated with tramadol, robaxin per order.
Reported feeling less anxious and headache has improved. Attended to ADL's.
\"I'm not feeling so good. My anxiety is getting high. I just want to go home. \" Received Zyprexa 5 mg PO to aid in decreasing anxiety.
( ) Coping skills (new ways to manage stress,relaxation techniques, changing routine, distraction)                                                           ( ) Basic information about quitting (benefits of quitting, techniques in how to quit, available resources  ( ) Referral for counseling faxed to [facility]                                                                                                                   ( ) X Patient refused counseling  ( ) Patient has not smoked in the last 30 days    Metabolic Screening:    No results found for: LABA1C    No results found for: CHOL  No results found for: TRIG  No results found for: HDL  No components found for: LDLCAL  No results found for: LABVLDL      Body mass index is 19.01 kg/m².     BP Readings from Last 2 Encounters:   09/12/22 108/67   09/11/22 99/68           Pt admitted with followings belongings:  Dental Appliances: None  Vision - Corrective Lenses: None  Hearing Aid: None  Jewelry: None  Body Piercings Removed: N/A  Clothing: Pants, Shirt, Footwear, Sweater, Other (Comment) (1 pair of flannel pants, 1 black t-shirt, 1 black hoodie, 1 pair of short Ugg boots in the locker)  Other Valuables: Cigarettes, Other (Comment) (1 black Juul vape pen in the safe)    Sven Tang RN

## 2022-09-15 NOTE — PROGRESS NOTES
Group Therapy Note    Date: 9/15/2022  Start Time: 1300  End Time:  1400  Number of Participants: 11    Type of Group: Music Group    Notes:  Pt present for part of Music Group. While present, Pt sat quietly and listened.     Participation Level: Minimal    Participation Quality: Resistant      Speech:  hesitant      Affective Functioning: Blunted      Endings: None Reported    Modes of Intervention: Support, Socialization, Activity, and Media      Discipline Responsible: Chaplain Breezy Castrejon       09/15/22 1502   Encounter Summary   Encounter Overview/Reason  Behavioral Health   Last Encounter    (9/15 Music Group)   Complexity of Encounter Moderate   Begin Time 1310   End Time  1345   Total Time Calculated 35 min   Behavioral Health    Type  Spirituality Group

## 2022-09-15 NOTE — PLAN OF CARE
Problem: Coping  Goal: Pt/Family able to verbalize concerns and demonstrate effective coping strategies  Description: INTERVENTIONS:  1. Assist patient/family to identify coping skills, available support systems and cultural and spiritual values  2. Provide emotional support, including active listening and acknowledgement of concerns of patient and caregivers  3. Reduce environmental stimuli, as able  4. Instruct patient/family in relaxation techniques, as appropriate  5. Assess for spiritual pain/suffering and initiate Spiritual Care, Psychosocial Clinical Specialist consults as needed  9/15/2022 1134 by Caterina Cornejo LPN  Outcome: Progressing  Flowsheets (Taken 9/15/2022 1126)  Patient/family able to verbalize anxieties, fears, and concerns, and demonstrate effective coping:   Provide emotional support, including active listening and acknowledgement of concerns of patient and caregivers   Reduce environmental stimuli, as able     Problem: Confusion  Goal: Confusion, delirium, dementia, or psychosis is improved or at baseline  Description: INTERVENTIONS:  1. Assess for possible contributors to thought disturbance, including medications, impaired vision or hearing, underlying metabolic abnormalities, dehydration, psychiatric diagnoses, and notify attending LIP  2. [place] high risk fall precautions, as indicated  3. Provide frequent short contacts to provide reality reorientation, refocusing and direction  4. Decrease environmental stimuli, including noise as appropriate  5. Monitor and intervene to maintain adequate nutrition, hydration, elimination, sleep and activity  6. If unable to ensure safety without constant attention obtain sitter and review sitter guidelines with assigned personnel  7.  Initiate Psychosocial CNS and Spiritual Care consult, as indicated  9/15/2022 1134 by Caterina Cornejo LPN  Outcome: Progressing     Problem: Coping  Goal: Pt/Family able to verbalize concerns and demonstrate effective coping strategies  Description: INTERVENTIONS:  1. Assist patient/family to identify coping skills, available support systems and cultural and spiritual values  2. Provide emotional support, including active listening and acknowledgement of concerns of patient and caregivers  3. Reduce environmental stimuli, as able  4. Instruct patient/family in relaxation techniques, as appropriate  5. Assess for spiritual pain/suffering and initiate Spiritual Care, Psychosocial Clinical Specialist consults as needed  9/15/2022 1134 by Shaneka Duarte LPN  Outcome: Progressing  Flowsheets (Taken 9/15/2022 1126)  Patient/family able to verbalize anxieties, fears, and concerns, and demonstrate effective coping:   Provide emotional support, including active listening and acknowledgement of concerns of patient and caregivers   Reduce environmental stimuli, as able  9/15/2022 0613 by John Quiroga RN  Outcome: Not Progressing     Problem: Confusion  Goal: Confusion, delirium, dementia, or psychosis is improved or at baseline  Description: INTERVENTIONS:  1. Assess for possible contributors to thought disturbance, including medications, impaired vision or hearing, underlying metabolic abnormalities, dehydration, psychiatric diagnoses, and notify attending LIP  2. [place] high risk fall precautions, as indicated  3. Provide frequent short contacts to provide reality reorientation, refocusing and direction  4. Decrease environmental stimuli, including noise as appropriate  5. Monitor and intervene to maintain adequate nutrition, hydration, elimination, sleep and activity  6. If unable to ensure safety without constant attention obtain sitter and review sitter guidelines with assigned personnel  7.  Initiate Psychosocial CNS and Spiritual Care consult, as indicated  9/15/2022 1134 by Shaneka Duarte LPN  Outcome: Progressing  9/15/2022 0613 by John Quiroga RN  Outcome: Not Progressing

## 2022-09-15 NOTE — PROGRESS NOTES
Department of Psychiatry  Progress Note    Patient's chart was reviewed. Discussed with treatment team. Met with patient. SUBJECTIVE:      \"Not good, I'm going through withdrawal.\"    Insists sister is choi of [address] [place] [address]. There is no angie. Is 100% convinced he was adopted. His explaining is psychotic. No-longer believes his friend wants him dead. \"I was thinking in my head too much. \"    ROS:   Patient has new complaints: no  Sleeping adequately:  Yes   Appetite adequate: Yes  Engaged in programming: No:     OBJECTIVE:  VITALS:  BP (!) 122/92   Pulse (!) 109   Temp 97.3 °F (36.3 °C) (Temporal)   Resp 10   Ht 5' 8\" (1.727 m)   Wt 125 lb (56.7 kg)   SpO2 97%   BMI 19.01 kg/m²     Mental Status Examination:    Appearance: fair grooming and hygiene  Behavior/Attitude toward examiner:  fair eye contact  Speech: Normal rate, volume, amount  Mood:  \"ok\"  Affect:  blunted     Thought processes:  superficially organized  Thought Content: no SI, no HI, + delusional   Perceptions: no AVH, not RTIS  Attention: limited  Abstraction: limited  Cognition:  Alert and oriented to person, place, time, and situation, recall intact  Insight: Limited   Judgment: Limited    Medication:  Scheduled:   buprenorphine-naloxone  1.5 tablet SubLINGual Daily    paliperidone  6 mg Oral Daily    traZODone  50 mg Oral Nightly    nicotine  1 patch TransDERmal Daily    tamsulosin  0.4 mg Oral Daily        PRN:  ibuprofen, diphenhydrAMINE, magnesium hydroxide, nicotine polacrilex, aluminum & magnesium hydroxide-simethicone, hydrOXYzine HCl, OLANZapine **OR** OLANZapine (ZyPREXA) in sterile water IntraMUSCular, diphenhydrAMINE, acetaminophen, methocarbamol     FORMULATION:  This is a homeless, never , unemployed 59-year-old  with history of psychotic symptoms and substance use, who was brought by  squad to [facility] with ongoing psychotic symptoms. The patient presents paranoid, delusional, and impaired. This is in the  setting of two recent hospitalizations under similar circumstances. It  is also in the setting of opioid and cannabis use. Given the severity  of the symptoms, he was admitted for further evaluation, stabilization,  and treatment. Principal Problem:    Psychosis, unspecified psychosis type ([facility]  Active Problems:    Opioid use disorder, severe, in controlled environment ([place] [address].)    Cannabis use disorder, moderate, in controlled environment [facility])    Urinary retention  Resolved Problems:    * No resolved hospital problems. *     PLAN:  1. Admitted to Inpatient Psychiatry for stabilization and treatment. 2.  On admission, started Invega for treatment of psychotic symptoms; he may benefit from transition to a long-acting injectable. Order q.15-minute checks for safety, programming, and p.r.n. medication for anxiety, agitation, insomnia and COWS protocol. 9/14/2022 - increase Invega to 6mg daily. Resume Suboxone at 12-3mg daily. DC COWS.    3.  Internal Medicine consult for admission. #+UA   -urine culture negative   -could be colonization      #Urinary retention   -d/c cisneros   -flomax daily   -bladder scan   -if still with urinary retention, will consult urology      4. Further collateral information from family for diagnostic clarification and care coordination. 5.  Estimated length of stay, 5-8 days, for stabilization. He is voluntary. Total time with patient was 35 minutes and more than 50 % of that time was spent counseling the patient on their symptoms, treatment, and expected goals.      Cesar Duarte MD

## 2022-09-16 VITALS
SYSTOLIC BLOOD PRESSURE: 114 MMHG | HEIGHT: 68 IN | DIASTOLIC BLOOD PRESSURE: 73 MMHG | BODY MASS INDEX: 18.94 KG/M2 | RESPIRATION RATE: 16 BRPM | WEIGHT: 125 LBS | HEART RATE: 84 BPM | OXYGEN SATURATION: 97 % | TEMPERATURE: 98.2 F

## 2022-09-16 PROBLEM — R33.9 URINARY RETENTION: Status: RESOLVED | Noted: 2021-11-30 | Resolved: 2022-09-16

## 2022-09-16 PROCEDURE — 6370000000 HC RX 637 (ALT 250 FOR IP)

## 2022-09-16 PROCEDURE — 99239 HOSP IP/OBS DSCHRG MGMT >30: CPT | Performed by: PSYCHIATRY & NEUROLOGY

## 2022-09-16 PROCEDURE — 6360000002 HC RX W HCPCS: Performed by: PSYCHIATRY & NEUROLOGY

## 2022-09-16 PROCEDURE — 6370000000 HC RX 637 (ALT 250 FOR IP): Performed by: PSYCHIATRY & NEUROLOGY

## 2022-09-16 PROCEDURE — 5130000000 HC BRIDGE APPOINTMENT

## 2022-09-16 RX ORDER — DULOXETIN HYDROCHLORIDE 60 MG/1
60 CAPSULE, DELAYED RELEASE ORAL DAILY
Qty: 30 CAPSULE | Refills: 0 | Status: SHIPPED | OUTPATIENT
Start: 2022-09-16

## 2022-09-16 RX ORDER — DULOXETIN HYDROCHLORIDE 60 MG/1
60 CAPSULE, DELAYED RELEASE ORAL DAILY
Status: DISCONTINUED | OUTPATIENT
Start: 2022-09-16 | End: 2022-09-16 | Stop reason: HOSPADM

## 2022-09-16 RX ORDER — TAMSULOSIN HYDROCHLORIDE 0.4 MG/1
0.4 CAPSULE ORAL DAILY
Qty: 30 CAPSULE | Refills: 0 | Status: SHIPPED | OUTPATIENT
Start: 2022-09-17

## 2022-09-16 RX ORDER — PALIPERIDONE 3 MG/1
3 TABLET, EXTENDED RELEASE ORAL ONCE
Status: COMPLETED | OUTPATIENT
Start: 2022-09-16 | End: 2022-09-16

## 2022-09-16 RX ORDER — BUPRENORPHINE HYDROCHLORIDE AND NALOXONE HYDROCHLORIDE DIHYDRATE 8; 2 MG/1; MG/1
1.5 TABLET SUBLINGUAL DAILY
Qty: 8 TABLET | Refills: 0
Start: 2022-09-17 | End: 2022-09-22

## 2022-09-16 RX ORDER — PALIPERIDONE 3 MG/1
6 TABLET, EXTENDED RELEASE ORAL DAILY
Status: DISCONTINUED | OUTPATIENT
Start: 2022-09-17 | End: 2022-09-16 | Stop reason: HOSPADM

## 2022-09-16 RX ORDER — NICOTINE 21 MG/24HR
1 PATCH, TRANSDERMAL 24 HOURS TRANSDERMAL DAILY
Qty: 15 PATCH | Refills: 0 | Status: SHIPPED | OUTPATIENT
Start: 2022-09-17 | End: 2022-10-02

## 2022-09-16 RX ORDER — PALIPERIDONE 6 MG/1
6 TABLET, EXTENDED RELEASE ORAL DAILY
Qty: 30 TABLET | Refills: 0 | Status: SHIPPED | OUTPATIENT
Start: 2022-09-17

## 2022-09-16 RX ADMIN — NICOTINE POLACRILEX 2 MG: 2 GUM, CHEWING BUCCAL at 10:43

## 2022-09-16 RX ADMIN — ACETAMINOPHEN 650 MG: 325 TABLET ORAL at 09:33

## 2022-09-16 RX ADMIN — DULOXETINE HYDROCHLORIDE 60 MG: 60 CAPSULE, DELAYED RELEASE ORAL at 10:41

## 2022-09-16 RX ADMIN — TAMSULOSIN HYDROCHLORIDE 0.4 MG: 0.4 CAPSULE ORAL at 09:06

## 2022-09-16 RX ADMIN — NICOTINE POLACRILEX 2 MG: 2 GUM, CHEWING BUCCAL at 09:24

## 2022-09-16 RX ADMIN — PALIPERIDONE 3 MG: 3 TABLET, EXTENDED RELEASE ORAL at 10:41

## 2022-09-16 RX ADMIN — PALIPERIDONE 3 MG: 3 TABLET, EXTENDED RELEASE ORAL at 09:06

## 2022-09-16 RX ADMIN — HYDROXYZINE HYDROCHLORIDE 50 MG: 50 TABLET, FILM COATED ORAL at 13:42

## 2022-09-16 RX ADMIN — BUPRENORPHINE HYDROCHLORIDE AND NALOXONE HYDROCHLORIDE DIHYDRATE 1.5 TABLET: 8; 2 TABLET SUBLINGUAL at 09:06

## 2022-09-16 ASSESSMENT — PAIN SCALES - GENERAL
PAINLEVEL_OUTOF10: 0
PAINLEVEL_OUTOF10: 10
PAINLEVEL_OUTOF10: 0

## 2022-09-16 ASSESSMENT — PAIN SCALES - WONG BAKER: WONGBAKER_NUMERICALRESPONSE: 0

## 2022-09-16 NOTE — PROGRESS NOTES
[facility]  Discharge Note    Pt discharged with followings belongings:   Dental Appliances: None  Vision - Corrective Lenses: None  Hearing Aid: None  Jewelry: None  Body Piercings Removed: N/A  Clothing: Pants, Shirt, Footwear, Sweater, Other (Comment) (1 pair of flannel pants, 1 black t-shirt, 1 black hoodie, 1 pair of short Ugg boots in the locker)  Other Valuables: Cigarettes, Other (Comment) (1 black Juul vape pen in the safe)   Valuables sent home with patient. Patient education on aftercare instructions: yes  Information faxed to NA by NA  at 4:53 PM .Patient verbalize understanding of AVS:  yes. Status EXAM upon discharge:  Mental Status and Behavioral Exam  Normal: No  Level of Assistance: Independent/Self  Facial Expression: Flat  Affect: Blunt  Level of Consciousness: Alert  Frequency of Checks: 4 times per hour, close  Mood:Normal: No  Mood: Depressed, Anxious  Motor Activity:Normal: Yes  Motor Activity: Decreased  Eye Contact: Fair  Observed Behavior: Cooperative, Preoccupied  Sexual Misconduct History: Current - no  Preception: [place] to person, [place] to time, [place] to place  Attention:Normal: No  Attention: Distractible  Thought Processes: Circumstantial  Thought Content:Normal: No  Thought Content: Poverty of content  Depression Symptoms: Impaired concentration  Anxiety Symptoms: Generalized  [place] Symptoms: No problems reported or observed.   Hallucinations: None  Delusions: No  Delusions: Paranoid, Persecutory  Memory:Normal: No  Memory: Poor recent  Insight and Judgment: No  Insight and Judgment: Poor insight, Poor judgment    Tobacco Screening:  Practical Counseling, on admission, jasmin X, if applicable and completed (first 3 are required if patient doesn't refuse):            ( ) Recognizing danger situations (included triggers and roadblocks)                    ( ) Coping skills (new ways to manage stress,relaxation techniques, changing routine, distraction) ( ) Basic information about quitting (benefits of quitting, techniques in how to quit, available resources  ( ) Referral for counseling faxed to [facility]                                                                                                                   (X) Patient refused counseling  (X) Patient refused referral  ( ) Patient refused prescription upon discharge  ( ) Patient has not smoked in the last 30 days    Metabolic Screening:    No results found for: LABA1C    No results found for: CHOL  No results found for: TRIG  No results found for: HDL  No components found for: LDLCAL  No results found for: LABVLDL    Efrain Cervantes, RN

## 2022-09-16 NOTE — PROGRESS NOTES
Pt requested narcotic pain medication so he could urinate. Pt stated that it is the only way he can go. Pt educated to drink plenty of fluids and that staff would talk to medical when they arrive. Pt provided with tylenol. Will continue to monitor for safety and comfort.

## 2022-09-16 NOTE — DISCHARGE INSTRUCTIONS
Advanced Directives:  Patient does not have a surrogate decision maker appointed   Name (if yes): declined Phone Number:   Patient does not have a psychiatric and/ or medical advanced directive or power of . Patient was offered psychiatric and/ or medical advanced directive or power of  information/completion but declined to complete   Why not? declined    Discharge Planning is Complete. Discharge Date: 9/16/2022  Reason for Hospitalization: Suicidal Ideation  Discharge Diagnosis: Psychosis  Discharging to: Private Domicile    Your instructions: Your physician here was Shailesh Guerra MD. If you have any questions please call the unit at [phone] for the adult unit or [phone] for [facility]. Please note that we have a patient family advisory [place] that meets the second Wednesday of January and the second Wednesday of July at [facility] in [place] at [facility]. Department leadership would love for you to attend to give feedback on what we are doing well and areas in which we can improve our patient care. Please come if you are able and feel free to reach out to [facility] at [phone] with any questions. The crisis number for [employer] is 281-CARE. This crisis line is available 24 hours a day, seven days a week. Please follow up with your PCP regarding any pending labs. You are being referred to [facility] for case management, psychiatric medicine management, and individual therapy services.   They offer open access to treatment Monday through Friday 8:30 AM to 12:00 PM.  Name of Provider: [facility]  Provider specialty/license: mental health services  Date and time of appointment: Friday, September 23rd, 2022, at 9:00 AM  The type/s of services requested are: counseling/medication management  Agency name: [facility]  Address: [address] [facility], [address] [place] [address],[address]  Phone Number: [phone]  Special instructions (what to bring to appointment, etc.): Valid ID, insurance card if you have one, proof of residence (mail), proof of income (tax return, check stubs, award letter). If you are unable to attend the open enrollment date and time above please plan to attend open enrollment any Monday-Friday from 8:30am-12:00pm    Discharge Completed By: Melecio Lyman LSW  Fax to: Follow up provider name and number  [facility] [phone]    The following personal items were collected during your admission and were returned to you:    Belongings  Dental Appliances: None  Vision - Corrective Lenses: None  Hearing Aid: None  Clothing: Pants, Shirt, Footwear, Sweater, Other (Comment) (1 pair of flannel pants, 1 black t-shirt, 1 black hoodie, 1 pair of short Ugg boots in the locker)  Jewelry: None  Body Piercings Removed: N/A  Electronic Devices: None  Weapons (Notify Protective Services/Security): None  Other Valuables: Cigarettes, Other (Comment) (1 black Juul vape pen in the safe)  Home Medications: None  Valuables Given To: Other (Comment) (Safe)  Provide Name(s) of Who Valuable(s) Were Given To: In the safe  Responsible person(s) in the waiting room: N/A  Patient approves for provider to speak to responsible person post operatively: No    By signing below, I understand and acknowledge receipt of the instructions indicated above.

## 2022-09-16 NOTE — PLAN OF CARE
Problem: Coping  Goal: Pt/Family able to verbalize concerns and demonstrate effective coping strategies  Description: INTERVENTIONS:  1. Assist patient/family to identify coping skills, available support systems and cultural and spiritual values  2. Provide emotional support, including active listening and acknowledgement of concerns of patient and caregivers  3. Reduce environmental stimuli, as able  4. Instruct patient/family in relaxation techniques, as appropriate  5. Assess for spiritual pain/suffering and initiate Spiritual Care, Psychosocial Clinical Specialist consults as needed  Outcome: Not Progressing     Problem: Confusion  Goal: Confusion, delirium, dementia, or psychosis is improved or at baseline  Description: INTERVENTIONS:  1. Assess for possible contributors to thought disturbance, including medications, impaired vision or hearing, underlying metabolic abnormalities, dehydration, psychiatric diagnoses, and notify attending LIP  2. [place] high risk fall precautions, as indicated  3. Provide frequent short contacts to provide reality reorientation, refocusing and direction  4. Decrease environmental stimuli, including noise as appropriate  5. Monitor and intervene to maintain adequate nutrition, hydration, elimination, sleep and activity  6. If unable to ensure safety without constant attention obtain sitter and review sitter guidelines with assigned personnel  7. Initiate Psychosocial CNS and Spiritual Care consult, as indicated  Outcome: Not Progressing   Pt has only been out of his room very briefly for a snack and otherwise has isolated self so far this shift. Pt is alert, oriented, but flat, doesn't want to be bothered. Denies psychotic sx. He does appear preoccupied at times. He seems to have persecutory and paranoid delusions. Pt is polite but does not seem to want to be bothered. Refused HS scheduled Trazodone 50 mg because he says it does not help him.   He too Benadryl 50 mg for sleep at 2143 and at the same time was given  mg po for a pain of five in each let. At this time meds have seemed to be effective. Pt is sleeping with a FLACC 0.

## 2022-09-16 NOTE — PLAN OF CARE
Problem: Coping  Goal: Pt/Family able to verbalize concerns and demonstrate effective coping strategies  Description: INTERVENTIONS:  1. Assist patient/family to identify coping skills, available support systems and cultural and spiritual values  2. Provide emotional support, including active listening and acknowledgement of concerns of patient and caregivers  3. Reduce environmental stimuli, as able  4. Instruct patient/family in relaxation techniques, as appropriate  5. Assess for spiritual pain/suffering and initiate Spiritual Care, Psychosocial Clinical Specialist consults as needed  9/16/2022 1150 by Verba Eisenmenger Cathie FergusonROLY Dunbar  Outcome: Completed  9/16/2022 0959 by Heidi Cashmenandra Salinas [facility]), RN  Outcome: Not Progressing  9/16/2022 0135 by Crystal Bennett RN  Outcome: Not Progressing     Problem: Confusion  Goal: Confusion, delirium, dementia, or psychosis is improved or at baseline  Description: INTERVENTIONS:  1. Assess for possible contributors to thought disturbance, including medications, impaired vision or hearing, underlying metabolic abnormalities, dehydration, psychiatric diagnoses, and notify attending LIP  2. [place] high risk fall precautions, as indicated  3. Provide frequent short contacts to provide reality reorientation, refocusing and direction  4. Decrease environmental stimuli, including noise as appropriate  5. Monitor and intervene to maintain adequate nutrition, hydration, elimination, sleep and activity  6. If unable to ensure safety without constant attention obtain sitter and review sitter guidelines with assigned personnel  7.  Initiate Psychosocial CNS and Spiritual Care consult, as indicated  9/16/2022 1150 by Verba Eisenmenger Cathie NgoROLY Dunbar  Outcome: Completed  9/16/2022 0135 by Crystal Bennett RN  Outcome: Not Progressing

## 2022-09-16 NOTE — PLAN OF CARE
Problem: Coping  Goal: Pt/Family able to verbalize concerns and demonstrate effective coping strategies  Description: INTERVENTIONS:  1. Assist patient/family to identify coping skills, available support systems and cultural and spiritual values  2. Provide emotional support, including active listening and acknowledgement of concerns of patient and caregivers  3. Reduce environmental stimuli, as able  4. Instruct patient/family in relaxation techniques, as appropriate  5. Assess for spiritual pain/suffering and initiate Spiritual Care, Psychosocial Clinical Specialist consults as needed  9/16/2022 0959 by Nadia Jeong RN  Outcome: Not Progressing  9/16/2022 0135 by Maxime Guzmán RN  Outcome: Not Progressing     Problem: Confusion  Goal: Confusion, delirium, dementia, or psychosis is improved or at baseline  Description: INTERVENTIONS:  1. Assess for possible contributors to thought disturbance, including medications, impaired vision or hearing, underlying metabolic abnormalities, dehydration, psychiatric diagnoses, and notify attending LIP  2. [place] high risk fall precautions, as indicated  3. Provide frequent short contacts to provide reality reorientation, refocusing and direction  4. Decrease environmental stimuli, including noise as appropriate  5. Monitor and intervene to maintain adequate nutrition, hydration, elimination, sleep and activity  6. If unable to ensure safety without constant attention obtain sitter and review sitter guidelines with assigned personnel  7.  Initiate Psychosocial CNS and Spiritual Care consult, as indicated  9/16/2022 0135 by Maxime Guzmán RN  Outcome: Not Progressing

## 2022-10-08 LAB
EKG ATRIAL RATE: 158 BPM
EKG DIAGNOSIS: NORMAL
EKG P-R INTERVAL: 104 MS
EKG Q-T INTERVAL: 314 MS
EKG QRS DURATION: 98 MS
EKG QTC CALCULATION (BAZETT): 509 MS
EKG R AXIS: 96 DEGREES
EKG T AXIS: 78 DEGREES
EKG VENTRICULAR RATE: 158 BPM

## 2022-10-20 NOTE — DISCHARGE SUMMARY
Department of Psychiatry    Discharge Summary      Mi Cordon  [id number]    Admission date:   9/11/2022    Discharge:   Date: 9/16/2022   Location: home    Inpatient Provider: William Keller MD  Unit: [facility]    Diagnosis on Admission:  Psychosis, unspecified    Diagnosis on Discharge:  Psychosis, unspecified    Active Hospital Problems    Diagnosis Date Noted    Psychosis, unspecified psychosis type ([address] [F29] 09/12/2022     Priority: Medium    Opioid use disorder, severe, in controlled environment [facility]) [F11.20] 09/12/2022     Priority: Medium    Cannabis use disorder, moderate, in controlled environment [facility]) [F12.20] 09/12/2022     Priority: Medium    Urinary retention [R33.9] 11/30/2021       Reason for Admission:    From my admission note:  IDENTIFICATION:  This is a homeless, never , unemployed  78-year-old with a chart history of substance use and psychotic  symptoms, who was brought to [facility] Emergency Department by squad  after banging on the door of a [place] while stating that people  were chasing him with guns. SOURCES OF INFORMATION:  The patient. Focused record review. CHIEF COMPLAINT:  \"I thought my friend was trying to kill me. \"     HISTORY OF PRESENT ILLNESS:  According to Care Everywhere records, the  patient has been admitted twice over the last couple of months, first to  [place] and then [facility]. Evidently, he was admitted to [place] previously after chasing his father with a hammer in what appears to be a [place] delusion. At the [facility], he expressed a number of delusional ideas including that he is friends with  and his  sister is the Ajith. At first, UC thought it was a substance-induced psychotic event, but as time went on, they became more concerned for a primary psychotic disorder such as  schizophrenia.   Their note does not describe the patient to be  hallucinating or disorganized. His discharge diagnosis  from  was psychosis, NOS. He was discharged on Zyprexa 20 mg at bedtime, Suboxone 12/3 daily, BuSpar 10 mg three times a day, Cymbalta 60 mg daily, and trazodone 100 mg at bedtime as needed. According to the note, he was brought to [facility] ED yesterday after  someone called because he was found banging on the door of a [facility], while claiming people were chasing him with guns. When police  arrived, he expressed paranoid ideas and seemed to be  hallucinating. Evidently, when EMS arrived and attempted to evaluate  him, he tried to eat their ECG leads. Given this, he was  transported  to the ED for evaluation. There, he endorsed fear as people  were trying to kill him with guns. When I met with him, he told me that a friend (with whom he was staying)  was trying to kill him. He knew because he heard gun clicking noises. He ran out of his friend's house and up the street to [place]  and says the called 911. He made a number of delusional statements during my interview including that he believes he was adopted from [facility] because he looks different than his parents. When asked whether or not he has kids, he said [place] one or two,\" but declined to elaborate further. He endorsed feeling safe here and willing to approach staff with concerns. Although sedated, his thought process was goal directable. He was not responding to internal stimuli, nor did he endorse auditory or visual  hallucinations. PSYCHIATRIC REVIEW OF SYSTEMS:  No leonardo or depression. STRESSORS:  Homeless, financial, social.     PAST PSYCHIATRIC HISTORY:  The patient tells me no previous  hospitalizations, though the chart note indicates he was admitted to  both [place] and  the last couple of months. His discharge diagnosis  from  was psychosis, NOS, rule out schizophrenia. [facility]   He did not follow up with eye  contact. He described his mood as \"okay\" and had a blunted affect. He  had mild psychomotor retardation. He spoke softly. He was not pressured. He was oriented to the date,  day, place and the context of this evaluation. His memory was intact. His use of language, speech and educational attainment suggested an average  level of intellectual functioning. His thought processes were organized and goal-directed. He did not  describe or endorse homicidal or suicidal thinking. He did endorse  paranoid delusions. He did not endorse auditory or visual  hallucinations and was not responding to internal stimuli. He reported  feeling safe here and willing to approach staff with concerns. His ability for abstract thought was impaired based on his  interpretation of simple proverbs. Insight and judgment were impaired. PHYSICAL EXAMINATION on admission:  VITALS:  Temperature 98.1, pulse 61, respiratory rate 20, blood pressure  108/67. GAIT:  Normal.     LABORATORY DATA on admission:  Show a CMP with a chloride at 94, glucose of 118, otherwise within normal limits. TSH, within normal limits. Ethanol  level, not detectable. Urine drug screen, positive for cannabis. Acetaminophen and salicylate levels below threshold. CBC, within normal  limits. UA shows possible infection. No culture suggested  contamination. FORMULATION on admission: This is a homeless, never , unemployed 40-year-old with history of psychotic symptoms and substance use, who was brought by squad to [facility] Emergency Department with ongoing psychotic symptoms. The patient presents paranoid, delusional, and impaired. This is in the  setting of two recent hospitalizations under similar circumstances. It  is also in the setting of opioid and cannabis use. Given the severity  of the symptoms, he was admitted for further evaluation, stabilization,  and treatment. DIAGNOSES on admission:  1.   Psychosis, unspecified. 2.  Opioid use disorder, severe, in controlled environment. 3.  Cannabis use disorder, moderate, in a controlled environment. 4.  History of urinary retention. Hospital Course:   1. Admitted to Inpatient Psychiatry for stabilization and treatment. 2.  On admission, started Invega for treatment of psychotic symptoms; he may benefit from transition to a long-acting injectable. Ordered q.15-minute checks for safety, programming, and p.r.n. medication for anxiety, agitation, insomnia and COWS protocol. 9/14/2022 - \"Not good, I'm going through withdrawal.\" Insists sister is choi of [address] [place] [address]. There is no angie. Is 100% convinced he was adopted. His reasoning is psychotic. No-longer believes his friend wants him dead. \"I was thinking in my head too much. \"increased Invega to 6mg daily. Resumed Suboxone at 12-3mg daily. Discontinued COWS.    9/15/2022 - Better today. No longer experiencing symptoms of opoid withdrawal. Still very delusional. Says he doesn't have a family. Tolerating Invega well. Discussed an [place] trial at length, including the risks, benefits, and potential side effects. In agreement. Ordered Invega Sustenna loading dose but he later declined. He elected to stay on Invega 6mg daily. Mr. Patricia Cedillo stabilized with treatment including sobriety, medication, programming, and the structured milieu. His psychotic symptoms improved - he became less paranoid and delusional, and more reality based. He attending some groups and demonstrated safe behavior throughout the admission. He did not express thoughts of wanting to harm himself or anyone else. He tolerated Invega well and without side effects. He committed to continuing treatment after discharge. 3.  Internal Medicine consult for admission.     #+UA   -urine culture negative   -could be colonization      #Urinary retention   -d/c cisneros   -flomax daily   -bladder scan   -if still with urinary retention, will consult urology      4.  voluntary. Complications: none;  Kierra Deleon did not require emergency psychiatric intervention during this admission such as restraint or emergency medication. Vital signs in last 24 hours:  Vitals:    09/16/22 0916   BP: 114/73   Pulse: 84   Resp: 16   Temp: 98.2 °F (36.8 °C)   SpO2: 97%       Mental Status Examination on Discharge:    Appearance: fair grooming and hygiene  Behavior/Attitude toward examiner:  fair eye contact  Speech: Normal rate, volume, amount  Mood:  \"ok\"  Affect:  blunted     Thought processes:  superficially organized  Thought Content: no SI, no HI, less delusional   Perceptions: no AVH, not RTIS  Attention: limited  Abstraction: limited  Cognition:  Alert and oriented to person, place, time, and situation, recall intact  Insight: Limited   Judgment: Limited    Discharge on regular diet, continue activity as tolerated. Condition on Discharge:  Kierra Deleon was in stable condition. Kierra Deleon did not have suicidal or homicidal thoughts, and was future oriented. Kierra Deleon did not represent an imminent risk to self or others. However, given static risk factors, Kierra Deleon remains at perpetually elevated risk going forward. Medication List        START taking these medications      nicotine 21 MG/24HR  Commonly known as: [address] [facility] 1 patch onto the skin daily for 15 days     paliperidone 6 MG extended release tablet  Commonly known as: INVEGA  Take 1 tablet by mouth daily            CHANGE how you take these medications      DULoxetine 60 MG extended release capsule  Commonly known as: CYMBALTA  Take 1 capsule by mouth daily  What changed:   how much to take  how to take this  when to take this  additional instructions  Another medication with the same name was removed. Continue taking this medication, and follow the directions you see here.             CONTINUE taking these medications      tamsulosin 0.4 MG capsule  Commonly known as: FLOMAX  Take 1 capsule by mouth daily            STOP taking these medications      baclofen 20 MG tablet  Commonly known as: LIORESAL     busPIRone 30 MG tablet  Commonly known as: BUSPAR     calcium carbonate 500 MG chewable tablet  Commonly known as: TUMS     OLANZapine 20 MG tablet  Commonly known as: ZYPREXA     oxybutynin 5 MG tablet  Commonly known as: DITROPAN     traZODone 50 MG tablet  Commonly known as: DESYREL            ASK your doctor about these medications      buprenorphine-naloxone 8-2 MG Subl SL tablet  Commonly known as: SUBOXONE  Place 1.5 tablets under the tongue daily for 5 days. Ask about: Should I take this medication? Where to Get Your Medications        You can get these medications from any pharmacy    Bring a paper prescription for each of these medications  DULoxetine 60 MG extended release capsule  nicotine 21 MG/24HR  paliperidone 6 MG extended release tablet  tamsulosin 0.4 MG capsule       Information about where to get these medications is not yet available    Ask your nurse or doctor about these medications  buprenorphine-naloxone 8-2 MG Subl SL tablet         Follow-up Plan: The following was given to the patient at discharge: The crisis number for [facility] is [phone]-[phone]. This crisis line is available 24 hours a day, seven days a week. Please follow up with your PCP regarding any pending labs. You are being referred to [facility] for case management, psychiatric medicine management, and individual therapy services.   They offer open access to treatment Monday through Friday 8:30 AM to 12:00 PM.  Name of Provider: [facility]  Provider specialty/license: mental health services  Date and time of appointment: Friday, September 23rd, 2022, at 9:00 AM  The type/s of services requested are: counseling/medication management  Agency name: [address] [place] [address] Health  Address: [address] [place] [address], [address] [place] [address],[address]  Phone Number: [phone]  Special instructions (what to bring to appointment, etc.): Valid ID, insurance card if you have one, proof of residence (mail), proof of income (tax return, check stubs, award letter). If you are unable to attend the open enrollment date and time above please plan to attend open enrollment any Monday-Friday from 8:30am-12:00pm    More than 30 minutes were spent with the patient in completing this  evaluation and more than 50% of the time was spent completing this  evaluation, providing counseling, and planning treatment with the  patient.

## 2023-04-15 ENCOUNTER — HOSPITAL ENCOUNTER (EMERGENCY)
Age: 28
Discharge: HOME | End: 2023-04-15
Attending: EMERGENCY MEDICINE
Payer: MEDICAID

## 2023-04-15 VITALS
HEART RATE: 71 BPM | RESPIRATION RATE: 12 BRPM | HEIGHT: 68 IN | WEIGHT: 150 LBS | OXYGEN SATURATION: 99 % | BODY MASS INDEX: 22.73 KG/M2 | DIASTOLIC BLOOD PRESSURE: 92 MMHG | TEMPERATURE: 98.1 F | SYSTOLIC BLOOD PRESSURE: 140 MMHG

## 2023-04-15 DIAGNOSIS — R00.2 PALPITATIONS: Primary | ICD-10-CM

## 2023-04-15 PROCEDURE — 99283 EMERGENCY DEPT VISIT LOW MDM: CPT

## 2023-04-15 PROCEDURE — 93005 ELECTROCARDIOGRAM TRACING: CPT | Performed by: EMERGENCY MEDICINE

## 2023-04-15 ASSESSMENT — PAIN - FUNCTIONAL ASSESSMENT: PAIN_FUNCTIONAL_ASSESSMENT: NONE - DENIES PAIN

## 2023-04-15 NOTE — ED PROVIDER NOTES
Asymptomatic at present. No new EKG changes. I do not feel that this patient needs a prescription for metoprolol. Certainly not indicated with his heart rate in the 60s now and no arrhythmia. Do not feel this is needed as it was just given in the first place for sinus tachycardia even at low rates. Patient does certainly have some underlying psychiatric disease and anxiety which could be playing into his current symptoms. Regardless, do feel that he can be discharged home to follow-up as an outpatient    Medications and Route:   Medications - No data to display    History From: Patient         Chronic Conditions: Noted in HPI    CONSULTS: (Who and What was discussed)  None            I, Lucia Williamson M.D., am the primary clinician of record. MDM      CONSULTS     None    Critical Care:   None    REASSESSMENT          PROCEDURE     Unless otherwise noted below, none     Procedures      FINAL IMPRESSION      1. Palpitations            DISPOSITION/PLAN   DISPOSITION Decision To Discharge 04/15/2023 04:51:52 PM        PATIENT REFERRED TO:  Onel Vazquez MD  [address]  [phone]    Schedule an appointment as soon as possible for a visit         DISCHARGE MEDICATIONS:  Discharge Medication List as of 4/15/2023  4:56 PM        Controlled Substances Monitoring:     RX Monitoring 9/3/2015   Periodic Controlled Substance Monitoring No signs of potential drug abuse or diversion identified.        (Please note that portions of this note were completed with a voice recognition program.  Efforts were made to edit the dictations but occasionally words are mis-transcribed.)    Lucia Williamson MD (electronically signed)  Attending Emergency Physician            Napoleon Hahn MD  04/16/23 [id number]

## 2023-04-16 LAB
EKG ATRIAL RATE: 60 BPM
EKG DIAGNOSIS: NORMAL
EKG P AXIS: 64 DEGREES
EKG P-R INTERVAL: 174 MS
EKG Q-T INTERVAL: 416 MS
EKG QRS DURATION: 106 MS
EKG QTC CALCULATION (BAZETT): 416 MS
EKG R AXIS: 63 DEGREES
EKG T AXIS: 53 DEGREES
EKG VENTRICULAR RATE: 60 BPM

## 2023-04-16 PROCEDURE — 93010 ELECTROCARDIOGRAM REPORT: CPT | Performed by: INTERNAL MEDICINE

## 2023-06-05 ENCOUNTER — OFFICE VISIT (OUTPATIENT)
Dept: INTERNAL MEDICINE CLINIC | Age: 28
End: 2023-06-05
Payer: MEDICAID

## 2023-06-05 VITALS
WEIGHT: 158 LBS | HEIGHT: 68 IN | DIASTOLIC BLOOD PRESSURE: 85 MMHG | BODY MASS INDEX: 23.95 KG/M2 | SYSTOLIC BLOOD PRESSURE: 120 MMHG

## 2023-06-05 DIAGNOSIS — F20.9 SCHIZOPHRENIA, UNSPECIFIED TYPE (HCC): Primary | ICD-10-CM

## 2023-06-05 PROCEDURE — 99203 OFFICE O/P NEW LOW 30 MIN: CPT | Performed by: STUDENT IN AN ORGANIZED HEALTH CARE EDUCATION/TRAINING PROGRAM

## 2023-06-05 RX ORDER — RISPERIDONE 0.5 MG/1
0.5 TABLET ORAL 2 TIMES DAILY
Qty: 60 TABLET | Refills: 0 | Status: SHIPPED | OUTPATIENT
Start: 2023-06-05 | End: 2023-07-05

## 2023-06-05 RX ORDER — OXYBUTYNIN CHLORIDE 5 MG/1
5 TABLET ORAL DAILY
Qty: 30 TABLET | Refills: 0 | Status: SHIPPED | OUTPATIENT
Start: 2023-06-05 | End: 2023-06-09 | Stop reason: SDUPTHER

## 2023-06-05 ASSESSMENT — ENCOUNTER SYMPTOMS
ABDOMINAL PAIN: 0
SHORTNESS OF BREATH: 0
PHOTOPHOBIA: 0
APNEA: 0
VOMITING: 0
COUGH: 0
NAUSEA: 0
CHOKING: 0
WHEEZING: 0
STRIDOR: 0
VOICE CHANGE: 0
CONSTIPATION: 0
ABDOMINAL DISTENTION: 0
TROUBLE SWALLOWING: 0
DIARRHEA: 0
CHEST TIGHTNESS: 0

## 2023-06-05 NOTE — PROGRESS NOTES
VLDL  No results found for: CHOLHDLRATIO  No results found for: LABA1C  No results found for: EAG        Physical Exam  Vitals and nursing note reviewed. Constitutional:       General: He is not in acute distress. Appearance: Normal appearance. He is well-developed. He is not diaphoretic. HENT:      Head: Normocephalic and atraumatic. Eyes:      General: No scleral icterus. Conjunctiva/sclera: Conjunctivae normal.      Pupils: Pupils are equal, round, and reactive to light. Cardiovascular:      Rate and Rhythm: Normal rate and regular rhythm. Pulses: Normal pulses. Heart sounds: Normal heart sounds. No murmur heard. No friction rub. No gallop. Pulmonary:      Effort: Pulmonary effort is normal. No respiratory distress. Breath sounds: Normal breath sounds. No wheezing or rales. Chest:      Chest wall: No tenderness. Abdominal:      General: Bowel sounds are normal. There is no distension. Palpations: Abdomen is soft. Tenderness: There is no abdominal tenderness. Musculoskeletal:         General: No swelling, tenderness or deformity. Normal range of motion. Cervical back: Normal range of motion and neck supple. Skin:     General: Skin is warm and dry. Neurological:      Mental Status: He is alert and oriented to person, place, and time. Cranial Nerves: No cranial nerve deficit. Sensory: No sensory deficit. Psychiatric:         Behavior: Behavior normal.      Comments: Flat affect            Please note that this chart was generated using dragon dictation software. Although every effort was made to ensure the accuracy of this automated transcription, some errors in transcription may have occurred.        --Carmen Paul MD

## 2023-06-05 NOTE — ASSESSMENT & PLAN NOTE
He was on multiple medication before in the past.  Multiple emergency room along with psych admission.   - He was advised to follow-up with [facility] to establish with a psychiatrist   - Was previously on multiple medications including Cymbalta, BuSpar, amitriptyline, Atarax, olanzapine, trazodone, risperidone.   Also got injection of Invega.   -Risperidone refill sent today

## 2023-06-09 ENCOUNTER — TELEPHONE (OUTPATIENT)
Dept: INTERNAL MEDICINE CLINIC | Age: 28
End: 2023-06-09

## 2023-06-09 RX ORDER — OXYBUTYNIN CHLORIDE 5 MG/1
5 TABLET ORAL 3 TIMES DAILY
Qty: 90 TABLET | Refills: 0 | Status: SHIPPED | OUTPATIENT
Start: 2023-06-09 | End: 2023-06-29 | Stop reason: SDUPTHER

## 2023-06-09 RX ORDER — OXYBUTYNIN CHLORIDE 5 MG/1
TABLET ORAL
Qty: 30 TABLET | Refills: 0 | OUTPATIENT
Start: 2023-06-09

## 2023-06-09 NOTE — TELEPHONE ENCOUNTER
Previous rx shows that he was on it TID and was recently changed to BID. Will update Rx to Oxybutynin 5 mg 1 tablet TID. Please advise patient to follow up with urology.

## 2023-06-09 NOTE — TELEPHONE ENCOUNTER
Mr. Lakeisha Yip is calling about his oxybutynin. He states it's supposed to be 3 tabs a day not one.  Please advise

## 2023-06-29 RX ORDER — OXYBUTYNIN CHLORIDE 5 MG/1
5 TABLET ORAL 3 TIMES DAILY
Qty: 90 TABLET | Refills: 0 | Status: SHIPPED | OUTPATIENT
Start: 2023-06-29 | End: 2023-07-29

## 2023-06-29 RX ORDER — RISPERIDONE 0.5 MG/1
0.5 TABLET ORAL 2 TIMES DAILY
Qty: 60 TABLET | Refills: 0 | Status: SHIPPED | OUTPATIENT
Start: 2023-06-29 | End: 2023-07-29

## 2023-07-02 RX ORDER — OXYBUTYNIN CHLORIDE 5 MG/1
TABLET ORAL
Qty: 90 TABLET | Refills: 0 | OUTPATIENT
Start: 2023-07-02

## 2023-07-05 RX ORDER — RISPERIDONE 0.5 MG/1
0.5 TABLET ORAL 2 TIMES DAILY
Qty: 60 TABLET | Refills: 0 | Status: SHIPPED | OUTPATIENT
Start: 2023-07-05 | End: 2023-08-04

## 2023-07-05 RX ORDER — OXYBUTYNIN CHLORIDE 5 MG/1
5 TABLET ORAL 3 TIMES DAILY
Qty: 90 TABLET | Refills: 0 | Status: SHIPPED | OUTPATIENT
Start: 2023-07-05 | End: 2023-08-04

## 2023-07-05 NOTE — TELEPHONE ENCOUNTER
Medication:   Requested Prescriptions     Pending Prescriptions Disp Refills    risperiDONE (RISPERDAL) 0.5 MG tablet 60 tablet 0     Sig: Take 1 tablet by mouth 2 times daily    oxybutynin (DITROPAN) 5 MG tablet 90 tablet 0     Sig: Take 1 tablet by mouth 3 times daily        Last Filled:      Patient Phone Number: [phone] (home) [phone] (work)    Last appt: 6/5/2023   Next appt: Visit date not found    Last OARRS:   RX Monitoring 9/3/2015   Periodic Controlled Substance Monitoring No signs of potential drug abuse or diversion identified.

## 2023-07-28 RX ORDER — RISPERIDONE 0.5 MG/1
TABLET ORAL
Qty: 60 TABLET | Refills: 0 | OUTPATIENT
Start: 2023-07-28

## 2025-01-30 ENCOUNTER — HOSPITAL ENCOUNTER (EMERGENCY)
Age: 30
Discharge: HOME | End: 2025-01-30
Attending: EMERGENCY MEDICINE
Payer: MEDICAID

## 2025-01-30 VITALS
DIASTOLIC BLOOD PRESSURE: 103 MMHG | HEART RATE: 117 BPM | TEMPERATURE: 99.8 F | BODY MASS INDEX: 32.13 KG/M2 | HEIGHT: 68 IN | SYSTOLIC BLOOD PRESSURE: 134 MMHG | RESPIRATION RATE: 18 BRPM | OXYGEN SATURATION: 95 % | WEIGHT: 212 LBS

## 2025-01-30 DIAGNOSIS — R11.0 NAUSEA: Primary | ICD-10-CM

## 2025-01-30 PROCEDURE — 99282 EMERGENCY DEPT VISIT SF MDM: CPT

## 2025-01-30 ASSESSMENT — ENCOUNTER SYMPTOMS
NAUSEA: 1
SHORTNESS OF BREATH: 0
ABDOMINAL PAIN: 0
VOMITING: 1

## 2025-01-31 NOTE — ED PROVIDER NOTES
ED Attending Attestation Note     Date of evaluation: 1/30/2025    This patient was seen by the resident.  Unfortunately the patient chose to leave the emergency department/eloped prior to my evaluation.  I did not see or evaluate this patient.     Bandar Park MD  01/30/25 0757

## 2025-01-31 NOTE — ED PROVIDER NOTES
THE [facility]  EMERGENCY DEPARTMENT ENCOUNTER          [facility] RESIDENT NOTE     Date of evaluation: 1/30/2025    Chief Complaint     Nausea (Pt. Presents to ED with c/o nausea for the past two hours with one episode of vomiting. )    History of Present Illness     Bayron Guerra is a 29 y.o. male with medical history of schizophrenia with paranoia, HTN, urinary problem who presents for evaluation of sudden-onset nausea and vomiting beginning at 1800 this evening along with central abdominal pain. No blood in his emesis. He had eaten a sandwich before the emesis began which is something he often eats. No new foods. No recent travel. No recent sick contacts. No recent diarrhea, constipation, headache, SOB, chest pain. He has had some recent medication changes recently, namely a change in his BP medicine and a change in his cholesterol medicine (but he is not sure which ones).     Pt smokes a vape throughout the day. No marijuana, cocaine, heroin, or other illicit substance use. He denies drinking alcohol.     Pt presented with his mother who assisted with history.     ASSESSMENT / PLAN  (MEDICAL DECISION MAKING)     INITIAL VITALS: BP: (!) 134/103, Temp: 99.8 °F (37.7 °C), Pulse: (!) 117, Respirations: 18, SpO2: 95 %     Bayron Guerra is a 29 y.o. male with history of schizophrenia who presents for evaluation of sudden-onset nausea, emesis, and middle abdominal pain. Unfortunately he elected to leave without further workup such as physical examination, blood work, so unable to provide a thorough differential and plan. He and his mother were advised that this may represent a serious threat to his life and safety but they would like to leave now. Strict return precautions given; he and his mother were advised that they can return at any time for full evaluation.     The patient left before attending was able to evaluate the pt.     Is this patient to be included in the SEP-1 core measure? No Exclusion criteria -

## 2025-05-15 NOTE — PLAN OF CARE
Problem: Coping  Goal: Pt/Family able to verbalize concerns and demonstrate effective coping strategies  Description: INTERVENTIONS:  1. Assist patient/family to identify coping skills, available support systems and cultural and spiritual values  2. Provide emotional support, including active listening and acknowledgement of concerns of patient and caregivers  3. Reduce environmental stimuli, as able  4. Instruct patient/family in relaxation techniques, as appropriate  5. Assess for spiritual pain/suffering and initiate Spiritual Care, Psychosocial Clinical Specialist consults as needed  Outcome: Not Progressing     Problem: Confusion  Goal: Confusion, delirium, dementia, or psychosis is improved or at baseline  Description: INTERVENTIONS:  1. Assess for possible contributors to thought disturbance, including medications, impaired vision or hearing, underlying metabolic abnormalities, dehydration, psychiatric diagnoses, and notify attending LIP  2. [place] high risk fall precautions, as indicated  3. Provide frequent short contacts to provide reality reorientation, refocusing and direction  4. Decrease environmental stimuli, including noise as appropriate  5. Monitor and intervene to maintain adequate nutrition, hydration, elimination, sleep and activity  6. If unable to ensure safety without constant attention obtain sitter and review sitter guidelines with assigned personnel  7. Initiate Psychosocial CNS and Spiritual Care consult, as indicated  Outcome: Not Progressing   Pt has seemed very needy tonight and seems to focus on meds. Pt had tylenol 650 mg po for c/o upper legs aching at a 5/10. At 2101. Pt later said it had decreased and was effective. Later he said his legs were again at a 5/10. Pt asking for IBU. By the time we could obtain an order pt was asleep. Pt also asking for something for sleep. Spoke to YANG Fields NP and was given an order for Benadryl 50 mg po qhs prn.   Pt had Benadryl at 2307 with good results. Pt has seemed preoccupied, anxious, sw suspicious. Denies psychotic sx . Denies Si. show